# Patient Record
Sex: FEMALE | Race: WHITE | NOT HISPANIC OR LATINO | Employment: UNEMPLOYED | URBAN - METROPOLITAN AREA
[De-identification: names, ages, dates, MRNs, and addresses within clinical notes are randomized per-mention and may not be internally consistent; named-entity substitution may affect disease eponyms.]

---

## 2020-01-17 PROBLEM — R11.2 INTRACTABLE NAUSEA AND VOMITING: Status: ACTIVE | Noted: 2020-01-17

## 2020-01-17 PROBLEM — R10.9 ABDOMINAL PAIN: Status: ACTIVE | Noted: 2020-01-17

## 2020-01-17 PROBLEM — I10 HYPERTENSION: Status: ACTIVE | Noted: 2020-01-17

## 2020-01-18 ENCOUNTER — HOSPITAL ENCOUNTER (INPATIENT)
Facility: HOSPITAL | Age: 65
LOS: 8 days | Discharge: HOME | DRG: 435 | End: 2020-01-26
Attending: INTERNAL MEDICINE | Admitting: INTERNAL MEDICINE
Payer: MEDICARE

## 2020-01-18 DIAGNOSIS — K86.89 PANCREATIC MASS: Primary | ICD-10-CM

## 2020-01-18 PROBLEM — R45.89 DEPRESSED MOOD: Status: ACTIVE | Noted: 2020-01-18

## 2020-01-18 PROBLEM — R74.01 TRANSAMINITIS: Status: ACTIVE | Noted: 2020-01-18

## 2020-01-18 PROBLEM — J18.1 CONSOLIDATION OF RIGHT LOWER LOBE OF LUNG: Status: ACTIVE | Noted: 2020-01-18

## 2020-01-18 PROBLEM — D72.829 LEUKOCYTOSIS: Status: ACTIVE | Noted: 2020-01-18

## 2020-01-18 PROBLEM — R93.5 ABNORMAL CT OF THE ABDOMEN: Status: ACTIVE | Noted: 2020-01-18

## 2020-01-18 PROBLEM — D64.9 NORMOCYTIC ANEMIA: Status: ACTIVE | Noted: 2020-01-18

## 2020-01-18 PROBLEM — J90 PLEURAL EFFUSION: Status: ACTIVE | Noted: 2020-01-18

## 2020-01-18 PROBLEM — E87.6 HYPOKALEMIA: Status: ACTIVE | Noted: 2020-01-18

## 2020-01-18 PROCEDURE — 25000003 PHARM REV CODE 250: Performed by: STUDENT IN AN ORGANIZED HEALTH CARE EDUCATION/TRAINING PROGRAM

## 2020-01-18 PROCEDURE — 63600175 PHARM REV CODE 636 W HCPCS: Performed by: STUDENT IN AN ORGANIZED HEALTH CARE EDUCATION/TRAINING PROGRAM

## 2020-01-18 PROCEDURE — 63600175 PHARM REV CODE 636 W HCPCS: Performed by: HOSPITALIST

## 2020-01-18 PROCEDURE — 92960 CARDIOVERSION ELECTRIC EXT: CPT

## 2020-01-18 PROCEDURE — 99223 1ST HOSP IP/OBS HIGH 75: CPT | Mod: ,,, | Performed by: HOSPITALIST

## 2020-01-18 PROCEDURE — 20600001 HC STEP DOWN PRIVATE ROOM

## 2020-01-18 PROCEDURE — 99223 PR INITIAL HOSPITAL CARE,LEVL III: ICD-10-PCS | Mod: ,,, | Performed by: HOSPITALIST

## 2020-01-18 RX ORDER — SODIUM CHLORIDE 0.9 % (FLUSH) 0.9 %
10 SYRINGE (ML) INJECTION
Status: DISCONTINUED | OUTPATIENT
Start: 2020-01-18 | End: 2020-01-26 | Stop reason: HOSPADM

## 2020-01-18 RX ORDER — PANTOPRAZOLE SODIUM 40 MG/1
40 TABLET, DELAYED RELEASE ORAL DAILY
Status: DISCONTINUED | OUTPATIENT
Start: 2020-01-19 | End: 2020-01-19

## 2020-01-18 RX ORDER — CALCIUM CARBONATE 200(500)MG
500 TABLET,CHEWABLE ORAL ONCE
Status: COMPLETED | OUTPATIENT
Start: 2020-01-18 | End: 2020-01-18

## 2020-01-18 RX ORDER — POLYETHYLENE GLYCOL 3350 17 G/17G
17 POWDER, FOR SOLUTION ORAL DAILY
Status: DISCONTINUED | OUTPATIENT
Start: 2020-01-19 | End: 2020-01-26 | Stop reason: HOSPADM

## 2020-01-18 RX ORDER — ZOLPIDEM TARTRATE 5 MG/1
5 TABLET ORAL NIGHTLY PRN
Status: DISCONTINUED | OUTPATIENT
Start: 2020-01-18 | End: 2020-01-26 | Stop reason: HOSPADM

## 2020-01-18 RX ORDER — PROMETHAZINE HYDROCHLORIDE 12.5 MG/1
25 TABLET ORAL EVERY 6 HOURS PRN
Status: DISCONTINUED | OUTPATIENT
Start: 2020-01-18 | End: 2020-01-19

## 2020-01-18 RX ORDER — ONDANSETRON 8 MG/1
8 TABLET, ORALLY DISINTEGRATING ORAL EVERY 8 HOURS PRN
Status: DISCONTINUED | OUTPATIENT
Start: 2020-01-18 | End: 2020-01-26 | Stop reason: HOSPADM

## 2020-01-18 RX ORDER — HYDROCODONE BITARTRATE AND ACETAMINOPHEN 5; 325 MG/1; MG/1
1 TABLET ORAL EVERY 4 HOURS PRN
Status: DISCONTINUED | OUTPATIENT
Start: 2020-01-18 | End: 2020-01-26 | Stop reason: HOSPADM

## 2020-01-18 RX ORDER — MORPHINE SULFATE 2 MG/ML
4 INJECTION, SOLUTION INTRAMUSCULAR; INTRAVENOUS EVERY 4 HOURS PRN
Status: DISCONTINUED | OUTPATIENT
Start: 2020-01-18 | End: 2020-01-23

## 2020-01-18 RX ORDER — HYDRALAZINE HYDROCHLORIDE 20 MG/ML
10 INJECTION INTRAMUSCULAR; INTRAVENOUS ONCE
Status: COMPLETED | OUTPATIENT
Start: 2020-01-18 | End: 2020-01-18

## 2020-01-18 RX ORDER — LISINOPRIL AND HYDROCHLOROTHIAZIDE 12.5; 2 MG/1; MG/1
1 TABLET ORAL DAILY
Status: DISCONTINUED | OUTPATIENT
Start: 2020-01-19 | End: 2020-01-19

## 2020-01-18 RX ORDER — ACETAMINOPHEN 325 MG/1
650 TABLET ORAL EVERY 4 HOURS PRN
Status: DISCONTINUED | OUTPATIENT
Start: 2020-01-18 | End: 2020-01-26 | Stop reason: HOSPADM

## 2020-01-18 RX ADMIN — CALCIUM CARBONATE (ANTACID) CHEW TAB 500 MG 500 MG: 500 CHEW TAB at 06:01

## 2020-01-18 RX ADMIN — MORPHINE SULFATE 4 MG: 2 INJECTION, SOLUTION INTRAMUSCULAR; INTRAVENOUS at 09:01

## 2020-01-18 RX ADMIN — HYDRALAZINE HYDROCHLORIDE 10 MG: 20 INJECTION INTRAMUSCULAR; INTRAVENOUS at 06:01

## 2020-01-18 RX ADMIN — ONDANSETRON 8 MG: 8 TABLET, ORALLY DISINTEGRATING ORAL at 06:01

## 2020-01-18 RX ADMIN — PIPERACILLIN SODIUM,TAZOBACTAM SODIUM 4.5 G: 4; .5 INJECTION, POWDER, FOR SOLUTION INTRAVENOUS at 09:01

## 2020-01-18 RX ADMIN — PROMETHAZINE HYDROCHLORIDE 12.5 MG: 25 INJECTION INTRAMUSCULAR; INTRAVENOUS at 10:01

## 2020-01-18 RX ADMIN — MORPHINE SULFATE 4 MG: 2 INJECTION, SOLUTION INTRAMUSCULAR; INTRAVENOUS at 05:01

## 2020-01-18 RX ADMIN — VANCOMYCIN HYDROCHLORIDE 1500 MG: 1.5 INJECTION, POWDER, LYOPHILIZED, FOR SOLUTION INTRAVENOUS at 10:01

## 2020-01-18 NOTE — NURSING
Pt received by ambulance into room 1055. C/o abd and back pain. Will continue to monitor. Education provided on poc.

## 2020-01-18 NOTE — PLAN OF CARE
"[facility] admissions ONLY: Please call extension [phone] upon patient arrival to floor for Hospital Medicine admit team assignment and for additional admit orders for the patient. Do not page the attending, staff physician or Advanced Practice Provider with the patient on arrival (may not be in-house at the time of arrival). Call back or wait to leave beeper number when prompted.     Outside Transfer Acceptance Note        Patients name/MRN:  Radha Alcazar, MRN: [id number]     Referring Physician or Mid-Level provider giving report:  Courtney Díaz MD  [phone]     Referral Facility:  [facility], inpatient, med-surg tele, original admit date of 1/17/20     Date/Time of Acceptance: 1/18/20 at 1:30pm     Accepting Physician for admission to hospital: IRENE Delgado MD ()     [facility]     Consulting Physicians from [facility] involved in case:   None    Reason for transfer request:   Ms. Alcazar is a 65yo lady recently admitted at [facility] on 1/17/20.  They noted on HPI that she, presented to the ED with complaints of nausea and vomiting, inability to tolerate PO and generalized weakness. Of not she was recently [place] ([facility]) for persistent vomiting and R shoulder pain, discharged 1 day prior to presentation. Pt stated she had decreased appetite since thanksgiving due to the feeling of "food getting stuck in chest" as well as epigastric pain that radiates to her R shoulder. Symptoms have progressively worsened with decreased ability to tolerate PO. Pt reported a 40lbs unintentional weight loss since Nov 2019. She endorses minimal intake for the last 2 weeks, constipation, acid reflux and N/V.      Of note, the patient states she had a CT scan performed at [facility] and was told she had gallbladder stones and a mass on pancreas and was referred to cancer center. After discharge yesterday, patient continued to vomit " "at home and was unable to eat/drink or keep down her medications. She denied any recent illness, sick contacts, night sweats, f/c, urinary changes, hemoptysis or hematuria. She denied melena or BRBPR.    CT abdomen on admission (1/17) revealed multiple abnormalities: Distended stomach, 1st, 2nd and 3rd portions of the duodenum suggestive of an obstructive process.  Ascites in the abdomen and pelvis and abnormal low-density foci along the capsule of the right lobe of the liver laterally and inferiorly suspicious for carcinomatosis. Abnormal diminished attenuation in the region of the body and tail of the pancreas with fluid adjacent to these areas.  Findings could represent pancreatic neoplasm and/or pancreatitis. Bilateral pleural effusions with airspace consolidation in the lower lobes bilaterally as well as in the right middle lobe.    She was begun on empiric Vancomycin and Zosyn for the airspace consolidations.     To Do List upon arrival:    Consult Heme-Onc and AES  Will need EUS and biopsy     VS: /82 (Patient Position: Lying)   Pulse 84   Temp 96.7 °F (35.9 °C) (Oral)   Resp 20   Ht 5' 11" (1.803 m)   Wt 80 kg (176 lb 5.9 oz)   SpO2 97%   Breastfeeding? No   BMI 24.60 kg/m²     Past Medical History:   Diagnosis Date    Hypertension      Current Facility-Administered Medications:     acetaminophen tablet 650 mg, 650 mg, Oral, Q4H PRN, Courtney Díaz MD    dextrose 50% injection 12.5 g, 12.5 g, Intravenous, PRN, Courtney Díaz MD    dextrose 50% injection 25 g, 25 g, Intravenous, PRN, Courtney Díaz MD    enoxaparin injection 40 mg, 40 mg, Subcutaneous, Daily     glucose chewable tablet 16 g, 16 g, Oral, PRN, Courtney Díaz MD    glucose chewable tablet 24 g, 24 g, Oral, PRN, Courtney Díaz MD    hydroCHLOROthiazide tablet 12.5 mg, 12.5 mg, Oral, Daily     HYDROcodone-acetaminophen  mg per tablet 1 tablet, 1 tablet, Oral, Q6H PRN     " HYDROcodone-acetaminophen 5-325 mg per tablet 1 tablet, 1 tablet, Oral, Q6H PRN     lisinopril tablet 20 mg, 20 mg, Oral, QHS, Courtney Díaz MD, 20 mg at 01/17/20 2326    ondansetron injection 4 mg, 4 mg, Intravenous, Q8H PRN, Courtney Díaz MD    piperacillin-tazobactam 4.5 g in dextrose 5 % 100 mL IVPB   4.5 g, Intravenous, Q8H     potassium, sodium phosphates 280-160-250 mg packet 1 packet, 1 packet, Oral, Q4H While awake    promethazine (PHENERGAN) 6.25 mg in dextrose 5 % 50 mL IVPB, 6.25 mg, Intravenous, Q6H     sodium chloride 0.9% flush 10 mL, 10 mL, Intravenous, PRN, Courtney Díaz MD    Pharmacy to dose Vancomycin consult, , , Once **AND** vancomycin - pharmacy to dose     vancomycin 1.5 g in dextrose 5 % 250 mL IVPB (ready to mix), 1,500 mg, Intravenous, Q12H        LABS:   Recent Results (from the past 24 hour(s))   CBC W/ AUTO DIFFERENTIAL    Collection Time: 01/17/20  5:00 PM   Result Value Ref Range    WBC 19.67 (H) 3.90 - 12.70 K/uL    RBC 4.42 4.00 - 5.40 M/uL    Hemoglobin 13.7 12.0 - 16.0 g/dL    Hematocrit 41.0 37.0 - 48.5 %    Mean Corpuscular Volume 93 82 - 98 fL    Mean Corpuscular Hemoglobin 31.0 27.0 - 31.0 pg    Mean Corpuscular Hemoglobin Conc 33.4 32.0 - 36.0 g/dL    RDW 13.2 11.5 - 14.5 %    Platelets 379 (H) 150 - 350 K/uL    MPV 10.5 9.2 - 12.9 fL    Immature Granulocytes 1.0 (H) 0.0 - 0.5 %    Gran # (ANC) 16.2 (H) 1.8 - 7.7 K/uL    Immature Grans (Abs) 0.20 (H) 0.00 - 0.04 K/uL    Lymph # 1.7 1.0 - 4.8 K/uL    Mono # 1.5 (H) 0.3 - 1.0 K/uL    Eos # 0.0 0.0 - 0.5 K/uL    Baso # 0.05 0.00 - 0.20 K/uL    nRBC 0 0 /100 WBC    Gran% 82.3 (H) 38.0 - 73.0 %    Lymph% 8.5 (L) 18.0 - 48.0 %    Mono% 7.7 4.0 - 15.0 %    Eosinophil% 0.2 0.0 - 8.0 %    Basophil% 0.3 0.0 - 1.9 %    Differential Method Automated    Comp. Metabolic Panel    Collection Time: 01/17/20  5:00 PM   Result Value Ref Range    Sodium 136 136 - 145 mmol/L    Potassium 3.6 3.5 - 5.1 mmol/L    Chloride  94 (L) 95 - 110 mmol/L    CO2 25 23 - 29 mmol/L    Glucose 96 70 - 110 mg/dL    BUN, Bld 9 8 - 23 mg/dL    Creatinine 0.6 0.5 - 1.4 mg/dL    Calcium 8.8 8.7 - 10.5 mg/dL    Total Protein 7.2 6.0 - 8.4 g/dL    Albumin 2.6 (L) 3.5 - 5.2 g/dL    Total Bilirubin 1.1 (H) 0.1 - 1.0 mg/dL    Alkaline Phosphatase 186 (H) 55 - 135 U/L    AST 74 (H) 10 - 40 U/L    ALT 98 (H) 10 - 44 U/L    Anion Gap 17 (H) 8 - 16 mmol/L    eGFR if African American >60.0 >60 mL/min/1.73 m^2    eGFR if non African American >60.0 >60 mL/min/1.73 m^2   Lipase    Collection Time: 01/17/20  5:00 PM   Result Value Ref Range    Lipase 7 4 - 60 U/L   Blood Culture #1 **CANNOT BE ORDERED STAT**    Collection Time: 01/17/20  6:50 PM   Result Value Ref Range    Blood Culture, Routine No Growth to date    Blood Culture #2 **CANNOT BE ORDERED STAT**    Collection Time: 01/17/20  6:55 PM   Result Value Ref Range    Blood Culture, Routine No Growth to date    Respiratory Infection Panel, Nasopharyngeal    Collection Time: 01/17/20  9:31 PM   Result Value Ref Range    Respiratory Infection Panel Source NP Swab Not Detected    Adenovirus Not Detected Not Detected    Coronavirus 229E Not Detected Not Detected    Coronavirus HKU1 Not Detected Not Detected    Coronavirus NL63 Not Detected Not Detected    Coronavirus OC43 Not Detected Not Detected    Human Metapneumovirus Not Detected Not Detected    Human Rhinovirus/Enterovirus Not Detected Not Detected    Influenza A (subtypes H1, H1-2009,H3) Not Detected Not Detected    Influenza B Not Detected Not Detected    Parainfluenza Virus 1 Not Detected Not Detected    Parainfluenza Virus 2 Not Detected Not Detected    Parainfluenza Virus 3 Not Detected Not Detected    Parainfluenza Virus 4 Not Detected Not Detected    Respiratory Syncytial Virus Not Detected Not Detected    Bordetella Parapertussis (BI3960) Not Detected Not Detected    Bordetella pertussis (ptxP) Not Detected Not Detected    Chlamydia pneumoniae Not  Detected Not Detected    Mycoplasma pneumoniae Not Detected Not Detected   Urinalysis, Reflex to Urine Culture Urine, Clean Catch    Collection Time: 01/17/20 11:30 PM   Result Value Ref Range    Specimen UA Urine, Clean Catch     Color, UA Yellow Yellow, Straw, Jacquelin    Appearance, UA Clear Clear    pH, UA 6.0 5.0 - 8.0    Specific Gravity, UA >1.030 (A) 1.005 - 1.030    Protein, UA Negative Negative    Glucose, UA Negative Negative    Ketones, UA 1+ (A) Negative    Bilirubin (UA) Negative Negative    Occult Blood UA Negative Negative    Nitrite, UA Negative Negative    Leukocytes, UA Negative Negative   CBC with Automated Differential    Collection Time: 01/18/20  5:25 AM   Result Value Ref Range    WBC 15.34 (H) 3.90 - 12.70 K/uL    RBC 3.76 (L) 4.00 - 5.40 M/uL    Hemoglobin 11.6 (L) 12.0 - 16.0 g/dL    Hematocrit 34.3 (L) 37.0 - 48.5 %    Mean Corpuscular Volume 91 82 - 98 fL    Mean Corpuscular Hemoglobin 30.9 27.0 - 31.0 pg    Mean Corpuscular Hemoglobin Conc 33.8 32.0 - 36.0 g/dL    RDW 13.2 11.5 - 14.5 %    Platelets 390 (H) 150 - 350 K/uL    MPV 10.1 9.2 - 12.9 fL    Immature Granulocytes 1.0 (H) 0.0 - 0.5 %    Gran # (ANC) 12.5 (H) 1.8 - 7.7 K/uL    Immature Grans (Abs) 0.15 (H) 0.00 - 0.04 K/uL    Lymph # 1.2 1.0 - 4.8 K/uL    Mono # 1.4 (H) 0.3 - 1.0 K/uL    Eos # 0.1 0.0 - 0.5 K/uL    Baso # 0.04 0.00 - 0.20 K/uL    nRBC 0 0 /100 WBC    Gran% 81.1 (H) 38.0 - 73.0 %    Lymph% 7.8 (L) 18.0 - 48.0 %    Mono% 9.3 4.0 - 15.0 %    Eosinophil% 0.5 0.0 - 8.0 %    Basophil% 0.3 0.0 - 1.9 %    Differential Method Automated    PT/INR    Collection Time: 01/18/20  5:25 AM   Result Value Ref Range    Prothrombin Time 16.8 (H) 9.0 - 12.5 sec    INR 1.5 (H) 0.8 - 1.2   AFP tumor marker    Collection Time: 01/18/20  5:25 AM   Result Value Ref Range    AFP 5.8 0.0 - 8.4 ng/mL   Comprehensive Metabolic Panel (CMP)    Collection Time: 01/18/20  5:26 AM   Result Value Ref Range    Sodium 134 (L) 136 - 145 mmol/L     Potassium 3.1 (L) 3.5 - 5.1 mmol/L    Chloride 96 95 - 110 mmol/L    CO2 26 23 - 29 mmol/L    Glucose 106 70 - 110 mg/dL    BUN, Bld 11 8 - 23 mg/dL    Creatinine 0.6 0.5 - 1.4 mg/dL    Calcium 7.9 (L) 8.7 - 10.5 mg/dL    Total Protein 5.9 (L) 6.0 - 8.4 g/dL    Albumin 2.2 (L) 3.5 - 5.2 g/dL    Total Bilirubin 0.9 0.1 - 1.0 mg/dL    Alkaline Phosphatase 170 (H) 55 - 135 U/L    AST 75 (H) 10 - 40 U/L    ALT 90 (H) 10 - 44 U/L    Anion Gap 12 8 - 16 mmol/L    eGFR if African American >60.0 >60 mL/min/1.73 m^2    eGFR if non African American >60.0 >60 mL/min/1.73 m^2   Magnesium    Collection Time: 01/18/20  5:26 AM   Result Value Ref Range    Magnesium 1.7 1.6 - 2.6 mg/dL   Phosphorus    Collection Time: 01/18/20  5:26 AM   Result Value Ref Range    Phosphorus 2.3 (L) 2.7 - 4.5 mg/dL       Imaging:   - CXR with bilateral pleural effusions, R > L and airspace consolidation is noted in the R mid and lower lung zones.    - Abdominal U/S with distended fluid-filled stomach in the LUQ, a R pleural effusion, heterogeneous echogenicity in portions of the inferior aspect of the liver    - CT with distended stomach, 1st, 2nd and 3rd portions of the duodenum suggestive of an obstructive process, bilateral pleural effusions with airspace consolidation in the lower lobes bilaterally as well as in the R middle lobe. Pancreatic lesion suspicious for pancreatic neoplasm and/or pancreatitis.    IRENE Delgado MD  Department of Hospital Medicine  [facility]/   [phone]

## 2020-01-19 LAB
ALBUMIN SERPL BCP-MCNC: 2.3 G/DL (ref 3.5–5.2)
ALP SERPL-CCNC: 164 U/L (ref 55–135)
ALT SERPL W/O P-5'-P-CCNC: 90 U/L (ref 10–44)
ANION GAP SERPL CALC-SCNC: 11 MMOL/L (ref 8–16)
ANION GAP SERPL CALC-SCNC: 13 MMOL/L (ref 8–16)
ANION GAP SERPL CALC-SCNC: 14 MMOL/L (ref 8–16)
AST SERPL-CCNC: 79 U/L (ref 10–40)
BASOPHILS # BLD AUTO: 0.06 K/UL (ref 0–0.2)
BASOPHILS NFR BLD: 0.4 % (ref 0–1.9)
BILIRUB SERPL-MCNC: 0.7 MG/DL (ref 0.1–1)
BUN SERPL-MCNC: 12 MG/DL (ref 8–23)
BUN SERPL-MCNC: 12 MG/DL (ref 8–23)
BUN SERPL-MCNC: 13 MG/DL (ref 8–23)
CALCIUM SERPL-MCNC: 8.3 MG/DL (ref 8.7–10.5)
CALCIUM SERPL-MCNC: 8.4 MG/DL (ref 8.7–10.5)
CALCIUM SERPL-MCNC: 8.6 MG/DL (ref 8.7–10.5)
CHLORIDE SERPL-SCNC: 95 MMOL/L (ref 95–110)
CHLORIDE SERPL-SCNC: 95 MMOL/L (ref 95–110)
CHLORIDE SERPL-SCNC: 98 MMOL/L (ref 95–110)
CO2 SERPL-SCNC: 26 MMOL/L (ref 23–29)
CO2 SERPL-SCNC: 26 MMOL/L (ref 23–29)
CO2 SERPL-SCNC: 31 MMOL/L (ref 23–29)
CREAT SERPL-MCNC: 1 MG/DL (ref 0.5–1.4)
CREAT SERPL-MCNC: 1.1 MG/DL (ref 0.5–1.4)
CREAT SERPL-MCNC: 1.2 MG/DL (ref 0.5–1.4)
DIFFERENTIAL METHOD: ABNORMAL
EOSINOPHIL # BLD AUTO: 0.1 K/UL (ref 0–0.5)
EOSINOPHIL NFR BLD: 0.4 % (ref 0–8)
ERYTHROCYTE [DISTWIDTH] IN BLOOD BY AUTOMATED COUNT: 13.4 % (ref 11.5–14.5)
EST. GFR  (AFRICAN AMERICAN): 55.2 ML/MIN/1.73 M^2
EST. GFR  (AFRICAN AMERICAN): >60 ML/MIN/1.73 M^2
EST. GFR  (AFRICAN AMERICAN): >60 ML/MIN/1.73 M^2
EST. GFR  (NON AFRICAN AMERICAN): 47.9 ML/MIN/1.73 M^2
EST. GFR  (NON AFRICAN AMERICAN): 53.2 ML/MIN/1.73 M^2
EST. GFR  (NON AFRICAN AMERICAN): 59.7 ML/MIN/1.73 M^2
FERRITIN SERPL-MCNC: 398 NG/ML (ref 20–300)
GLUCOSE SERPL-MCNC: 108 MG/DL (ref 70–110)
GLUCOSE SERPL-MCNC: 108 MG/DL (ref 70–110)
GLUCOSE SERPL-MCNC: 89 MG/DL (ref 70–110)
HCT VFR BLD AUTO: 37.4 % (ref 37–48.5)
HGB BLD-MCNC: 12.1 G/DL (ref 12–16)
IMM GRANULOCYTES # BLD AUTO: 0.13 K/UL (ref 0–0.04)
IMM GRANULOCYTES NFR BLD AUTO: 0.9 % (ref 0–0.5)
IRON SERPL-MCNC: 43 UG/DL (ref 30–160)
LYMPHOCYTES # BLD AUTO: 1.5 K/UL (ref 1–4.8)
LYMPHOCYTES NFR BLD: 10.5 % (ref 18–48)
MAGNESIUM SERPL-MCNC: 1.8 MG/DL (ref 1.6–2.6)
MCH RBC QN AUTO: 30.3 PG (ref 27–31)
MCHC RBC AUTO-ENTMCNC: 32.4 G/DL (ref 32–36)
MCV RBC AUTO: 94 FL (ref 82–98)
MONOCYTES # BLD AUTO: 1.5 K/UL (ref 0.3–1)
MONOCYTES NFR BLD: 10.8 % (ref 4–15)
NEUTROPHILS # BLD AUTO: 10.8 K/UL (ref 1.8–7.7)
NEUTROPHILS NFR BLD: 77 % (ref 38–73)
NRBC BLD-RTO: 0 /100 WBC
PHOSPHATE SERPL-MCNC: 2.9 MG/DL (ref 2.7–4.5)
PLATELET # BLD AUTO: 434 K/UL (ref 150–350)
PMV BLD AUTO: 10 FL (ref 9.2–12.9)
POTASSIUM SERPL-SCNC: 2.9 MMOL/L (ref 3.5–5.1)
POTASSIUM SERPL-SCNC: 3.2 MMOL/L (ref 3.5–5.1)
POTASSIUM SERPL-SCNC: 4 MMOL/L (ref 3.5–5.1)
PROT SERPL-MCNC: 5.9 G/DL (ref 6–8.4)
RBC # BLD AUTO: 4 M/UL (ref 4–5.4)
SATURATED IRON: 20 % (ref 20–50)
SODIUM SERPL-SCNC: 135 MMOL/L (ref 136–145)
SODIUM SERPL-SCNC: 137 MMOL/L (ref 136–145)
SODIUM SERPL-SCNC: 137 MMOL/L (ref 136–145)
TOTAL IRON BINDING CAPACITY: 219 UG/DL (ref 250–450)
TRANSFERRIN SERPL-MCNC: 148 MG/DL (ref 200–375)
VANCOMYCIN TROUGH SERPL-MCNC: 25.8 UG/ML (ref 10–22)
WBC # BLD AUTO: 14.03 K/UL (ref 3.9–12.7)

## 2020-01-19 PROCEDURE — 83735 ASSAY OF MAGNESIUM: CPT

## 2020-01-19 PROCEDURE — 82728 ASSAY OF FERRITIN: CPT

## 2020-01-19 PROCEDURE — 99233 SBSQ HOSP IP/OBS HIGH 50: CPT | Mod: ,,, | Performed by: INTERNAL MEDICINE

## 2020-01-19 PROCEDURE — 80048 BASIC METABOLIC PNL TOTAL CA: CPT | Mod: 91

## 2020-01-19 PROCEDURE — 99233 SBSQ HOSP IP/OBS HIGH 50: CPT | Mod: ,,, | Performed by: HOSPITALIST

## 2020-01-19 PROCEDURE — 99232 PR SUBSEQUENT HOSPITAL CARE,LEVL II: ICD-10-PCS | Mod: ,,, | Performed by: INTERNAL MEDICINE

## 2020-01-19 PROCEDURE — 85025 COMPLETE CBC W/AUTO DIFF WBC: CPT

## 2020-01-19 PROCEDURE — 20600001 HC STEP DOWN PRIVATE ROOM

## 2020-01-19 PROCEDURE — 63600175 PHARM REV CODE 636 W HCPCS: Performed by: STUDENT IN AN ORGANIZED HEALTH CARE EDUCATION/TRAINING PROGRAM

## 2020-01-19 PROCEDURE — 63600175 PHARM REV CODE 636 W HCPCS: Performed by: HOSPITALIST

## 2020-01-19 PROCEDURE — 94761 N-INVAS EAR/PLS OXIMETRY MLT: CPT

## 2020-01-19 PROCEDURE — 80053 COMPREHEN METABOLIC PANEL: CPT

## 2020-01-19 PROCEDURE — 99233 PR SUBSEQUENT HOSPITAL CARE,LEVL III: ICD-10-PCS | Mod: ,,, | Performed by: INTERNAL MEDICINE

## 2020-01-19 PROCEDURE — 83540 ASSAY OF IRON: CPT

## 2020-01-19 PROCEDURE — 99233 PR SUBSEQUENT HOSPITAL CARE,LEVL III: ICD-10-PCS | Mod: ,,, | Performed by: HOSPITALIST

## 2020-01-19 PROCEDURE — C9113 INJ PANTOPRAZOLE SODIUM, VIA: HCPCS | Performed by: STUDENT IN AN ORGANIZED HEALTH CARE EDUCATION/TRAINING PROGRAM

## 2020-01-19 PROCEDURE — 80202 ASSAY OF VANCOMYCIN: CPT

## 2020-01-19 PROCEDURE — 25000003 PHARM REV CODE 250: Performed by: STUDENT IN AN ORGANIZED HEALTH CARE EDUCATION/TRAINING PROGRAM

## 2020-01-19 PROCEDURE — 84100 ASSAY OF PHOSPHORUS: CPT

## 2020-01-19 PROCEDURE — 80048 BASIC METABOLIC PNL TOTAL CA: CPT

## 2020-01-19 PROCEDURE — 99232 SBSQ HOSP IP/OBS MODERATE 35: CPT | Mod: ,,, | Performed by: INTERNAL MEDICINE

## 2020-01-19 PROCEDURE — 36415 COLL VENOUS BLD VENIPUNCTURE: CPT

## 2020-01-19 RX ORDER — PANTOPRAZOLE SODIUM 40 MG/10ML
40 INJECTION, POWDER, LYOPHILIZED, FOR SOLUTION INTRAVENOUS EVERY 24 HOURS
Status: DISCONTINUED | OUTPATIENT
Start: 2020-01-19 | End: 2020-01-22

## 2020-01-19 RX ORDER — POTASSIUM CHLORIDE 750 MG/1
30 CAPSULE, EXTENDED RELEASE ORAL
Status: DISCONTINUED | OUTPATIENT
Start: 2020-01-19 | End: 2020-01-19

## 2020-01-19 RX ORDER — POTASSIUM CHLORIDE 7.45 MG/ML
10 INJECTION INTRAVENOUS
Status: DISPENSED | OUTPATIENT
Start: 2020-01-19 | End: 2020-01-19

## 2020-01-19 RX ORDER — LISINOPRIL 20 MG/1
20 TABLET ORAL DAILY
Status: DISCONTINUED | OUTPATIENT
Start: 2020-01-19 | End: 2020-01-22

## 2020-01-19 RX ORDER — POTASSIUM CHLORIDE 7.45 MG/ML
10 INJECTION INTRAVENOUS
Status: DISCONTINUED | OUTPATIENT
Start: 2020-01-19 | End: 2020-01-19

## 2020-01-19 RX ORDER — POTASSIUM CHLORIDE 7.45 MG/ML
10 INJECTION INTRAVENOUS ONCE
Status: COMPLETED | OUTPATIENT
Start: 2020-01-19 | End: 2020-01-19

## 2020-01-19 RX ORDER — BISACODYL 10 MG
10 SUPPOSITORY, RECTAL RECTAL ONCE
Status: COMPLETED | OUTPATIENT
Start: 2020-01-19 | End: 2020-01-19

## 2020-01-19 RX ORDER — BISACODYL 10 MG
10 SUPPOSITORY, RECTAL RECTAL DAILY PRN
Status: DISCONTINUED | OUTPATIENT
Start: 2020-01-20 | End: 2020-01-26 | Stop reason: HOSPADM

## 2020-01-19 RX ORDER — SODIUM CHLORIDE, SODIUM LACTATE, POTASSIUM CHLORIDE, CALCIUM CHLORIDE 600; 310; 30; 20 MG/100ML; MG/100ML; MG/100ML; MG/100ML
INJECTION, SOLUTION INTRAVENOUS CONTINUOUS
Status: DISCONTINUED | OUTPATIENT
Start: 2020-01-19 | End: 2020-01-24

## 2020-01-19 RX ORDER — HYDROCHLOROTHIAZIDE 12.5 MG/1
12.5 TABLET ORAL DAILY
Status: DISCONTINUED | OUTPATIENT
Start: 2020-01-19 | End: 2020-01-22

## 2020-01-19 RX ADMIN — POTASSIUM CHLORIDE 10 MEQ: 7.46 INJECTION, SOLUTION INTRAVENOUS at 04:01

## 2020-01-19 RX ADMIN — BISACODYL 10 MG: 10 SUPPOSITORY RECTAL at 12:01

## 2020-01-19 RX ADMIN — MORPHINE SULFATE 4 MG: 2 INJECTION, SOLUTION INTRAMUSCULAR; INTRAVENOUS at 12:01

## 2020-01-19 RX ADMIN — PIPERACILLIN SODIUM,TAZOBACTAM SODIUM 4.5 G: 4; .5 INJECTION, POWDER, FOR SOLUTION INTRAVENOUS at 09:01

## 2020-01-19 RX ADMIN — MORPHINE SULFATE 4 MG: 2 INJECTION, SOLUTION INTRAMUSCULAR; INTRAVENOUS at 07:01

## 2020-01-19 RX ADMIN — POTASSIUM CHLORIDE 10 MEQ: 7.46 INJECTION, SOLUTION INTRAVENOUS at 06:01

## 2020-01-19 RX ADMIN — POTASSIUM CHLORIDE 10 MEQ: 7.46 INJECTION, SOLUTION INTRAVENOUS at 09:01

## 2020-01-19 RX ADMIN — PIPERACILLIN SODIUM,TAZOBACTAM SODIUM 4.5 G: 4; .5 INJECTION, POWDER, FOR SOLUTION INTRAVENOUS at 02:01

## 2020-01-19 RX ADMIN — SODIUM CHLORIDE, SODIUM LACTATE, POTASSIUM CHLORIDE, AND CALCIUM CHLORIDE: 600; 310; 30; 20 INJECTION, SOLUTION INTRAVENOUS at 12:01

## 2020-01-19 RX ADMIN — POTASSIUM CHLORIDE 10 MEQ: 7.46 INJECTION, SOLUTION INTRAVENOUS at 05:01

## 2020-01-19 RX ADMIN — PROMETHAZINE HYDROCHLORIDE 12.5 MG: 25 INJECTION INTRAMUSCULAR; INTRAVENOUS at 12:01

## 2020-01-19 RX ADMIN — PIPERACILLIN SODIUM,TAZOBACTAM SODIUM 4.5 G: 4; .5 INJECTION, POWDER, FOR SOLUTION INTRAVENOUS at 04:01

## 2020-01-19 RX ADMIN — MORPHINE SULFATE 4 MG: 2 INJECTION, SOLUTION INTRAMUSCULAR; INTRAVENOUS at 04:01

## 2020-01-19 RX ADMIN — POTASSIUM CHLORIDE 10 MEQ: 7.46 INJECTION, SOLUTION INTRAVENOUS at 02:01

## 2020-01-19 RX ADMIN — PANTOPRAZOLE SODIUM 40 MG: 40 INJECTION, POWDER, FOR SOLUTION INTRAVENOUS at 12:01

## 2020-01-19 RX ADMIN — POTASSIUM CHLORIDE 10 MEQ: 7.46 INJECTION, SOLUTION INTRAVENOUS at 12:01

## 2020-01-19 NOTE — ASSESSMENT & PLAN NOTE
Certainly concerning for stage IV pancreatic cancer, though will require a tissue diagnosis. Discussed this with the patient and will defer more in depth conversation pending diagnosis. She lives in [place], [place] and follow up probably more appropriate closer to home. Good PS though. Not likely to be a surgical candidate with CT findings.   -would proceed with biopsy of the mass  -will await results of path  -if confirmed panc ca, will require BRCA testing, PD-L1, MSI and MMR testing  -she would like follow up to be scheduled closer to home - if cancer  -would get CT chest to complete staging as an inpatient

## 2020-01-19 NOTE — ASSESSMENT & PLAN NOTE
"Ms. Alcazar is a 63 yo F with PMHx of HTN and appendectomy who transferred from [facility] for further evaluation of pancreatic mass be hem/Onc and possible biopsy by EUS.    Pt stated she had decreased appetite since thanksgiving due to the feeling of "food getting stuck in chest" as well as epigastric pain that radiates to her R shoulder. Symptoms have progressively worsened with decreased ability to tolerate PO. Pt reported a 40lbs unintentional weight loss since Nov 2019. She endorses minimal intake for the last 2 wks, constipation, acid reflux and N/V. Denied chills or night sweat. Denied abdominal pain, or diarrhea.     CT abdomen with multiple abnormalities: Distended stomach, 1st, 2nd and 3rd portions of the duodenum suggestive of an obstructive process.  Ascites in the abdomen and pelvis and abnormal low-density foci along the capsule of the right lobe of the liver laterally and inferiorly suspicious for carcinomatosis. Abnormal diminished attenuation in the region of the body and tail of the pancreas with fluid adjacent to these areas.  Findings could represent pancreatic neoplasm and/or pancreatitis. Bilateral pleural effusions with airspace consolidation in the lower lobes bilaterally as well as in the right middle lobe.    Giving the presentation and CT finding, concerning for cancer process       Plan:   - Oncology consulted, appreciate recs   - AES consulted for possible biopsy, patient NPO by midnight       "

## 2020-01-19 NOTE — SUBJECTIVE & OBJECTIVE
Oncology Treatment Plan:   [No treatment plan]    Medications:  Continuous Infusions:   lactated ringers 75 mL/hr at 01/19/20 1215     Scheduled Meds:   lisinopril  20 mg Oral Daily    And    hydroCHLOROthiazide  12.5 mg Oral Daily    pantoprozole (PROTONIX) IV  40 mg Intravenous Daily    piperacillin-tazobactam (ZOSYN) IVPB  4.5 g Intravenous Q8H    polyethylene glycol  17 g Oral Daily    potassium chloride in water  10 mEq Intravenous Once    potassium chloride in water  10 mEq Intravenous Q1H    vancomycin (VANCOCIN) IVPB  1,500 mg Intravenous Q24H     PRN Meds:acetaminophen, [START ON 1/20/2020] bisacodyl, HYDROcodone-acetaminophen, morphine, ondansetron, promethazine (PHENERGAN) IVPB, sodium chloride 0.9%, zolpidem     Review of patient's allergies indicates:  No Known Allergies     Past Medical History:   Diagnosis Date    Hypertension      Past Surgical History:   Procedure Laterality Date    APPENDECTOMY       Family History     Problem Relation (Age of Onset)    Cancer Mother, Brother        Tobacco Use    Smoking status: Never Smoker    Smokeless tobacco: Never Used   Substance and Sexual Activity    Alcohol use: Never     Frequency: Never    Drug use: Never    Sexual activity: Not on file       Review of Systems   Constitutional: Positive for activity change, appetite change, fatigue and unexpected weight change.   HENT: Positive for trouble swallowing. Negative for dental problem and mouth sores.    Eyes: Negative for photophobia, redness and visual disturbance.   Respiratory: Negative for shortness of breath.    Cardiovascular: Negative for chest pain, palpitations and leg swelling.   Gastrointestinal: Positive for abdominal distention, abdominal pain, constipation, nausea and vomiting. Negative for anal bleeding and diarrhea.   Endocrine: Negative for polydipsia, polyphagia and polyuria.   Genitourinary: Negative for decreased urine volume, dysuria and urgency.   Musculoskeletal:  Negative for arthralgias, back pain and myalgias.   Allergic/Immunologic: Negative for immunocompromised state.   Neurological: Positive for weakness. Negative for dizziness and headaches.   Hematological: Negative for adenopathy. Does not bruise/bleed easily.   Psychiatric/Behavioral: Negative for agitation, behavioral problems and confusion.     Objective:     Vital Signs (Most Recent):  Temp: 97.7 °F (36.5 °C) (01/19/20 1203)  Pulse: 79 (01/19/20 1203)  Resp: 16 (01/19/20 1203)  BP: (!) 144/68 (01/19/20 1203)  SpO2: 97 % (01/19/20 1203) Vital Signs (24h Range):  Temp:  [97.6 °F (36.4 °C)-99.8 °F (37.7 °C)] 97.7 °F (36.5 °C)  Pulse:  [79-95] 79  Resp:  [16-20] 16  SpO2:  [94 %-97 %] 97 %  BP: (143-193)/(67-91) 144/68     Weight: 77.6 kg (171 lb)  Body mass index is 23.85 kg/m².  Body surface area is 1.97 meters squared.      Intake/Output Summary (Last 24 hours) at 1/19/2020 1258  Last data filed at 1/19/2020 0937  Gross per 24 hour   Intake 100 ml   Output --   Net 100 ml       Physical Exam   Constitutional: She is oriented to person, place, and time.   Middle aged female, laying in bed, appears tired   HENT:   Head: Normocephalic and atraumatic.   Eyes: Pupils are equal, round, and reactive to light. EOM are normal.   Neck: Normal range of motion. Neck supple.   Cardiovascular: Normal rate and regular rhythm.   Pulmonary/Chest: Effort normal and breath sounds normal.   Abdominal: Soft. Bowel sounds are normal. She exhibits no distension. There is tenderness.   Musculoskeletal: Normal range of motion. She exhibits no edema.   Lymphadenopathy:     She has no cervical adenopathy.   Neurological: She is alert and oriented to person, place, and time.   Skin: Skin is warm. No pallor.   Psychiatric: She has a normal mood and affect. Her behavior is normal.   Vitals reviewed.    Significant Labs:   CBC:   Recent Labs   Lab 01/17/20  1700 01/18/20  0525 01/19/20  0408   WBC 19.67* 15.34* 14.03*   HGB 13.7 11.6* 12.1    HCT 41.0 34.3* 37.4   * 390* 434*    and CMP:   Recent Labs   Lab 01/17/20  1700 01/18/20  0526 01/19/20  0408    134* 135*   K 3.6 3.1* 2.9*   CL 94* 96 95   CO2 25 26 26   GLU 96 106 108   BUN 9 11 12   CREATININE 0.6 0.6 1.0   CALCIUM 8.8 7.9* 8.3*   PROT 7.2 5.9* 5.9*   ALBUMIN 2.6* 2.2* 2.3*   BILITOT 1.1* 0.9 0.7   ALKPHOS 186* 170* 164*   AST 74* 75* 79*   ALT 98* 90* 90*   ANIONGAP 17* 12 14   EGFRNONAA >60.0 >60.0 59.7*       Diagnostic Results:  I have reviewed all pertinent imaging results/findings within the past 24 hours.

## 2020-01-19 NOTE — HPI
"Ms. Alcazar is a 63 yo F with PMHx of HTN and appendectomy who transferred from [facility] for further evaluation of pancreatic mass be hem/Onc and possible biopsy by EUS.     Initially patient hospitalized in [facility] ([facility]) for persistent vomiting and R shoulder pain. CT scan performed at [facility] and was told she had gallbladder stones and a mass on pancreas and was referred to cancer center. Then she presented to [facility] for same problem. Further evaluation done and CT multiple abnormalities.     Pt stated she had decreased appetite since thanksgiving due to the feeling of "food getting stuck in chest" as well as epigastric pain that radiates to her R shoulder. Symptoms have progressively worsened with decreased ability to tolerate PO. Pt reported a 40lbs unintentional weight loss since Nov 2019. She endorses minimal intake for the last 2 wks, constipation, acid reflux and N/V. Denied chills or night sweat. Denied abdominal pain, or diarrhea. Denied SOB, chest pain, cough or palpitation.   Patient never smoke. She don't drink alcohol. NKDA. Family Hx of breast cancer and colon cancer in her mother. She lives in her house with her granddaughter who has Autism.       "

## 2020-01-19 NOTE — ASSESSMENT & PLAN NOTE
CT/Xray showed Bilateral lung consolidation, patient afebrile, denied cough, SOB or chest pain.   + leukocytosis   Vanc and zosyn initiated outside the facility      Ddx: Most likely pneumonia, [place]?   Leukocytosis improved after starting Abx     Plan:   - Continue Vanc and zosyn   - Follow RIP, Respiratory CX, Blood Cx

## 2020-01-19 NOTE — ASSESSMENT & PLAN NOTE
Alkaline Phosphatase 55 - 135 U/L 170High   186High     AST 10 - 40 U/L 75High   74High     ALT 10 - 44 U/L 90High       Normal bili   CT showed: A 1 cm low-density abnormality is noted in the anterior segment of the right lobe of the liver as seen on axial image 24 and series 2.    Ddx: liver [place]?  Plan:   - Consult Oncology, appreciate recs   - Consult AES for biopsy of pancreatic mass

## 2020-01-19 NOTE — ASSESSMENT & PLAN NOTE
Patient had Nausea since the thanksgiving, vomiting started 2-weeks ago, symptoms associated with constipation, and wt loss. Patient looks euvolemic   Abdomen soft and positive BS   US abdomen: Distended fluid-filled stomach in the left upper quadrant.  Most likely related to pancreatic mass/ascites     Plan:   - Replete Electrolytes   - Volume resuscitation as needed   - NPO   - Added Zofran and phenergan PRN (Qtc 455)  - Further assessment ob pancreatic mass, AES consulted for biopsy   - Monitor the patient for any sign of surgical abdomen.

## 2020-01-19 NOTE — CONSULTS
[facility]  Advanced Endoscopy  Consult Note    Patient Name: Radha Alcazar  MRN: [id number]  Admission Date: 1/18/2020  Hospital Length of Stay: 1 days  Code Status: Full Code   Attending Provider: Diana Turcios MD   Consulting Provider: Domingo Cruz MD  Primary Care Physician: Hossein Mortensen MD  Principal Problem:Pancreatic mass    Inpatient consult to Advanced Endoscopy Service (AES)  Consult performed by: Domingo Cruz MD  Consult ordered by: Zena Bolden MD        Subjective:     HPI:  63 yo F with PMHx of HTN and appendectomy who presented with complaints of nausea and vomiting, inability to tolerate PO and generalized weakness, transferred from [facility] for evaluation of pancreatic mass.    Patient was hospitalized 1/10/2020 - 1/16/2020 at [facility] with acute onset nausea and vomiting, and dx with a mass on her pancreas. Discharged with outpatient follow-up, but returned to ED twice with intractable nausea/vomiting yesterday.  Reports 40 lb weight loss in the last 5 months with early satiety.  No jaundice, abdominal fullness, overt bleeding. Denies smoking and alcohol.    WBC 19 -> 15. Tbili 1.1, , AST 70s, ALT 90s. CA 19-9 >21K. INR 1.5.  U/S with CBD 0.58mm, distended fluid-filled stomach, suspected liver mass.  CT with distended stomach and duodenum, ascites, 1cm R lobe liver mass, lower omentum attenuation. Abnormal diminished attenuation in the region of the body and tail of the pancreas with fluid adjacent to these areas.  Findings could represent pancreatic neoplasm and/or pancreatitis.    Past Medical History:   Diagnosis Date    Hypertension        Past Surgical History:   Procedure Laterality Date    APPENDECTOMY         Family History   Problem Relation Age of Onset    Cancer Mother     Cancer Brother        Social History     Socioeconomic History    Marital status:      Spouse name: Not on file    Number of children: Not on file    Years of  education: Not on file    Highest education level: Not on file   Occupational History    Not on file   Social Needs    Financial resource strain: Not on file    Food insecurity:     Worry: Not on file     Inability: Not on file    Transportation needs:     Medical: Not on file     Non-medical: Not on file   Tobacco Use    Smoking status: Never Smoker    Smokeless tobacco: Never Used   Substance and Sexual Activity    Alcohol use: Never     Frequency: Never    Drug use: Never    Sexual activity: Not on file   Lifestyle    Physical activity:     Days per week: Not on file     Minutes per session: Not on file    Stress: Not on file   Relationships    Social connections:     Talks on phone: Not on file     Gets together: Not on file     Attends Restorationism service: Not on file     Active member of club or organization: Not on file     Attends meetings of clubs or organizations: Not on file     Relationship status: Not on file   Other Topics Concern    Not on file   Social History Narrative    Not on file       Current Facility-Administered Medications on File Prior to Encounter   Medication Dose Route Frequency Provider Last Rate Last Dose    [COMPLETED] potassium chloride SA CR tablet 60 mEq  60 mEq Oral Once Courtney Díaz MD   60 mEq at 01/18/20 1010    [DISCONTINUED] acetaminophen tablet 650 mg  650 mg Oral Q4H PRN Courtney Díaz MD        [DISCONTINUED] dextrose 50% injection 12.5 g  12.5 g Intravenous PRN Courtney Díaz MD        [DISCONTINUED] dextrose 50% injection 25 g  25 g Intravenous PRN Courtney Díaz MD        [DISCONTINUED] enoxaparin injection 40 mg  40 mg Subcutaneous Daily Courtney Díaz MD   40 mg at 01/17/20 2326    [DISCONTINUED] glucose chewable tablet 16 g  16 g Oral PRN Courtney Díaz MD        [DISCONTINUED] glucose chewable tablet 24 g  24 g Oral PRN Courtney Díaz MD        [DISCONTINUED] hydroCHLOROthiazide tablet 12.5 mg  12.5 mg Oral  Daily Courtney Díaz MD   12.5 mg at 01/18/20 1009    [DISCONTINUED] HYDROcodone-acetaminophen  mg per tablet 1 tablet  1 tablet Oral Q6H PRN Courtney Díaz MD   1 tablet at 01/17/20 2326    [DISCONTINUED] HYDROcodone-acetaminophen 5-325 mg per tablet 1 tablet  1 tablet Oral Q6H PRN Courtney Díaz MD        [DISCONTINUED] lisinopril tablet 20 mg  20 mg Oral QHS Courtney Díaz MD   20 mg at 01/17/20 2326    [DISCONTINUED] ondansetron injection 4 mg  4 mg Intravenous Q8H PRN Courtney Díaz MD        [DISCONTINUED] ondansetron injection 8 mg  8 mg Intravenous Q6H PRN Karen Hampton MD   8 mg at 01/18/20 0423    [DISCONTINUED] ondansetron injection 8 mg  8 mg Intravenous Q6H Karen Hampton MD   8 mg at 01/18/20 0800    [DISCONTINUED] pantoprazole EC tablet 40 mg  40 mg Oral Daily Courtney Díaz MD        [DISCONTINUED] piperacillin-tazobactam 4.5 g in dextrose 5 % 100 mL IVPB (ready to mix system)  4.5 g Intravenous Q8H Courtney Díaz MD 25 mL/hr at 01/18/20 1015 4.5 g at 01/18/20 1015    [DISCONTINUED] potassium, sodium phosphates 280-160-250 mg packet 1 packet  1 packet Oral Q4H While awake Courtney Díaz MD   1 packet at 01/18/20 1014    [DISCONTINUED] promethazine (PHENERGAN) 12.5 mg in dextrose 5 % 50 mL IVPB  12.5 mg Intravenous Q6H PRN Karen Hampton  mL/hr at 01/17/20 2348 12.5 mg at 01/17/20 2348    [DISCONTINUED] promethazine (PHENERGAN) 12.5 mg in dextrose 5 % 50 mL IVPB  12.5 mg Intravenous Q6H Karen Hampton MD        [DISCONTINUED] promethazine (PHENERGAN) 6.25 mg in dextrose 5 % 50 mL IVPB  6.25 mg Intravenous Q6H Courtney Díaz  mL/hr at 01/18/20 1508 6.25 mg at 01/18/20 1508    [DISCONTINUED] sodium chloride 0.9% flush 10 mL  10 mL Intravenous PRN Courtney Díaz MD        [DISCONTINUED] vancomycin - pharmacy to dose   Intravenous pharmacy to manage frequency Courtney Díaz MD        [DISCONTINUED] vancomycin 1.5  g in dextrose 5 % 250 mL IVPB (ready to mix)  1,500 mg Intravenous Q12H Courtney Díaz .7 mL/hr at 01/18/20 1011 1,500 mg at 01/18/20 1011     Current Outpatient Medications on File Prior to Encounter   Medication Sig Dispense Refill    lisinopril-hydrochlorothiazide (PRINZIDE,ZESTORETIC) 20-12.5 mg per tablet Take 1 tablet by mouth once daily.      ondansetron (ZOFRAN) 8 MG tablet Take 8 mg by mouth every 8 (eight) hours.      pantoprazole (PROTONIX) 40 MG tablet Take 40 mg by mouth once daily.      promethazine (PHENERGAN) 25 MG suppository Place 25 mg rectally every 6 (six) hours as needed for Nausea.      sucralfate (CARAFATE) 1 gram tablet Take 1 g by mouth 4 (four) times daily.         Review of patient's allergies indicates:  No Known Allergies    Review of Systems:   Constitutional: no fever, chills  Eyes: no visual changes   ENT: no sore throat or dysphagia  Respiratory: no cough or shortness of breath   Cardiovascular: no chest pain or palpitations   Gastrointestinal: as per HPI  Hematologic/Lymphatic: no easy bruising or lymphadenopathy   Musculoskeletal: no arthralgias or myalgias   Neurological: no change in mental status  Behavioral/Psych: no change in mood    Objective:     Vitals:    01/19/20 0300   BP: (!) 166/80   Pulse: 90   Resp: 16   Temp: 97.6 °F (36.4 °C)       General: Alert and Oriented, no distress  HEENT: Normocephalic, Atraumatic. No scleral icterus.  Resp: Good air entry bilaterally, no adventitious sounds  Cardiac: S1 and S2 normal  Abdomen: Normoactive bowel sounds. Non-distended. Normal tympany. Soft. Non-tender. No peritoneal signs.  Extremities: No peripheral edema.   Neurologic: No gross neurological Deficits  Psych: Calm, cooperative. Normal mood and affect.    Significant Labs:  Recent Labs   Lab 01/17/20  1700 01/18/20  0525 01/19/20  0408   HGB 13.7 11.6* 12.1       Lab Results   Component Value Date    WBC 14.03 (H) 01/19/2020    HGB 12.1 01/19/2020    HCT 37.4  01/19/2020    MCV 94 01/19/2020     (H) 01/19/2020       Lab Results   Component Value Date     (L) 01/19/2020    K 2.9 (L) 01/19/2020    CL 95 01/19/2020    CO2 26 01/19/2020    BUN 12 01/19/2020    CREATININE 1.0 01/19/2020    CALCIUM 8.3 (L) 01/19/2020    ANIONGAP 14 01/19/2020    ESTGFRAFRICA >60.0 01/19/2020    EGFRNONAA 59.7 (A) 01/19/2020       Lab Results   Component Value Date    ALT 90 (H) 01/19/2020    AST 79 (H) 01/19/2020    ALKPHOS 164 (H) 01/19/2020    BILITOT 0.7 01/19/2020       Lab Results   Component Value Date    INR 1.5 (H) 01/18/2020       Significant Imaging:  Reviewed pertinent radiology findings.       Assessment/Plan:     * Pancreatic mass  63 yo F with PMHx of HTN and appendectomy who presented with complaints of nausea and vomiting, inability to tolerate PO and generalized weakness, transferred from [facility] for evaluation of pancreatic mass.    Mildly elevated AST and ALT. Normal Tbili and AP.  CT with distended stomach and duodenum, ascites, 1cm R lobe liver mass, lower omentum attenuation. Abnormal diminished attenuation in the region of the body and tail of the pancreas with fluid adjacent to these areas.  Findings could represent pancreatic neoplasm and/or pancreatitis.  CA 19-9 markedly elevated.    - Plan for EUS with biopsy tomorrow  - Please keep NPO given high risk of aspiration. Place NG tube for decompression.      Thank you for your consult. I will follow-up with patient. Please contact us if you have any additional questions.    Domingo Cruz MD  Gastroenterology  [facility]

## 2020-01-19 NOTE — NURSING
Medicines scanned in computer but not saving. Entered manually after wrappers thrown away and after the fact. Computer giving appearance of complete scanning procedure at time of administration.

## 2020-01-19 NOTE — ASSESSMENT & PLAN NOTE
BP elevated since the admission, most likely from the pain and anxiety     Plan:   - Resume home Lisinopril and hydrochlorothiazide   - Monitor and adjust meds as needed

## 2020-01-19 NOTE — ASSESSMENT & PLAN NOTE
CT showed moderate bilateral pleural effusions. Patient Asymptomatic.   May consider thoracentesis and fluid analysis for further diagnosis

## 2020-01-19 NOTE — PHARMACY MED REC
"Admission Medication Reconciliation - Pharmacy Consult Note    The home medication history was taken by Dorcas Aguilar CPhT.  Based on information gathered and subsequent review by the clinical pharmacist, the items below may need attention.    You may go to "Admission" then "Reconcile Home Medications" tabs to review and/or act upon these items.        No issues noted with the medication reconciliation.        Potential issues to be addressed PRIOR TO DISCHARGE  o N/A    Please address this information as you see fit.  Feel free to contact us if you have any questions or require assistance.    Maylin Ta, PharmD  PGY-2 Pharmacy Resident- Internal Medicine  [phone] [id number]                .  .          "

## 2020-01-19 NOTE — ASSESSMENT & PLAN NOTE
CT/Xray showed Bilateral lung consolidation, patient afebrile, denied cough, SOB or chest pain.   + leukocytosis   Vanc and zosyn initiated outside the facility      Ddx: Most likely pneumonia, [place]?   Leukocytosis improved after starting Abx     Plan:   - Continue Vanc and zosyn   - Follow RIP, Respiratory CX, Blood Cx

## 2020-01-19 NOTE — PROGRESS NOTES
"[facility] Hospital Medicine  Progress Note    Patient Name: Radha Alcazar  MRN: [id number]  Patient Class: IP- Inpatient   Admission Date: 1/18/2020  Length of Stay: 1 days  Attending Physician: Diana Turcios MD  Primary Care Provider: Hossein Mortensen MD    [facility] Medicine Team: [facility] HOSP MED 3 Zena Bolden MD    Subjective:     Principal Problem:Pancreatic mass        HPI:  Ms. Alcazar is a 63 yo F with PMHx of HTN and appendectomy who transferred from [facility] for further evaluation of pancreatic mass be hem/Onc and possible biopsy by EUS.     Initially patient hospitalized in [facility] ([facility]) for persistent vomiting and R shoulder pain. CT scan performed at [facility] and was told she had gallbladder stones and a mass on pancreas and was referred to cancer center. Then she presented to [facility] for same problem. Further evaluation done and CT multiple abnormalities.     Pt stated she had decreased appetite since thanksgiving due to the feeling of "food getting stuck in chest" as well as epigastric pain that radiates to her R shoulder. Symptoms have progressively worsened with decreased ability to tolerate PO. Pt reported a 40lbs unintentional weight loss since Nov 2019. She endorses minimal intake for the last 2 wks, constipation, acid reflux and N/V. Denied chills or night sweat. Denied abdominal pain, or diarrhea. Denied SOB, chest pain, cough or palpitation.   Patient never smoke. She don't drink alcohol. NKDA. Family Hx of breast cancer and colon cancer in her mother. She lives in her house with her granddaughter who has Autism.         Overview/Hospital Course:  Patient admitted for further evaluation for pancreatic mass.   CT abdomen (1/17) revealed multiple abnormalities: Distended stomach, 1st, 2nd and 3rd portions of the duodenum suggestive of an obstructive process.  Ascites in the abdomen and pelvis and abnormal low-density foci along the capsule of the right " lobe of the liver laterally and inferiorly suspicious for carcinomatosis. Abnormal diminished attenuation in the region of the body and tail of the pancreas with fluid adjacent to these areas.  Findings could represent pancreatic neoplasm and/or pancreatitis. Bilateral pleural effusions with airspace consolidation in the lower lobes bilaterally as well as in the right middle lobe.     Patient didn't had BM for couple of days. Concern about bowel obstruction overnight, Xray negative for obstruction. Patient on clear liquid diet and NPO after midnight for biopsy by AES 1/20.     Interval History: Patient today feels better comparing to last night, she was active, smiling and answering questions. AES for biopsy tomorrow.     Review of Systems   Constitutional: Positive for activity change, appetite change, fatigue and unexpected weight change. Negative for chills, diaphoresis and fever.   HENT: Negative for congestion and trouble swallowing.    Eyes: Negative for pain.   Respiratory: Negative for cough, shortness of breath and wheezing.    Cardiovascular: Negative for chest pain, palpitations and leg swelling.   Gastrointestinal: Positive for constipation, nausea and vomiting. Negative for abdominal distention, abdominal pain, blood in stool and diarrhea.   Genitourinary: Negative for dysuria, enuresis, flank pain, frequency and hematuria.   Musculoskeletal: Positive for back pain.   Skin: Negative for color change.   Psychiatric/Behavioral: Negative for agitation.     Objective:     Vital Signs (Most Recent):  Temp: 97.9 °F (36.6 °C) (01/19/20 0910)  Pulse: 83 (01/19/20 0910)  Resp: 20 (01/19/20 0910)  BP: (!) 165/79 (01/19/20 0910)  SpO2: 96 % (01/19/20 0910) Vital Signs (24h Range):  Temp:  [96.7 °F (35.9 °C)-99.8 °F (37.7 °C)] 97.9 °F (36.6 °C)  Pulse:  [81-95] 83  Resp:  [16-20] 20  SpO2:  [94 %-97 %] 96 %  BP: (134-193)/(67-91) 165/79     Weight: 77.6 kg (171 lb)  Body mass index is 23.85 kg/m².  No intake or  "output data in the 24 hours ending 01/19/20 0921   Physical Exam   Constitutional: She is oriented to person, place, and time. She appears well-developed and well-nourished.   Patient look tired and in pain and bad acid reflux    HENT:   Head: Normocephalic and atraumatic.   Eyes: Pupils are equal, round, and reactive to light. EOM are normal.   Neck: Normal range of motion. Neck supple.   Cardiovascular: Normal rate and regular rhythm.   No murmur heard.  Pulmonary/Chest: Effort normal and breath sounds normal.   Abdominal: Soft. Bowel sounds are normal. She exhibits distension. There is no tenderness.   Old midline lower abdominal incision scar from ruptured appendix.     Musculoskeletal: Normal range of motion. She exhibits no edema or deformity.   Neurological: She is alert and oriented to person, place, and time.   Skin: Skin is warm and dry.   Psychiatric:   Depressed mood.       Significant Labs: All pertinent labs within the past 24 hours have been reviewed.    Significant Imaging: I have reviewed and interpreted all pertinent imaging results/findings within the past 24 hours.      Assessment/Plan:      * Pancreatic mass  Ms. Alcazar is a 65 yo F with PMHx of HTN and appendectomy who transferred from [facility] for further evaluation of pancreatic mass be hem/Onc and possible biopsy by EUS.    Pt stated she had decreased appetite since thanksgiving due to the feeling of "food getting stuck in chest" as well as epigastric pain that radiates to her R shoulder. Symptoms have progressively worsened with decreased ability to tolerate PO. Pt reported a 40lbs unintentional weight loss since Nov 2019. She endorses minimal intake for the last 2 wks, constipation, acid reflux and N/V. Denied chills or night sweat. Denied abdominal pain, or diarrhea.     CT abdomen with multiple abnormalities: Distended stomach, 1st, 2nd and 3rd portions of the duodenum suggestive of an obstructive process.  Ascites " in the abdomen and pelvis and abnormal low-density foci along the capsule of the right lobe of the liver laterally and inferiorly suspicious for carcinomatosis. Abnormal diminished attenuation in the region of the body and tail of the pancreas with fluid adjacent to these areas.  Findings could represent pancreatic neoplasm and/or pancreatitis. Bilateral pleural effusions with airspace consolidation in the lower lobes bilaterally as well as in the right middle lobe.    Giving the presentation and CT finding, concerning for cancer process       Plan:   - Oncology consulted, appreciate recs   - AES consulted for possible biopsy, patient NPO by midnight         Depressed mood  Patient in very depressed mood due to her medical condition. She feels depressed, lost of intrest. No planning of harming herself or others. Difficulty sleeping related to the Acid reflux. She doesn't feels guilty or had dec of concentration.     Plan:   - Will consider starting SSRI once later once definitive diagnoses is establish and no improvement in her condition.       Normocytic anemia  Hb on admission 11.6  Iron WNL. Transferrin and TIBC decrease   Workup showed most likely to be Anemia of chronic disease     Plan:   - Daily CBC         Leukocytosis        Transaminitis    . AST 75 and ALT 90  Normal bili   CT showed: A 1 cm low-density abnormality is noted in the anterior segment of the right lobe of the liver as seen on axial image 24 and series 2.    Ddx: liver [place]?  Plan:   - Consult Oncology, appreciate recs   - Consult AES for biopsy of pancreatic mass         Hypokalemia  - Replete as needed       Abnormal CT of the abdomen  See pancreatic mass       Pleural effusion  CT showed moderate bilateral pleural effusions. Patient Asymptomatic.   May consider thoracentesis and fluid analysis for further diagnosis       Lung consolidation  CT/Xray showed Bilateral lung consolidation, patient afebrile, denied cough, SOB or chest  pain.   + leukocytosis   Vanc and zosyn initiated outside the facility      Ddx: Most likely pneumonia, [place]?   Leukocytosis improved after starting Abx     Plan:   - Continue Vanc and zosyn   - Follow RIP, Respiratory CX, Blood Cx     Intractable nausea and vomiting  Patient had Nausea since the thanksgiving, vomiting started 2-weeks ago, symptoms associated with constipation, and wt loss. Patient looks euvolemic   Abdomen soft and positive BS   US abdomen: Distended fluid-filled stomach in the left upper quadrant.  Most likely related to pancreatic mass/ascites     Plan:   - Replete Electrolytes   - Volume resuscitation as needed   - NPO   - Added Zofran and phenergan PRN (Qtc 455)  - Further assessment ob pancreatic mass, AES consulted for biopsy   - Monitor the patient for any sign of surgical abdomen.           Hypertension  BP elevated since the admission, most likely from the pain and anxiety     Plan:   - Resume home Lisinopril and hydrochlorothiazide   - Monitor and adjust meds as needed         VTE Risk Mitigation (From admission, onward)         Ordered     Place sequential compression device  Until discontinued      01/18/20 1805     IP VTE LOW RISK PATIENT  Once      01/18/20 1805                      Zena Bolden MD  Department of Hospital Medicine   [facility]

## 2020-01-19 NOTE — SUBJECTIVE & OBJECTIVE
Interval History: Patient today feels better comparing to last night, she was active, smiling and answering questions. AES for biopsy tomorrow.     Review of Systems   Constitutional: Positive for activity change, appetite change, fatigue and unexpected weight change. Negative for chills, diaphoresis and fever.   HENT: Negative for congestion and trouble swallowing.    Eyes: Negative for pain.   Respiratory: Negative for cough, shortness of breath and wheezing.    Cardiovascular: Negative for chest pain, palpitations and leg swelling.   Gastrointestinal: Positive for constipation, nausea and vomiting. Negative for abdominal distention, abdominal pain, blood in stool and diarrhea.   Genitourinary: Negative for dysuria, enuresis, flank pain, frequency and hematuria.   Musculoskeletal: Positive for back pain.   Skin: Negative for color change.   Psychiatric/Behavioral: Negative for agitation.     Objective:     Vital Signs (Most Recent):  Temp: 97.9 °F (36.6 °C) (01/19/20 0910)  Pulse: 83 (01/19/20 0910)  Resp: 20 (01/19/20 0910)  BP: (!) 165/79 (01/19/20 0910)  SpO2: 96 % (01/19/20 0910) Vital Signs (24h Range):  Temp:  [96.7 °F (35.9 °C)-99.8 °F (37.7 °C)] 97.9 °F (36.6 °C)  Pulse:  [81-95] 83  Resp:  [16-20] 20  SpO2:  [94 %-97 %] 96 %  BP: (134-193)/(67-91) 165/79     Weight: 77.6 kg (171 lb)  Body mass index is 23.85 kg/m².  No intake or output data in the 24 hours ending 01/19/20 0921   Physical Exam   Constitutional: She is oriented to person, place, and time. She appears well-developed and well-nourished.   Patient look tired and in pain and bad acid reflux    HENT:   Head: Normocephalic and atraumatic.   Eyes: Pupils are equal, round, and reactive to light. EOM are normal.   Neck: Normal range of motion. Neck supple.   Cardiovascular: Normal rate and regular rhythm.   No murmur heard.  Pulmonary/Chest: Effort normal and breath sounds normal.   Abdominal: Soft. Bowel sounds are normal. She exhibits distension.  There is no tenderness.   Old midline lower abdominal incision scar from ruptured appendix.     Musculoskeletal: Normal range of motion. She exhibits no edema or deformity.   Neurological: She is alert and oriented to person, place, and time.   Skin: Skin is warm and dry.   Psychiatric:   Depressed mood.       Significant Labs: All pertinent labs within the past 24 hours have been reviewed.    Significant Imaging: I have reviewed and interpreted all pertinent imaging results/findings within the past 24 hours.

## 2020-01-19 NOTE — H&P
"[facility] Medicine  History & Physical    Patient Name: Radha Alcazar  MRN: [id number]  Admission Date: 1/18/2020  Attending Physician: Diana Turcios MD   Primary Care Provider: Hossein Mortensen MD    [facility] Medicine Team: [facility] MED 3 Zena Bolden MD     Patient information was obtained from patient, past medical records and ER records.     Subjective:     Principal Problem:Pancreatic mass    Chief Complaint: No chief complaint on file.       HPI: Ms. Alcazar is a 65 yo F with PMHx of HTN and appendectomy who transferred from [facility] for further evaluation of pancreatic mass be hem/Onc and possible biopsy by EUS.     Initially patient hospitalized in [facility] ([facility]) for persistent vomiting and R shoulder pain. CT scan performed at [facility] and was told she had gallbladder stones and a mass on pancreas and was referred to cancer center. Then she presented to [facility] for same problem. Further evaluation done and CT multiple abnormalities.     Pt stated she had decreased appetite since thanksgiving due to the feeling of "food getting stuck in chest" as well as epigastric pain that radiates to her R shoulder. Symptoms have progressively worsened with decreased ability to tolerate PO. Pt reported a 40lbs unintentional weight loss since Nov 2019. She endorses minimal intake for the last 2 wks, constipation, acid reflux and N/V. Denied chills or night sweat. Denied abdominal pain, or diarrhea. Denied SOB, chest pain, cough or palpitation.   Patient never smoke. She don't drink alcohol. NKDA. Family Hx of breast cancer and colon cancer in her mother. She lives in her house with her granddaughter who has Autism.         Past Medical History:   Diagnosis Date    Hypertension        Past Surgical History:   Procedure Laterality Date    APPENDECTOMY         Review of patient's allergies indicates:  No Known Allergies    Current Facility-Administered Medications on " File Prior to Encounter   Medication    [COMPLETED] iohexol (OMNIPAQUE 350) injection 80 mL    [COMPLETED] levoFLOXacin 750 mg/150 mL IVPB 750 mg    [COMPLETED] piperacillin-tazobactam 4.5 g in dextrose 5 % 100 mL IVPB (ready to mix system)    [COMPLETED] potassium chloride SA CR tablet 60 mEq    [COMPLETED] sodium chloride 0.9% bolus 1,000 mL    [COMPLETED] vancomycin in dextrose 5 % 1 gram/250 mL IVPB 1,000 mg    [DISCONTINUED] acetaminophen tablet 650 mg    [DISCONTINUED] dextrose 50% injection 12.5 g    [DISCONTINUED] dextrose 50% injection 25 g    [DISCONTINUED] enoxaparin injection 40 mg    [DISCONTINUED] glucose chewable tablet 16 g    [DISCONTINUED] glucose chewable tablet 24 g    [DISCONTINUED] hydroCHLOROthiazide tablet 12.5 mg    [DISCONTINUED] HYDROcodone-acetaminophen  mg per tablet 1 tablet    [DISCONTINUED] HYDROcodone-acetaminophen 5-325 mg per tablet 1 tablet    [DISCONTINUED] labetalol injection 10 mg    [DISCONTINUED] lisinopril tablet 20 mg    [DISCONTINUED] ondansetron injection 4 mg    [DISCONTINUED] ondansetron injection 4 mg    [DISCONTINUED] ondansetron injection 8 mg    [DISCONTINUED] ondansetron injection 8 mg    [DISCONTINUED] pantoprazole EC tablet 40 mg    [DISCONTINUED] piperacillin-tazobactam 4.5 g in dextrose 5 % 100 mL IVPB (ready to mix system)    [DISCONTINUED] potassium, sodium phosphates 280-160-250 mg packet 1 packet    [DISCONTINUED] promethazine (PHENERGAN) 12.5 mg in dextrose 5 % 50 mL IVPB    [DISCONTINUED] promethazine (PHENERGAN) 12.5 mg in dextrose 5 % 50 mL IVPB    [DISCONTINUED] promethazine (PHENERGAN) 6.25 mg in dextrose 5 % 50 mL IVPB    [DISCONTINUED] promethazine (PHENERGAN) 6.25 mg in dextrose 5 % 50 mL IVPB    [DISCONTINUED] sodium chloride 0.9% flush 10 mL    [DISCONTINUED] vancomycin - pharmacy to dose    [DISCONTINUED] vancomycin 1.5 g in dextrose 5 % 250 mL IVPB (ready to mix)     Current Outpatient Medications on File  Prior to Encounter   Medication Sig    lisinopril-hydrochlorothiazide (PRINZIDE,ZESTORETIC) 20-12.5 mg per tablet Take 1 tablet by mouth once daily.    ondansetron (ZOFRAN) 8 MG tablet Take 8 mg by mouth every 8 (eight) hours.    pantoprazole (PROTONIX) 40 MG tablet Take 40 mg by mouth once daily.    promethazine (PHENERGAN) 25 MG suppository Place 25 mg rectally every 6 (six) hours as needed for Nausea.    sucralfate (CARAFATE) 1 gram tablet Take 1 g by mouth 4 (four) times daily.     Family History     Problem Relation (Age of Onset)    Cancer Mother, Brother        Tobacco Use    Smoking status: Never Smoker    Smokeless tobacco: Never Used   Substance and Sexual Activity    Alcohol use: Never     Frequency: Never    Drug use: Never    Sexual activity: Not on file     Review of Systems   Constitutional: Positive for activity change, appetite change, fatigue and unexpected weight change. Negative for chills, diaphoresis and fever.   HENT: Negative for congestion and trouble swallowing.    Eyes: Negative for pain.   Respiratory: Negative for cough, shortness of breath and wheezing.    Cardiovascular: Negative for chest pain, palpitations and leg swelling.   Gastrointestinal: Positive for constipation, nausea and vomiting. Negative for abdominal distention, abdominal pain, blood in stool and diarrhea.   Genitourinary: Negative for dysuria, enuresis, flank pain, frequency and hematuria.   Musculoskeletal: Positive for back pain.   Skin: Negative for color change.   Psychiatric/Behavioral: Negative for agitation.     Objective:     Vital Signs (Most Recent):  Temp: 99.3 °F (37.4 °C) (01/18/20 1715)  Pulse: 87 (01/18/20 1715)  Resp: 20 (01/18/20 1715)  BP: (!) 193/91 (01/18/20 1715)  SpO2: 96 % (01/18/20 1715) Vital Signs (24h Range):  Temp:  [96.7 °F (35.9 °C)-99.3 °F (37.4 °C)] 99.3 °F (37.4 °C)  Pulse:  [79-98] 87  Resp:  [13-20] 20  SpO2:  [95 %-97 %] 96 %  BP: (134-193)/(70-95) 193/91        There is no  height or weight on file to calculate BMI.    Physical Exam   Constitutional: She is oriented to person, place, and time. She appears well-developed and well-nourished.   Patient look tired and in pain and bad acid reflux    HENT:   Head: Normocephalic and atraumatic.   Eyes: Pupils are equal, round, and reactive to light. EOM are normal.   Neck: Normal range of motion. Neck supple.   Cardiovascular: Normal rate and regular rhythm.   No murmur heard.  Pulmonary/Chest: Effort normal and breath sounds normal.   Abdominal: Soft. Bowel sounds are normal. She exhibits distension. There is no tenderness.   Old midline lower abdominal incision scar from ruptured appendix.     Musculoskeletal: Normal range of motion. She exhibits no edema or deformity.   Neurological: She is alert and oriented to person, place, and time.   Skin: Skin is warm and dry.   Psychiatric:   Depressed mood.         CRANIAL NERVES     CN III, IV, VI   Pupils are equal, round, and reactive to light.  Extraocular motions are normal.        Significant Labs:   CBC:   Recent Labs   Lab 01/17/20  1700 01/18/20  0525   WBC 19.67* 15.34*   HGB 13.7 11.6*   HCT 41.0 34.3*   * 390*     CMP:   Recent Labs   Lab 01/17/20  1700 01/18/20  0526    134*   K 3.6 3.1*   CL 94* 96   CO2 25 26   GLU 96 106   BUN 9 11   CREATININE 0.6 0.6   CALCIUM 8.8 7.9*   PROT 7.2 5.9*   ALBUMIN 2.6* 2.2*   BILITOT 1.1* 0.9   ALKPHOS 186* 170*   AST 74* 75*   ALT 98* 90*   ANIONGAP 17* 12   EGFRNONAA >60.0 >60.0     All pertinent labs within the past 24 hours have been reviewed.    Significant Imaging: I have reviewed and interpreted all pertinent imaging results/findings within the past 24 hours.    Assessment/Plan:     * Pancreatic mass  Ms. Alcazar is a 63 yo F with PMHx of HTN and appendectomy who transferred from [facility] for further evaluation of pancreatic mass be hem/Onc and possible biopsy by EUS.    Pt stated she had decreased appetite  "since thanksgiving due to the feeling of "food getting stuck in chest" as well as epigastric pain that radiates to her R shoulder. Symptoms have progressively worsened with decreased ability to tolerate PO. Pt reported a 40lbs unintentional weight loss since Nov 2019. She endorses minimal intake for the last 2 wks, constipation, acid reflux and N/V. Denied chills or night sweat. Denied abdominal pain, or diarrhea.     CT abdomen with multiple abnormalities: Distended stomach, 1st, 2nd and 3rd portions of the duodenum suggestive of an obstructive process.  Ascites in the abdomen and pelvis and abnormal low-density foci along the capsule of the right lobe of the liver laterally and inferiorly suspicious for carcinomatosis. Abnormal diminished attenuation in the region of the body and tail of the pancreas with fluid adjacent to these areas.  Findings could represent pancreatic neoplasm and/or pancreatitis. Bilateral pleural effusions with airspace consolidation in the lower lobes bilaterally as well as in the right middle lobe.    Giving the presentation and CT finding, concerning for cancer process       Plan:   - Oncology consulted, appreciate recs   - AES consulted for possible biopsy, patient NPO by midnight         Depressed mood  Patient in very depressed mood due to her medical condition. She feels depressed, lost of intrest. No planning of harming herself or others. Difficulty sleeping related to the Acid reflux. She doesn't feels guilty or had dec of concentration.     Plan:   - Will consider starting SSRI once later once definitive diagnoses is establish and no improvement in her condition.       Normocytic anemia  Hb on admission 11.6    Plan:   - Daily CBC   - Iron panel       Leukocytosis        Transaminitis    AST 75  ALT 90   Normal bili   CT showed: A 1 cm low-density abnormality is noted in the anterior segment of the right lobe of the liver as seen on axial image 24 and series 2.    Ddx: liver " Braham?  Plan:   - Consult Oncology, appreciate recs   - Consult AES for biopsy of pancreatic mass         Hypokalemia  - Replete as needed       Abnormal CT of the abdomen  See pancreatic mass       Pleural effusion  CT showed moderate bilateral pleural effusions. Patient Asymptomatic.   May consider thoracentesis and fluid analysis for further diagnosis       Lung consolidation  CT/Xray showed Bilateral lung consolidation, patient afebrile, denied cough, SOB or chest pain.   + leukocytosis   Vanc and zosyn initiated outside the facility      Ddx: Most likely pneumonia, Yuliya?   Leukocytosis improved after starting Abx     Plan:   - Continue Vanc and zosyn   - Follow RIP, Respiratory CX, Blood Cx     Intractable nausea and vomiting  Patient had Nausea since the thanksgiving, vomiting started 2-weeks ago, symptoms associated with constipation, and wt loss. Patient looks euvolemic   Abdomen soft and positive BS   US abdomen: Distended fluid-filled stomach in the left upper quadrant.  Most likely related to pancreatic mass/ascites     Plan:   - Replete Electrolytes   - Volume resuscitation as needed   - NPO   - Added Zofran and phenergan PRN (Qtc 455)  - Further assessment ob pancreatic mass, AES consulted for biopsy   - Monitor the patient for any sign of surgical abdomen.           Hypertension  BP elevated since the admission, most likely from the pain and anxiety     Plan:   - Resume home Lisinopril and hydrochlorothiazide   - Monitor and adjust meds as needed         VTE Risk Mitigation (From admission, onward)         Ordered     Place sequential compression device  Until discontinued      01/18/20 1805     IP VTE LOW RISK PATIENT  Once      01/18/20 1805                   Zena Bolden MD  Department of Hospital Medicine   [facility]

## 2020-01-19 NOTE — CONSULTS
[facility]  Hematology/Oncology  Consult Note    Patient Name: Radha Alcazar  MRN: [id number]  Admission Date: 1/18/2020  Hospital Length of Stay: 1 days  Code Status: Full Code   Attending Provider: Diana Turcios MD  Consulting Provider: Hood Baker MD  Primary Care Physician: Hossein Mortensen MD  Principal Problem:Pancreatic mass    Inpatient consult to Hematology/Oncology  Consult performed by: Hood Baker MD  Consult ordered by: Zena Bolden MD  Reason for consult: pancreatic mass        Subjective:     HPI:  Radha Alcazar is a 64 y.o. female presenting from OSH for abnormal imaging.     Endorse a couple months of early satiety and dysphagia. CT on 1/17/20 shows a very abnormal pancreas with concerns for liver and omental spread. Read as concern for pancreatitis versus malignant process.     CA 19-9 21K, AFP normal.     She is a non smoker and does have a family history of cancers.       Oncology Treatment Plan:   [No treatment plan]    Medications:  Continuous Infusions:   lactated ringers 75 mL/hr at 01/19/20 1215     Scheduled Meds:   lisinopril  20 mg Oral Daily    And    hydroCHLOROthiazide  12.5 mg Oral Daily    pantoprozole (PROTONIX) IV  40 mg Intravenous Daily    piperacillin-tazobactam (ZOSYN) IVPB  4.5 g Intravenous Q8H    polyethylene glycol  17 g Oral Daily    potassium chloride in water  10 mEq Intravenous Once    potassium chloride in water  10 mEq Intravenous Q1H    vancomycin (VANCOCIN) IVPB  1,500 mg Intravenous Q24H     PRN Meds:acetaminophen, [START ON 1/20/2020] bisacodyl, HYDROcodone-acetaminophen, morphine, ondansetron, promethazine (PHENERGAN) IVPB, sodium chloride 0.9%, zolpidem     Review of patient's allergies indicates:  No Known Allergies     Past Medical History:   Diagnosis Date    Hypertension      Past Surgical History:   Procedure Laterality Date    APPENDECTOMY       Family History     Problem Relation (Age of Onset)    Cancer  Mother, Brother        Tobacco Use    Smoking status: Never Smoker    Smokeless tobacco: Never Used   Substance and Sexual Activity    Alcohol use: Never     Frequency: Never    Drug use: Never    Sexual activity: Not on file       Review of Systems   Constitutional: Positive for activity change, appetite change, fatigue and unexpected weight change.   HENT: Positive for trouble swallowing. Negative for dental problem and mouth sores.    Eyes: Negative for photophobia, redness and visual disturbance.   Respiratory: Negative for shortness of breath.    Cardiovascular: Negative for chest pain, palpitations and leg swelling.   Gastrointestinal: Positive for abdominal distention, abdominal pain, constipation, nausea and vomiting. Negative for anal bleeding and diarrhea.   Endocrine: Negative for polydipsia, polyphagia and polyuria.   Genitourinary: Negative for decreased urine volume, dysuria and urgency.   Musculoskeletal: Negative for arthralgias, back pain and myalgias.   Allergic/Immunologic: Negative for immunocompromised state.   Neurological: Positive for weakness. Negative for dizziness and headaches.   Hematological: Negative for adenopathy. Does not bruise/bleed easily.   Psychiatric/Behavioral: Negative for agitation, behavioral problems and confusion.     Objective:     Vital Signs (Most Recent):  Temp: 97.7 °F (36.5 °C) (01/19/20 1203)  Pulse: 79 (01/19/20 1203)  Resp: 16 (01/19/20 1203)  BP: (!) 144/68 (01/19/20 1203)  SpO2: 97 % (01/19/20 1203) Vital Signs (24h Range):  Temp:  [97.6 °F (36.4 °C)-99.8 °F (37.7 °C)] 97.7 °F (36.5 °C)  Pulse:  [79-95] 79  Resp:  [16-20] 16  SpO2:  [94 %-97 %] 97 %  BP: (143-193)/(67-91) 144/68     Weight: 77.6 kg (171 lb)  Body mass index is 23.85 kg/m².  Body surface area is 1.97 meters squared.      Intake/Output Summary (Last 24 hours) at 1/19/2020 6798  Last data filed at 1/19/2020 0995  Gross per 24 hour   Intake 100 ml   Output --   Net 100 ml       Physical Exam    Constitutional: She is oriented to person, place, and time.   Middle aged female, laying in bed, appears tired   HENT:   Head: Normocephalic and atraumatic.   Eyes: Pupils are equal, round, and reactive to light. EOM are normal.   Neck: Normal range of motion. Neck supple.   Cardiovascular: Normal rate and regular rhythm.   Pulmonary/Chest: Effort normal and breath sounds normal.   Abdominal: Soft. Bowel sounds are normal. She exhibits no distension. There is tenderness.   Musculoskeletal: Normal range of motion. She exhibits no edema.   Lymphadenopathy:     She has no cervical adenopathy.   Neurological: She is alert and oriented to person, place, and time.   Skin: Skin is warm. No pallor.   Psychiatric: She has a normal mood and affect. Her behavior is normal.   Vitals reviewed.    Significant Labs:   CBC:   Recent Labs   Lab 01/17/20  1700 01/18/20  0525 01/19/20  0408   WBC 19.67* 15.34* 14.03*   HGB 13.7 11.6* 12.1   HCT 41.0 34.3* 37.4   * 390* 434*    and CMP:   Recent Labs   Lab 01/17/20  1700 01/18/20  0526 01/19/20  0408    134* 135*   K 3.6 3.1* 2.9*   CL 94* 96 95   CO2 25 26 26   GLU 96 106 108   BUN 9 11 12   CREATININE 0.6 0.6 1.0   CALCIUM 8.8 7.9* 8.3*   PROT 7.2 5.9* 5.9*   ALBUMIN 2.6* 2.2* 2.3*   BILITOT 1.1* 0.9 0.7   ALKPHOS 186* 170* 164*   AST 74* 75* 79*   ALT 98* 90* 90*   ANIONGAP 17* 12 14   EGFRNONAA >60.0 >60.0 59.7*       Diagnostic Results:  I have reviewed all pertinent imaging results/findings within the past 24 hours.    Assessment/Plan:     * Pancreatic mass  Certainly concerning for stage IV pancreatic cancer, though will require a tissue diagnosis. Discussed this with the patient and will defer more in depth conversation pending diagnosis. She lives in [place], [place] and follow up probably more appropriate closer to home. Good PS though. Not likely to be a surgical candidate with CT findings.   -would proceed with biopsy of the mass  -will await results of path  -if  confirmed panc ca, will require BRCA testing, PD-L1, MSI and MMR testing  -she would like follow up to be scheduled closer to home - if cancer  -would get CT chest to complete staging as an inpatient    Thank you for your consult. I will follow-up with patient. Please contact us if you have any additional questions.    Hood Baker MD  Hematology/Oncology  [facility]  Sama: [phone]    Attending Note  I have personally taken the history and examined this patient and agree with the fellow's note as stated above.  Awaiting biopsy  Answered multiple questions from patient and family

## 2020-01-19 NOTE — HPI
Radha Alcazar is a 64 y.o. female presenting from OSH for abnormal imaging.     Endorse a couple months of early satiety and dysphagia. CT on 1/17/20 shows a very abnormal pancreas with concerns for liver and omental spread. Read as concern for pancreatitis versus malignant process.     CA 19-9 21K, AFP normal.     She is a non smoker and does have a family history of cancers.

## 2020-01-19 NOTE — ASSESSMENT & PLAN NOTE
Hb on admission 11.6  Iron WNL. Transferrin and TIBC decrease   Workup showed most likely to be Anemia of chronic disease     Plan:   - Daily CBC

## 2020-01-19 NOTE — ASSESSMENT & PLAN NOTE
Patient in very depressed mood due to her medical condition. She feels depressed, lost of intrest. No planning of harming herself or others. Difficulty sleeping related to the Acid reflux. She doesn't feels guilty or had dec of concentration.     Plan:   - Will consider starting SSRI once later once definitive diagnoses is establish and no improvement in her condition.

## 2020-01-19 NOTE — ASSESSMENT & PLAN NOTE
65 yo F with PMHx of HTN and appendectomy who presented with complaints of nausea and vomiting, inability to tolerate PO and generalized weakness, transferred from [facility] for evaluation of pancreatic mass.    Mildly elevated AST and ALT. Normal Tbili and AP.  CT with distended stomach and duodenum, ascites, 1cm R lobe liver mass, lower omentum attenuation. Abnormal diminished attenuation in the region of the body and tail of the pancreas with fluid adjacent to these areas.  Findings could represent pancreatic neoplasm and/or pancreatitis.  CA 19-9 markedly elevated.    - Plan for EUS with biopsy tomorrow  - Please keep NPO given high risk of aspiration. Place NG tube for decompression.

## 2020-01-19 NOTE — SUBJECTIVE & OBJECTIVE
Past Medical History:   Diagnosis Date    Hypertension        Past Surgical History:   Procedure Laterality Date    APPENDECTOMY         Review of patient's allergies indicates:  No Known Allergies    Current Facility-Administered Medications on File Prior to Encounter   Medication    [COMPLETED] iohexol (OMNIPAQUE 350) injection 80 mL    [COMPLETED] levoFLOXacin 750 mg/150 mL IVPB 750 mg    [COMPLETED] piperacillin-tazobactam 4.5 g in dextrose 5 % 100 mL IVPB (ready to mix system)    [COMPLETED] potassium chloride SA CR tablet 60 mEq    [COMPLETED] sodium chloride 0.9% bolus 1,000 mL    [COMPLETED] vancomycin in dextrose 5 % 1 gram/250 mL IVPB 1,000 mg    [DISCONTINUED] acetaminophen tablet 650 mg    [DISCONTINUED] dextrose 50% injection 12.5 g    [DISCONTINUED] dextrose 50% injection 25 g    [DISCONTINUED] enoxaparin injection 40 mg    [DISCONTINUED] glucose chewable tablet 16 g    [DISCONTINUED] glucose chewable tablet 24 g    [DISCONTINUED] hydroCHLOROthiazide tablet 12.5 mg    [DISCONTINUED] HYDROcodone-acetaminophen  mg per tablet 1 tablet    [DISCONTINUED] HYDROcodone-acetaminophen 5-325 mg per tablet 1 tablet    [DISCONTINUED] labetalol injection 10 mg    [DISCONTINUED] lisinopril tablet 20 mg    [DISCONTINUED] ondansetron injection 4 mg    [DISCONTINUED] ondansetron injection 4 mg    [DISCONTINUED] ondansetron injection 8 mg    [DISCONTINUED] ondansetron injection 8 mg    [DISCONTINUED] pantoprazole EC tablet 40 mg    [DISCONTINUED] piperacillin-tazobactam 4.5 g in dextrose 5 % 100 mL IVPB (ready to mix system)    [DISCONTINUED] potassium, sodium phosphates 280-160-250 mg packet 1 packet    [DISCONTINUED] promethazine (PHENERGAN) 12.5 mg in dextrose 5 % 50 mL IVPB    [DISCONTINUED] promethazine (PHENERGAN) 12.5 mg in dextrose 5 % 50 mL IVPB    [DISCONTINUED] promethazine (PHENERGAN) 6.25 mg in dextrose 5 % 50 mL IVPB    [DISCONTINUED] promethazine (PHENERGAN) 6.25 mg in  dextrose 5 % 50 mL IVPB    [DISCONTINUED] sodium chloride 0.9% flush 10 mL    [DISCONTINUED] vancomycin - pharmacy to dose    [DISCONTINUED] vancomycin 1.5 g in dextrose 5 % 250 mL IVPB (ready to mix)     Current Outpatient Medications on File Prior to Encounter   Medication Sig    lisinopril-hydrochlorothiazide (PRINZIDE,ZESTORETIC) 20-12.5 mg per tablet Take 1 tablet by mouth once daily.    ondansetron (ZOFRAN) 8 MG tablet Take 8 mg by mouth every 8 (eight) hours.    pantoprazole (PROTONIX) 40 MG tablet Take 40 mg by mouth once daily.    promethazine (PHENERGAN) 25 MG suppository Place 25 mg rectally every 6 (six) hours as needed for Nausea.    sucralfate (CARAFATE) 1 gram tablet Take 1 g by mouth 4 (four) times daily.     Family History     Problem Relation (Age of Onset)    Cancer Mother, Brother        Tobacco Use    Smoking status: Never Smoker    Smokeless tobacco: Never Used   Substance and Sexual Activity    Alcohol use: Never     Frequency: Never    Drug use: Never    Sexual activity: Not on file     Review of Systems   Constitutional: Positive for activity change, appetite change, fatigue and unexpected weight change. Negative for chills, diaphoresis and fever.   HENT: Negative for congestion and trouble swallowing.    Eyes: Negative for pain.   Respiratory: Negative for cough, shortness of breath and wheezing.    Cardiovascular: Negative for chest pain, palpitations and leg swelling.   Gastrointestinal: Positive for constipation, nausea and vomiting. Negative for abdominal distention, abdominal pain, blood in stool and diarrhea.   Genitourinary: Negative for dysuria, enuresis, flank pain, frequency and hematuria.   Musculoskeletal: Positive for back pain.   Skin: Negative for color change.   Psychiatric/Behavioral: Negative for agitation.     Objective:     Vital Signs (Most Recent):  Temp: 99.3 °F (37.4 °C) (01/18/20 1715)  Pulse: 87 (01/18/20 1715)  Resp: 20 (01/18/20 1715)  BP: (!) 193/91  (01/18/20 1715)  SpO2: 96 % (01/18/20 1715) Vital Signs (24h Range):  Temp:  [96.7 °F (35.9 °C)-99.3 °F (37.4 °C)] 99.3 °F (37.4 °C)  Pulse:  [79-98] 87  Resp:  [13-20] 20  SpO2:  [95 %-97 %] 96 %  BP: (134-193)/(70-95) 193/91        There is no height or weight on file to calculate BMI.    Physical Exam   Constitutional: She is oriented to person, place, and time. She appears well-developed and well-nourished.   Patient look tired and in pain and bad acid reflux    HENT:   Head: Normocephalic and atraumatic.   Eyes: Pupils are equal, round, and reactive to light. EOM are normal.   Neck: Normal range of motion. Neck supple.   Cardiovascular: Normal rate and regular rhythm.   No murmur heard.  Pulmonary/Chest: Effort normal and breath sounds normal.   Abdominal: Soft. Bowel sounds are normal. She exhibits distension. There is no tenderness.   Old midline lower abdominal incision scar from ruptured appendix.     Musculoskeletal: Normal range of motion. She exhibits no edema or deformity.   Neurological: She is alert and oriented to person, place, and time.   Skin: Skin is warm and dry.   Psychiatric:   Depressed mood.         CRANIAL NERVES     CN III, IV, VI   Pupils are equal, round, and reactive to light.  Extraocular motions are normal.        Significant Labs:   CBC:   Recent Labs   Lab 01/17/20  1700 01/18/20  0525   WBC 19.67* 15.34*   HGB 13.7 11.6*   HCT 41.0 34.3*   * 390*     CMP:   Recent Labs   Lab 01/17/20  1700 01/18/20  0526    134*   K 3.6 3.1*   CL 94* 96   CO2 25 26   GLU 96 106   BUN 9 11   CREATININE 0.6 0.6   CALCIUM 8.8 7.9*   PROT 7.2 5.9*   ALBUMIN 2.6* 2.2*   BILITOT 1.1* 0.9   ALKPHOS 186* 170*   AST 74* 75*   ALT 98* 90*   ANIONGAP 17* 12   EGFRNONAA >60.0 >60.0     All pertinent labs within the past 24 hours have been reviewed.    Significant Imaging: I have reviewed and interpreted all pertinent imaging results/findings within the past 24 hours.

## 2020-01-19 NOTE — PROGRESS NOTES
Pharmacokinetic Initial Assessment: IV Vancomycin    Assessment/Plan:    · Patient transfer from [facility] where they were already started on a vancomycin regimen.   · They received a 1 g dose in the ED and then they were started on a maintenance dose of 1.5 g Q12h.   · Patient received 2 total doses of vancomycin at [facility].  · Will continue the regimen of 1.5g Q12h   · Desired empiric serum trough concentration is 15 to 20 mcg/mL.  · Draw vancomycin trough level 30 min prior to fourth dose on 1/19 at approximately 0930      Pharmacy will continue to follow and monitor vancomycin.      Please contact pharmacy at extension [phone] with any questions regarding this assessment.     Thank you for the consult,   Kimberly Dumont       Patient brief summary:  Radha Alcazar is a 64 y.o. female initiated on antimicrobial therapy with IV Vancomycin for treatment of suspected Pneumonia    Drug Allergies:   Review of patient's allergies indicates:  No Known Allergies    Actual Body Weight:   77.6 kg    Renal Function:   Estimated Creatinine Clearance: 105.9 mL/min (based on SCr of 0.6 mg/dL).    Dialysis Method (if applicable):  N/A    CBC (last 72 hours):  Recent Labs   Lab Result Units 01/17/20  1700 01/18/20  0525   WBC K/uL 19.67* 15.34*   Hemoglobin g/dL 13.7 11.6*   Hematocrit % 41.0 34.3*   Platelets K/uL 379* 390*   Gran% % 82.3* 81.1*   Lymph% % 8.5* 7.8*   Mono% % 7.7 9.3   Eosinophil% % 0.2 0.5   Basophil% % 0.3 0.3   Differential Method  Automated Automated       Metabolic Panel (last 72 hours):  Recent Labs   Lab Result Units 01/17/20  1700 01/17/20  2330 01/18/20  0526   Sodium mmol/L 136  --  134*   Potassium mmol/L 3.6  --  3.1*   Chloride mmol/L 94*  --  96   CO2 mmol/L 25  --  26   Glucose mg/dL 96  --  106   Glucose, UA   --  Negative  --    BUN, Bld mg/dL 9  --  11   Creatinine mg/dL 0.6  --  0.6   Albumin g/dL 2.6*  --  2.2*   Total Bilirubin mg/dL 1.1*  --  0.9   Alkaline Phosphatase U/L  186*  --  170*   AST U/L 74*  --  75*   ALT U/L 98*  --  90*   Magnesium mg/dL  --   --  1.7   Phosphorus mg/dL  --   --  2.3*       Drug levels (last 3 results):  No results for input(s): VANCOMYCINRA, VANCOMYCINPE, VANCOMYCINTR in the last 72 hours.    Microbiologic Results:  Microbiology Results (last 7 days)     Procedure Component Value Units Date/Time    Respiratory Infection Panel, Nasopharyngeal [[id number]]     Order Status:  Canceled Specimen:  Nasopharyngeal Swab     Culture, Respiratory with Gram Stain [[id number]]     Order Status:  No result Specimen:  Respiratory

## 2020-01-19 NOTE — HOSPITAL COURSE
Patient admitted for further evaluation for pancreatic mass. At presentation, patient needed NG tube placement of concerns of bowel obstruction considering her outside hospital imagings and abdominal distention. CT abdomen (1/17) revealed multiple abnormalities: Distended stomach, 1st, 2nd and 3rd portions of the duodenum suggestive of an obstructive process.  Ascites in the abdomen and pelvis and abnormal low-density foci along the capsule of the right lobe of the liver laterally and inferiorly suspicious for carcinomatosis. Abnormal diminished attenuation in the region of the body and tail of the pancreas with fluid adjacent to these areas.  Findings could represent pancreatic neoplasm and/or pancreatitis. Bilateral pleural effusions with airspace consolidation in the lower lobes bilaterally as well as in the right middle lobe.She underwent EUS with LA Grade C reflux esophagitis.A medium amount of food (residue) in the stomach. Acquired duodenal stenosis. A mass was identified in the pancreatic head. Tissue was obtained from this exam which was consisting with Adenocarcinoma. Surgical oncology and GI were following and were able to remove NG tube and place duodenal stent. She tolerated clear diet then advanced to mechanical diet. Discussed options with patient as she initially wanted to leave to [place] with her son and establish care there however, she changed her mind and opted to stay here and get a referral to Hem/Onc which was placed. She would like to rethink her hospice options.

## 2020-01-19 NOTE — PROGRESS NOTES
Pharmacokinetic Assessment Follow Up: IV Vancomycin    Vancomycin serum concentration assessment(s):  · Vancomycin trough concentration drawn ~45 min early today resulted supratherapeutic at 25.8 mcg/mL.  Anticipate true trough to remain above goal at 15-20 mcg/mL  · Scr doubled overnight, baseline ~0.6; now at 1.0  · Previously on 1500 mg IV Q12H from OSH and continued upon transfer here    Vancomycin Regimen Plan:  1.  Vancomycin 1500 mg IV Q24H  2.  Obtain trough prior to third dose of new regimen:  1/21 @ 2230  3.  Continue to monitor RF and make adjustments as needed      Drug levels (last 3 results):  Recent Labs   Lab Result Units 01/19/20  0944   Vancomycin-Trough ug/mL 25.8*       Pharmacy will continue to follow and monitor vancomycin.    Thank you for the consult,   Emma Hurley, PharmD, [facility]  [id number]       Patient brief summary:  Radha Alcazar is a 64 y.o. female initiated on antimicrobial therapy with IV Vancomycin for treatment of lower respiratory infection    Drug Allergies:   Review of patient's allergies indicates:  No Known Allergies    Actual Body Weight:   77.6 kg    Renal Function:   Estimated Creatinine Clearance: 63.5 mL/min (based on SCr of 1 mg/dL).,     CBC (last 72 hours):  Recent Labs   Lab Result Units 01/17/20  1700 01/18/20  0525 01/19/20  0408   WBC K/uL 19.67* 15.34* 14.03*   Hemoglobin g/dL 13.7 11.6* 12.1   Hematocrit % 41.0 34.3* 37.4   Platelets K/uL 379* 390* 434*   Gran% % 82.3* 81.1* 77.0*   Lymph% % 8.5* 7.8* 10.5*   Mono% % 7.7 9.3 10.8   Eosinophil% % 0.2 0.5 0.4   Basophil% % 0.3 0.3 0.4   Differential Method  Automated Automated Automated       Metabolic Panel (last 72 hours):  Recent Labs   Lab Result Units 01/17/20  1700 01/17/20  2330 01/18/20  0526 01/19/20  0408   Sodium mmol/L 136  --  134* 135*   Potassium mmol/L 3.6  --  3.1* 2.9*   Chloride mmol/L 94*  --  96 95   CO2 mmol/L 25  --  26 26   Glucose mg/dL 96  --  106 108   Glucose, UA   --  Negative  --    --    BUN, Bld mg/dL 9  --  11 12   Creatinine mg/dL 0.6  --  0.6 1.0   Albumin g/dL 2.6*  --  2.2* 2.3*   Total Bilirubin mg/dL 1.1*  --  0.9 0.7   Alkaline Phosphatase U/L 186*  --  170* 164*   AST U/L 74*  --  75* 79*   ALT U/L 98*  --  90* 90*   Magnesium mg/dL  --   --  1.7 1.8   Phosphorus mg/dL  --   --  2.3* 2.9       Vancomycin Administrations:  vancomycin given in the last 96 hours                   vancomycin 1.5 g in dextrose 5 % 250 mL IVPB (ready to mix) (mg) 1,500 mg New Bag 01/18/20 2255    vancomycin 1.5 g in dextrose 5 % 250 mL IVPB (ready to mix) (mg) 1,500 mg New Bag 01/18/20 1011    vancomycin in dextrose 5 % 1 gram/250 mL IVPB 1,000 mg (mg) 1,000 mg New Bag 01/17/20 2120                Microbiologic Results:  Microbiology Results (last 7 days)     Procedure Component Value Units Date/Time    Respiratory Infection Panel, Nasopharyngeal [[id number]]     Order Status:  Canceled Specimen:  Nasopharyngeal Swab     Culture, Respiratory with Gram Stain [[id number]]     Order Status:  No result Specimen:  Respiratory

## 2020-01-20 ENCOUNTER — TELEPHONE (OUTPATIENT)
Dept: ENDOSCOPY | Facility: HOSPITAL | Age: 65
End: 2020-01-20

## 2020-01-20 ENCOUNTER — ANESTHESIA EVENT (OUTPATIENT)
Dept: ENDOSCOPY | Facility: HOSPITAL | Age: 65
DRG: 435 | End: 2020-01-20

## 2020-01-20 ENCOUNTER — ANESTHESIA (OUTPATIENT)
Dept: ENDOSCOPY | Facility: HOSPITAL | Age: 65
DRG: 435 | End: 2020-01-20
Payer: MEDICARE

## 2020-01-20 DIAGNOSIS — K86.89 PANCREATIC MASS: Primary | ICD-10-CM

## 2020-01-20 LAB
ALBUMIN SERPL BCP-MCNC: 2.2 G/DL (ref 3.5–5.2)
ALP SERPL-CCNC: 140 U/L (ref 55–135)
ALT SERPL W/O P-5'-P-CCNC: 88 U/L (ref 10–44)
ANION GAP SERPL CALC-SCNC: 11 MMOL/L (ref 8–16)
AST SERPL-CCNC: 74 U/L (ref 10–40)
BASOPHILS # BLD AUTO: 0.08 K/UL (ref 0–0.2)
BASOPHILS NFR BLD: 0.5 % (ref 0–1.9)
BILIRUB SERPL-MCNC: 0.8 MG/DL (ref 0.1–1)
BUN SERPL-MCNC: 15 MG/DL (ref 8–23)
CALCIUM SERPL-MCNC: 8.3 MG/DL (ref 8.7–10.5)
CHLORIDE SERPL-SCNC: 97 MMOL/L (ref 95–110)
CO2 SERPL-SCNC: 29 MMOL/L (ref 23–29)
CREAT SERPL-MCNC: 1.1 MG/DL (ref 0.5–1.4)
DIFFERENTIAL METHOD: ABNORMAL
EOSINOPHIL # BLD AUTO: 0.1 K/UL (ref 0–0.5)
EOSINOPHIL NFR BLD: 0.5 % (ref 0–8)
ERYTHROCYTE [DISTWIDTH] IN BLOOD BY AUTOMATED COUNT: 13.7 % (ref 11.5–14.5)
EST. GFR  (AFRICAN AMERICAN): >60 ML/MIN/1.73 M^2
EST. GFR  (NON AFRICAN AMERICAN): 53.2 ML/MIN/1.73 M^2
GLUCOSE SERPL-MCNC: 90 MG/DL (ref 70–110)
HCT VFR BLD AUTO: 35.7 % (ref 37–48.5)
HGB BLD-MCNC: 11.5 G/DL (ref 12–16)
IMM GRANULOCYTES # BLD AUTO: 0.09 K/UL (ref 0–0.04)
IMM GRANULOCYTES NFR BLD AUTO: 0.6 % (ref 0–0.5)
LYMPHOCYTES # BLD AUTO: 1.2 K/UL (ref 1–4.8)
LYMPHOCYTES NFR BLD: 8.5 % (ref 18–48)
MAGNESIUM SERPL-MCNC: 2 MG/DL (ref 1.6–2.6)
MCH RBC QN AUTO: 31.2 PG (ref 27–31)
MCHC RBC AUTO-ENTMCNC: 32.2 G/DL (ref 32–36)
MCV RBC AUTO: 97 FL (ref 82–98)
MONOCYTES # BLD AUTO: 1.5 K/UL (ref 0.3–1)
MONOCYTES NFR BLD: 10 % (ref 4–15)
NEUTROPHILS # BLD AUTO: 11.6 K/UL (ref 1.8–7.7)
NEUTROPHILS NFR BLD: 79.9 % (ref 38–73)
NRBC BLD-RTO: 0 /100 WBC
PHOSPHATE SERPL-MCNC: 3.7 MG/DL (ref 2.7–4.5)
PLATELET # BLD AUTO: 376 K/UL (ref 150–350)
PMV BLD AUTO: 9.8 FL (ref 9.2–12.9)
POTASSIUM SERPL-SCNC: 4.1 MMOL/L (ref 3.5–5.1)
PROT SERPL-MCNC: 5.8 G/DL (ref 6–8.4)
RBC # BLD AUTO: 3.69 M/UL (ref 4–5.4)
SODIUM SERPL-SCNC: 137 MMOL/L (ref 136–145)
WBC # BLD AUTO: 14.55 K/UL (ref 3.9–12.7)

## 2020-01-20 PROCEDURE — 99232 PR SUBSEQUENT HOSPITAL CARE,LEVL II: ICD-10-PCS | Mod: ,,, | Performed by: HOSPITALIST

## 2020-01-20 PROCEDURE — 63600175 PHARM REV CODE 636 W HCPCS: Performed by: HOSPITALIST

## 2020-01-20 PROCEDURE — 88172 CYTP DX EVAL FNA 1ST EA SITE: CPT | Performed by: PATHOLOGY

## 2020-01-20 PROCEDURE — 88177 CYTP FNA EVAL EA ADDL: CPT | Performed by: PATHOLOGY

## 2020-01-20 PROCEDURE — D9220A PRA ANESTHESIA: Mod: ANES,,, | Performed by: ANESTHESIOLOGY

## 2020-01-20 PROCEDURE — D9220A PRA ANESTHESIA: Mod: CRNA,,, | Performed by: NURSE ANESTHETIST, CERTIFIED REGISTERED

## 2020-01-20 PROCEDURE — 43259 EGD US EXAM DUODENUM/JEJUNUM: CPT | Mod: ,,, | Performed by: INTERNAL MEDICINE

## 2020-01-20 PROCEDURE — 43259 PR ENDOSCOPIC ULTRASOUND EXAM: ICD-10-PCS | Mod: ,,, | Performed by: INTERNAL MEDICINE

## 2020-01-20 PROCEDURE — C9113 INJ PANTOPRAZOLE SODIUM, VIA: HCPCS | Performed by: STUDENT IN AN ORGANIZED HEALTH CARE EDUCATION/TRAINING PROGRAM

## 2020-01-20 PROCEDURE — D9220A PRA ANESTHESIA: ICD-10-PCS | Mod: ANES,,, | Performed by: ANESTHESIOLOGY

## 2020-01-20 PROCEDURE — 88173 CYTOPATH EVAL FNA REPORT: CPT | Mod: 26,,, | Performed by: PATHOLOGY

## 2020-01-20 PROCEDURE — 83735 ASSAY OF MAGNESIUM: CPT

## 2020-01-20 PROCEDURE — 88173 PR  INTERPRETATION OF FNA SMEAR: ICD-10-PCS | Mod: 26,,, | Performed by: PATHOLOGY

## 2020-01-20 PROCEDURE — 27202059 HC NEEDLE, FNA (ANY): Performed by: INTERNAL MEDICINE

## 2020-01-20 PROCEDURE — 63600175 PHARM REV CODE 636 W HCPCS: Performed by: STUDENT IN AN ORGANIZED HEALTH CARE EDUCATION/TRAINING PROGRAM

## 2020-01-20 PROCEDURE — 43752 NASAL/OROGASTRIC W/TUBE PLMT: CPT

## 2020-01-20 PROCEDURE — 25000003 PHARM REV CODE 250: Performed by: STUDENT IN AN ORGANIZED HEALTH CARE EDUCATION/TRAINING PROGRAM

## 2020-01-20 PROCEDURE — 37000008 HC ANESTHESIA 1ST 15 MINUTES: Performed by: INTERNAL MEDICINE

## 2020-01-20 PROCEDURE — 99232 SBSQ HOSP IP/OBS MODERATE 35: CPT | Mod: ,,, | Performed by: HOSPITALIST

## 2020-01-20 PROCEDURE — 88173 CYTOPATH EVAL FNA REPORT: CPT | Performed by: PATHOLOGY

## 2020-01-20 PROCEDURE — 85025 COMPLETE CBC W/AUTO DIFF WBC: CPT

## 2020-01-20 PROCEDURE — 36415 COLL VENOUS BLD VENIPUNCTURE: CPT

## 2020-01-20 PROCEDURE — 80053 COMPREHEN METABOLIC PANEL: CPT

## 2020-01-20 PROCEDURE — 88305 TISSUE EXAM BY PATHOLOGIST: ICD-10-PCS | Mod: 26,,, | Performed by: PATHOLOGY

## 2020-01-20 PROCEDURE — D9220A PRA ANESTHESIA: ICD-10-PCS | Mod: CRNA,,, | Performed by: NURSE ANESTHETIST, CERTIFIED REGISTERED

## 2020-01-20 PROCEDURE — 63600175 PHARM REV CODE 636 W HCPCS: Performed by: NURSE ANESTHETIST, CERTIFIED REGISTERED

## 2020-01-20 PROCEDURE — 37000009 HC ANESTHESIA EA ADD 15 MINS: Performed by: INTERNAL MEDICINE

## 2020-01-20 PROCEDURE — 25000003 PHARM REV CODE 250: Performed by: HOSPITALIST

## 2020-01-20 PROCEDURE — 20600001 HC STEP DOWN PRIVATE ROOM

## 2020-01-20 PROCEDURE — 84100 ASSAY OF PHOSPHORUS: CPT

## 2020-01-20 PROCEDURE — 88305 TISSUE EXAM BY PATHOLOGIST: CPT | Mod: 26,,, | Performed by: PATHOLOGY

## 2020-01-20 PROCEDURE — 88305 TISSUE EXAM BY PATHOLOGIST: CPT | Performed by: PATHOLOGY

## 2020-01-20 PROCEDURE — 25000003 PHARM REV CODE 250: Performed by: NURSE ANESTHETIST, CERTIFIED REGISTERED

## 2020-01-20 PROCEDURE — 43259 EGD US EXAM DUODENUM/JEJUNUM: CPT | Performed by: INTERNAL MEDICINE

## 2020-01-20 PROCEDURE — 88172 PR  EVALUATION OF FNA SMEAR TO DETERMINE ADEQUACY, FIRST EVAL: ICD-10-PCS | Mod: 26,,, | Performed by: PATHOLOGY

## 2020-01-20 PROCEDURE — 88172 CYTP DX EVAL FNA 1ST EA SITE: CPT | Mod: 26,,, | Performed by: PATHOLOGY

## 2020-01-20 RX ORDER — SODIUM CHLORIDE 9 MG/ML
INJECTION, SOLUTION INTRAVENOUS CONTINUOUS
Status: CANCELLED | OUTPATIENT
Start: 2020-01-20

## 2020-01-20 RX ORDER — PHENYLEPHRINE HYDROCHLORIDE 10 MG/ML
INJECTION INTRAVENOUS
Status: DISCONTINUED | OUTPATIENT
Start: 2020-01-20 | End: 2020-01-20

## 2020-01-20 RX ORDER — SODIUM CHLORIDE 0.9 % (FLUSH) 0.9 %
3 SYRINGE (ML) INJECTION
Status: CANCELLED | OUTPATIENT
Start: 2020-01-20

## 2020-01-20 RX ORDER — LORAZEPAM 2 MG/ML
0.25 INJECTION INTRAMUSCULAR ONCE AS NEEDED
Status: CANCELLED | OUTPATIENT
Start: 2020-01-20 | End: 2031-06-18

## 2020-01-20 RX ORDER — LIDOCAINE HCL/PF 100 MG/5ML
SYRINGE (ML) INTRAVENOUS
Status: DISCONTINUED | OUTPATIENT
Start: 2020-01-20 | End: 2020-01-20

## 2020-01-20 RX ORDER — ROCURONIUM BROMIDE 10 MG/ML
INJECTION, SOLUTION INTRAVENOUS
Status: DISCONTINUED | OUTPATIENT
Start: 2020-01-20 | End: 2020-01-20

## 2020-01-20 RX ORDER — SUCCINYLCHOLINE CHLORIDE 20 MG/ML
INJECTION INTRAMUSCULAR; INTRAVENOUS
Status: DISCONTINUED | OUTPATIENT
Start: 2020-01-20 | End: 2020-01-20

## 2020-01-20 RX ORDER — PROPOFOL 10 MG/ML
VIAL (ML) INTRAVENOUS
Status: DISCONTINUED | OUTPATIENT
Start: 2020-01-20 | End: 2020-01-20

## 2020-01-20 RX ORDER — ONDANSETRON 2 MG/ML
INJECTION INTRAMUSCULAR; INTRAVENOUS
Status: DISCONTINUED | OUTPATIENT
Start: 2020-01-20 | End: 2020-01-20

## 2020-01-20 RX ORDER — METOCLOPRAMIDE HYDROCHLORIDE 5 MG/ML
10 INJECTION INTRAMUSCULAR; INTRAVENOUS EVERY 10 MIN PRN
Status: CANCELLED | OUTPATIENT
Start: 2020-01-20

## 2020-01-20 RX ORDER — HYDROMORPHONE HYDROCHLORIDE 1 MG/ML
0.2 INJECTION, SOLUTION INTRAMUSCULAR; INTRAVENOUS; SUBCUTANEOUS EVERY 5 MIN PRN
Status: CANCELLED | OUTPATIENT
Start: 2020-01-20

## 2020-01-20 RX ADMIN — MORPHINE SULFATE 4 MG: 2 INJECTION, SOLUTION INTRAMUSCULAR; INTRAVENOUS at 05:01

## 2020-01-20 RX ADMIN — LIDOCAINE HYDROCHLORIDE 80 MG: 20 INJECTION, SOLUTION INTRAVENOUS at 10:01

## 2020-01-20 RX ADMIN — HYDROCODONE BITARTRATE AND ACETAMINOPHEN 1 TABLET: 5; 325 TABLET ORAL at 10:01

## 2020-01-20 RX ADMIN — LISINOPRIL 20 MG: 20 TABLET ORAL at 12:01

## 2020-01-20 RX ADMIN — PROPOFOL 160 MG: 10 INJECTION, EMULSION INTRAVENOUS at 10:01

## 2020-01-20 RX ADMIN — HYDROCHLOROTHIAZIDE 12.5 MG: 12.5 TABLET ORAL at 12:01

## 2020-01-20 RX ADMIN — MORPHINE SULFATE 4 MG: 2 INJECTION, SOLUTION INTRAMUSCULAR; INTRAVENOUS at 01:01

## 2020-01-20 RX ADMIN — PIPERACILLIN SODIUM,TAZOBACTAM SODIUM 4.5 G: 4; .5 INJECTION, POWDER, FOR SOLUTION INTRAVENOUS at 08:01

## 2020-01-20 RX ADMIN — SUCCINYLCHOLINE CHLORIDE 140 MG: 20 INJECTION, SOLUTION INTRAMUSCULAR; INTRAVENOUS at 10:01

## 2020-01-20 RX ADMIN — SODIUM CHLORIDE, SODIUM LACTATE, POTASSIUM CHLORIDE, AND CALCIUM CHLORIDE: 600; 310; 30; 20 INJECTION, SOLUTION INTRAVENOUS at 12:01

## 2020-01-20 RX ADMIN — ONDANSETRON 4 MG: 2 INJECTION INTRAMUSCULAR; INTRAVENOUS at 10:01

## 2020-01-20 RX ADMIN — PIPERACILLIN SODIUM,TAZOBACTAM SODIUM 4.5 G: 4; .5 INJECTION, POWDER, FOR SOLUTION INTRAVENOUS at 12:01

## 2020-01-20 RX ADMIN — PANTOPRAZOLE SODIUM 40 MG: 40 INJECTION, POWDER, FOR SOLUTION INTRAVENOUS at 12:01

## 2020-01-20 RX ADMIN — PHENYLEPHRINE HYDROCHLORIDE 100 MCG: 10 INJECTION INTRAVENOUS at 10:01

## 2020-01-20 RX ADMIN — VANCOMYCIN HYDROCHLORIDE 1500 MG: 1.5 INJECTION, POWDER, LYOPHILIZED, FOR SOLUTION INTRAVENOUS at 12:01

## 2020-01-20 RX ADMIN — ROCURONIUM BROMIDE 5 MG: 10 INJECTION, SOLUTION INTRAVENOUS at 10:01

## 2020-01-20 NOTE — TRANSFER OF CARE
"Anesthesia Transfer of Care Note    Patient: Radha Alcazar    Procedure(s) Performed: Procedure(s) (LRB):  ULTRASOUND, UPPER GI TRACT, ENDOSCOPIC (N/A)    Patient location: PACU    Anesthesia Type: general    Transport from OR: Transported from OR on 6-10 L/min O2 by face mask with adequate spontaneous ventilation    Post pain: adequate analgesia    Post assessment: no apparent anesthetic complications and tolerated procedure well    Post vital signs: stable    Level of consciousness: awake and responds to stimulation    Nausea/Vomiting: no nausea/vomiting    Complications: none    Transfer of care protocol was followed      Last vitals:   Visit Vitals  BP (!) 167/78   Pulse 80   Temp 37 °C (98.6 °F) (Skin)   Resp 16   Ht 5' 11" (1.803 m)   Wt 77.6 kg (171 lb)   LMP  (LMP Unknown)   SpO2 96%   Breastfeeding? No   BMI 23.85 kg/m²     "

## 2020-01-20 NOTE — PLAN OF CARE
POC reviewed, pt verbalized understanding. VSS on RA. NO PAIN EXPRESSED, JUST DISCOMFORT. Voids per toilet, adequate UOP overnight. NGT to LIWS. Pt NPO w/ no complaints of N/V. Pt slept between care. Frequent rounds made for safety, call light in reach. WCTM

## 2020-01-20 NOTE — ASSESSMENT & PLAN NOTE
CT/Xray showed Bilateral lung consolidation, patient afebrile, denied cough, SOB or chest pain.   + leukocytosis   Vanc and zosyn initiated outside the facility      Ddx: Most likely pneumonia, [place]?   Leukocytosis improved after starting Abx     Plan:   - de-esclate Abx to zosyn   - Follow RIP, Respiratory CX, Blood Cx

## 2020-01-20 NOTE — ASSESSMENT & PLAN NOTE
Patient had Nausea since the thanksgiving, vomiting started 2-weeks ago, symptoms associated with constipation, and wt loss. Patient looks euvolemic   Abdomen soft and positive BS   US abdomen: Distended fluid-filled stomach in the left upper quadrant.  Most likely related to pancreatic mass/ascites     Plan:   - Replete Electrolytes   - Volume resuscitation as needed   - NPO   - Added Zofran and phenergan PRN (Qtc 455)  - NGT placed with 2200 cc tf98rin  - Monitor the pa tient for any sign of surgical abdomen.

## 2020-01-20 NOTE — PROGRESS NOTES
Therapy with vancomycin complete and consult discontinued by provider.  Pharmacy will sign off, please re-consult as needed.

## 2020-01-20 NOTE — [employer] PATIENT INSTRUCTIONS
Discharge Summary/Instructions after an Endoscopic Procedure  Patient Name: Radha Alcazar  Patient MRN: [id number]  Patient YOB: 1955  Monday, January 20, 2020  Toni Messer MD  RESTRICTIONS:  During your procedure today, you received medications for sedation.  These   medications may affect your judgment, balance and coordination.  Therefore,   for 24 hours, you have the following restrictions:   - DO NOT drive a car, operate machinery, make legal/financial decisions,   sign important papers or drink alcohol.    ACTIVITY:  Today: no heavy lifting, straining or running due to procedural   sedation/anesthesia.  The following day: return to full activity including work.  DIET:  Eat and drink normally unless instructed otherwise.     TREATMENT FOR COMMON SIDE EFFECTS:  - Mild abdominal pain, nausea, belching, bloating or excessive gas:  rest,   eat lightly and use a heating pad.  - Sore Throat: treat with throat lozenges and/or gargle with warm salt   water.  - Because air was used during the procedure, expelling large amounts of air   from your rectum or belching is normal.  - If a bowel prep was taken, you may not have a bowel movement for 1-3 days.    This is normal.  SYMPTOMS TO WATCH FOR AND REPORT TO YOUR PHYSICIAN:  1. Abdominal pain or bloating, other than gas cramps.  2. Chest pain.  3. Back pain.  4. Signs of infection such as: chills or fever occurring within 24 hours   after the procedure.  5. Rectal bleeding, which would show as bright red, maroon, or black stools.   (A tablespoon of blood from the rectum is not serious, especially if   hemorrhoids are present.)  6. Vomiting.  7. Weakness or dizziness.  GO DIRECTLY TO THE NEAREST EMERGENCY ROOM IF YOU HAVE ANY OF THE FOLLOWING:      Difficulty breathing              Chills and/or fever over 101 F   Persistent vomiting and/or vomiting blood   Severe abdominal pain   Severe chest pain   Black, tarry stools   Bleeding- more than one  tablespoon   Any other symptom or condition that you feel may need urgent attention  Your doctor recommends these additional instructions:  If any biopsies were taken, your doctors clinic will contact you in 1 to 2   weeks with any results.  - [facility] to [facility].   - NPO.   - Continue present medications.   - Await cytology results.   - Consult surgery oncology at appointment to be scheduled.  For questions, problems or results please call your physician - Toni Messer MD at Work:  [phone].  [facility], EMERGENCY ROOM PHONE NUMBER: [phone]  IF A COMPLICATION OR EMERGENCY SITUATION ARISES AND YOU ARE UNABLE TO REACH   YOUR PHYSICIAN - GO DIRECTLY TO THE EMERGENCY ROOM.  Toni Messer MD  1/20/2020 11:16:59 AM  This report has been verified and signed electronically.  [employer]

## 2020-01-20 NOTE — ASSESSMENT & PLAN NOTE
"Ms. Alcazar is a 63 yo F with PMHx of HTN and appendectomy who transferred from [facility] for further evaluation of pancreatic mass be hem/Onc and possible biopsy by EUS.    Pt stated she had decreased appetite since thanksgiving due to the feeling of "food getting stuck in chest" as well as epigastric pain that radiates to her R shoulder. Symptoms have progressively worsened with decreased ability to tolerate PO. Pt reported a 40lbs unintentional weight loss since Nov 2019. She endorses minimal intake for the last 2 wks, constipation, acid reflux and N/V. Denied chills or night sweat. Denied abdominal pain, or diarrhea.     CT abdomen with multiple abnormalities: Distended stomach, 1st, 2nd and 3rd portions of the duodenum suggestive of an obstructive process.  Ascites in the abdomen and pelvis and abnormal low-density foci along the capsule of the right lobe of the liver laterally and inferiorly suspicious for carcinomatosis. Abnormal diminished attenuation in the region of the body and tail of the pancreas with fluid adjacent to these areas.  Findings could represent pancreatic neoplasm and/or pancreatitis. Bilateral pleural effusions with airspace consolidation in the lower lobes bilaterally as well as in the right middle lobe.    Giving the presentation and CT finding, concerning for cancer process   AES 1/20: - LA Grade C reflux esophagitis. A medium amount of food (residue) in the stomach. Acquired duodenal stenosis. A mass was identified in the pancreatic head. Tissue was obtained from this exam, and results are pending. However, the endosonographic appearance is consistent with adenocarcinoma.   - Oncology/ surgical oncology consulted, appreciate recs         "

## 2020-01-20 NOTE — ASSESSMENT & PLAN NOTE
. AST 75 and ALT 90  Normal bili   CT showed: A 1 cm low-density abnormality is noted in the anterior segment of the right lobe of the liver as seen on axial image 24 and series 2.    Ddx: liver [place]?  Plan:   - Consult Oncology, appreciate recs

## 2020-01-20 NOTE — H&P
Short Stay Endoscopy History and Physical    PCP - Hossein Mortensen MD  Referring Physician - Diana Turcios MD  [address] [place] [address]    Procedure - eus  ASA - per anesthesia  Mallampati - per anesthesia  History of Anesthesia problems - no  Family history Anesthesia problems -  no   Plan of anesthesia - General    HPI:  This is a 64 y.o. female here for evaluation of: panc mass    Reflux - no  Dysphagia - no  Abdominal pain - no  Diarrhea - no    ROS:  Constitutional: No fevers, chills, No weight loss  CV: No chest pain  Pulm: No cough, No shortness of breath  Ophtho: No vision changes  GI: see HPI  Derm: No rash    Medical History:  has a past medical history of Hypertension.    Surgical History:  has a past surgical history that includes Appendectomy.    Family History: family history includes Cancer in her brother and mother..    Social History:  reports that she has never smoked. She has never used smokeless tobacco. She reports that she does not drink alcohol or use drugs.    Review of patient's allergies indicates:  No Known Allergies    Medications:   Medications Prior to Admission   Medication Sig Dispense Refill Last Dose    aspirin-acetaminophen-caffeine 250-250-65 mg (EXCEDRIN EXTRA STRENGTH) 250-250-65 mg per tablet Take 2 tablets by mouth daily as needed for Pain.       lisinopril-hydrochlorothiazide (PRINZIDE,ZESTORETIC) 20-12.5 mg per tablet Take 1 tablet by mouth once daily.   Past Month    ondansetron (ZOFRAN) 8 MG tablet Take 8 mg by mouth every 8 (eight) hours.   Past Month    pantoprazole (PROTONIX) 40 MG tablet Take 40 mg by mouth once daily.   Past Month    promethazine (PHENERGAN) 25 MG suppository Place 25 mg rectally every 6 (six) hours as needed for Nausea.   1/17/2020    sucralfate (CARAFATE) 1 gram tablet Take 1 g by mouth 4 (four) times daily.   Past Month       Physical Exam:    Vital Signs:   Vitals:    01/20/20 1004   BP: (!) 167/78   Pulse: 80   Resp: 16    Temp: 98.6 °F (37 °C)       General Appearance: Well appearing in no acute distress    Labs:  Lab Results   Component Value Date    WBC 14.55 (H) 01/20/2020    HGB 11.5 (L) 01/20/2020    HCT 35.7 (L) 01/20/2020     (H) 01/20/2020    ALT 88 (H) 01/20/2020    AST 74 (H) 01/20/2020     01/20/2020    K 4.1 01/20/2020    CL 97 01/20/2020    CREATININE 1.1 01/20/2020    BUN 15 01/20/2020    CO2 29 01/20/2020    INR 1.5 (H) 01/18/2020       I have explained the risks and benefits of this endoscopic procedure to the patient including but not limited to bleeding, inflammation, infection, perforation, and death.      Toni Messer MD

## 2020-01-20 NOTE — DISCHARGE INSTRUCTIONS
Endoscopic Ultrasound (EUS)    An endoscopic ultrasound (EUS) is a test to look at the inside of your gastrointestinal (GI) tract. It's commonly used to look for cancers or growths in the esophagus, stomach, pancreas, liver, and rectum. It can help to stage cancer (see how advanced a cancer is). EUS may also be used to help diagnose certain diseases or to drain cysts or abscesses.  What is EUS?  EUS shows both ultrasound images and live video of the GI tract. During the test, a flexible tube called an endoscope (scope) is used. At the end of the scope is a tiny video camera and light. The video camera sends live images to a monitor. The scope also contains a very small ultrasound device. This uses sound waves to create images and send them to a monitor.  A needle is passed through the scope. The needle can be used take a small sample of tissue for testing. This is called a biopsy. The needle can be used to take a sample of fluid. This is called fine-needle aspiration (FNA).  Risks and possible complications of EUS  Risks and possible complications include the following:  · Bleeding  · Infection  · A perforation (hole) in the digestive tract   · Risks of sedation or anesthesia   Before the test  Be prepared prior to the test:  · Tell your healthcare provider what medicine you take. This includes vitamins, herbs, and over-the-counter medicine. It also includes any blood thinners, such as warfarin, clopidogrel, ibuprofen, or daily aspirin. Ask your healthcare provider if you need to stop taking some or all of them before the test.  · You may be prescribed antibiotics to take before or after the test. This depends on the area being studied and what is done during the test. These medicines help prevent infection.  · Carefully follow the instructions for preparing for the test to make sure results are accurate. Instructions may include:  ¨ If youre having an EUS of the upper GI tract (esophagus, stomach, duodenum,  pancreas, liver):  § Do not eat or drink for 6 hours before the test.  ¨ If youre having an EUS of the lower GI tract (rectum):  § Before the test, do bowel prep as instructed to clean your rectum of stool. This may involve a clear liquid diet and using a laxative (liquid or pills) the night before the test. Or it may mean doing one or more enemas the morning of the test.  § Do not eat or drink for 6 hours before the test.  · Be sure to arrive on time at the facility. Bring your identification and health insurance card. Leave valuables at home. If you have them, bring X-rays or other test results with you.  Let the healthcare provider know  For your safety, tell the healthcare provider if you:  · Take insulin. Your dose may need to be changed on the day of your test.  · Are allergic to latex.  · Have any other allergies.  · Are taking blood thinners.   During the test  An endoscopic ultrasound usually takes place in a hospital. The procedure itself may take 1 to 2 hours. You will likely go home soon afterward. During the test:  · You lie on your left side on an exam table.  · An intravenous (IV) line will be put into a vein in your arm or hand. This line supplies fluids and medicines. To keep you comfortable during the test, you will be given a sedative medicine. This medicine prevents discomfort and will make you sleepy.  · If you are having an EUS of the upper GI tract, local anesthetic may be sprayed in your throat. This will help you be more comfortable as the healthcare provider inserts the scope. The healthcare provider then gently puts the flexible scope into your mouth or nose and down your throat.  · If youre having an EUS of the lower GI tract, the healthcare provider gently puts the flexible scope into your anus.  · During the test, the scope sends live video and ultrasound images from inside your body to nearby monitors. These are used to examine your GI tract. Specialized procedures, such as drainage,  are done as needed.  · The healthcare provider may discuss the results with you soon after the test. Biopsy results take several  days.  · In most cases, you can go home within a few hours of the test. When you leave the facility, have an adult family member or friend drive you, even if you don't feel that sleepy.  After the test  Here is what to expect after the test:  · You may feel tired from the sedative. This should wear off by the end of the day.  · If you had an upper digestive endoscopy, your throat may feel sore for a day or two. Over-the-counter sore throat lozenges and spray should help.  · You can eat and drink normally as soon as the test is done.  When to call the healthcare provider  Call your healthcare provider if you notice any of the following:  · Fever of 100.4°F (38.0°C) or higher, or as advised by your healthcare provider  · Shortness of breath  · Vomiting blood, blood in stool, or black stools  · Coughing or hoarse voice that wont go away   Date Last Reviewed: 7/1/2016  © 6949-0420 [employer]. [address] [place] [address]. All rights reserved. This information is not intended as a substitute for professional medical care. Always follow your healthcare professional's instructions.

## 2020-01-20 NOTE — TREATMENT PLAN
Treatment Plan  01/20/2020  11:46 AM    EUS completed with impression and recs below.    Impression:             - LA Grade C reflux esophagitis.  - A medium amount of food (residue) in the stomach.  - Acquired duodenal stenosis.  - A mass was identified in the pancreatic head. Tissue was obtained from this exam, and results are   pending. However, the endosonographic appearance is consistent with adenocarcinoma. This was staged T3 N0 Mx by endosonographic criteria. The staging applies if malignancy is confirmed.    Recommendation:         - [facility] to [facility]  - NPO with NGT in place  - Continue present medications  - Await cytology results.  - Consult surgery oncology (consult order placed)  - We will continue to follow along side the primary team, please call with any additional questions or concerns    Martin Eddy M.D.  Gastroenterology Fellow, PGY-VI  Pager: [phone]  [facility]

## 2020-01-20 NOTE — PROGRESS NOTES
"[facility] Hospital Medicine  Progress Note    Patient Name: Radha Alcazar  MRN: [id number]  Patient Class: IP- Inpatient   Admission Date: 1/18/2020  Length of Stay: 2 days  Attending Physician: Diana Turcios MD  Primary Care Provider: Hossein Mortensen MD    [facility] Medicine Team: [facility] HOSP MED 3 Zena Bolden MD    Subjective:     Principal Problem:Pancreatic mass        HPI:  Ms. Alcazar is a 63 yo F with PMHx of HTN and appendectomy who transferred from [facility] for further evaluation of pancreatic mass be hem/Onc and possible biopsy by EUS.     Initially patient hospitalized in [facility] ([facility]) for persistent vomiting and R shoulder pain. CT scan performed at [facility] and was told she had gallbladder stones and a mass on pancreas and was referred to cancer center. Then she presented to [facility] for same problem. Further evaluation done and CT multiple abnormalities.     Pt stated she had decreased appetite since thanksgiving due to the feeling of "food getting stuck in chest" as well as epigastric pain that radiates to her R shoulder. Symptoms have progressively worsened with decreased ability to tolerate PO. Pt reported a 40lbs unintentional weight loss since Nov 2019. She endorses minimal intake for the last 2 wks, constipation, acid reflux and N/V. Denied chills or night sweat. Denied abdominal pain, or diarrhea. Denied SOB, chest pain, cough or palpitation.   Patient never smoke. She don't drink alcohol. NKDA. Family Hx of breast cancer and colon cancer in her mother. She lives in her house with her granddaughter who has Autism.         Overview/Hospital Course:  Patient admitted for further evaluation for pancreatic mass.   CT abdomen (1/17) revealed multiple abnormalities: Distended stomach, 1st, 2nd and 3rd portions of the duodenum suggestive of an obstructive process.  Ascites in the abdomen and pelvis and abnormal low-density foci along the capsule of the right " lobe of the liver laterally and inferiorly suspicious for carcinomatosis. Abnormal diminished attenuation in the region of the body and tail of the pancreas with fluid adjacent to these areas.  Findings could represent pancreatic neoplasm and/or pancreatitis. Bilateral pleural effusions with airspace consolidation in the lower lobes bilaterally as well as in the right middle lobe.     Patient didn't had BM for couple of days. Concern about bowel obstruction overnight, Xray negative for obstruction. Patient on clear liquid diet and NPO after midnight for biopsy by AES 1/20. NGT placed 1/19 for decompression, drain 2220 cc over 24hrs     Mass biopsy done by AES 1/20: - LA Grade C reflux esophagitis.A medium amount of food (residue) in the stomach. Acquired duodenal stenosis. A mass was identified in the pancreatic head. Tissue was obtained from this exam, and results are pending. However, the endosonographic appearance is consistent with adenocarcinoma. Surgical oncology consulted, appreciated recs     Interval History: Patient today feels better comparing to last night, she was active, smiling and answering questions. NGT placed 1/19 for decompression, patient had BM. AES for biopsy 1/20.     Review of Systems   Constitutional: Positive for activity change, appetite change, fatigue and unexpected weight change. Negative for chills, diaphoresis and fever.   HENT: Negative for congestion and trouble swallowing.    Eyes: Negative for pain.   Respiratory: Negative for cough, shortness of breath and wheezing.    Cardiovascular: Negative for chest pain, palpitations and leg swelling.   Gastrointestinal: Positive for constipation, nausea and vomiting. Negative for abdominal distention, abdominal pain, blood in stool and diarrhea.   Genitourinary: Negative for dysuria, enuresis, flank pain, frequency and hematuria.   Musculoskeletal: Positive for back pain.   Skin: Negative for color change.   Psychiatric/Behavioral:  Negative for agitation.     Objective:     Vital Signs (Most Recent):  Temp: 96.7 °F (35.9 °C) (01/20/20 0757)  Pulse: 91 (01/20/20 0757)  Resp: 20 (01/20/20 0757)  BP: (!) 165/79 (01/20/20 0757)  SpO2: (!) 93 % (01/20/20 0757) Vital Signs (24h Range):  Temp:  [96.6 °F (35.9 °C)-99.3 °F (37.4 °C)] 96.7 °F (35.9 °C)  Pulse:  [79-91] 91  Resp:  [16-20] 20  SpO2:  [93 %-97 %] 93 %  BP: (142-165)/(68-79) 165/79     Weight: 77.6 kg (171 lb)  Body mass index is 23.85 kg/m².    Intake/Output Summary (Last 24 hours) at 1/20/2020 0923  Last data filed at 1/20/2020 0338  Gross per 24 hour   Intake 1366.25 ml   Output 2950 ml   Net -1583.75 ml      Physical Exam   Constitutional: She is oriented to person, place, and time. She appears well-developed and well-nourished.   Patient look tired and in pain and bad acid reflux    HENT:   Head: Normocephalic and atraumatic.   Eyes: Pupils are equal, round, and reactive to light. EOM are normal.   Neck: Normal range of motion. Neck supple.   Cardiovascular: Normal rate and regular rhythm.   No murmur heard.  Pulmonary/Chest: Effort normal and breath sounds normal.   Abdominal: Soft. Bowel sounds are normal. She exhibits distension. There is no tenderness.   Old midline lower abdominal incision scar from ruptured appendix.     Musculoskeletal: Normal range of motion. She exhibits no edema or deformity.   Neurological: She is alert and oriented to person, place, and time.   Skin: Skin is warm and dry.   Psychiatric:   Depressed mood.       Significant Labs: All pertinent labs within the past 24 hours have been reviewed.    Significant Imaging: I have reviewed and interpreted all pertinent imaging results/findings within the past 24 hours.      Assessment/Plan:      * Pancreatic mass  Ms. Alcazar is a 65 yo F with PMHx of HTN and appendectomy who transferred from [facility] for further evaluation of pancreatic mass be hem/Onc and possible biopsy by EUS.    Pt stated  "she had decreased appetite since thanksgiving due to the feeling of "food getting stuck in chest" as well as epigastric pain that radiates to her R shoulder. Symptoms have progressively worsened with decreased ability to tolerate PO. Pt reported a 40lbs unintentional weight loss since Nov 2019. She endorses minimal intake for the last 2 wks, constipation, acid reflux and N/V. Denied chills or night sweat. Denied abdominal pain, or diarrhea.     CT abdomen with multiple abnormalities: Distended stomach, 1st, 2nd and 3rd portions of the duodenum suggestive of an obstructive process.  Ascites in the abdomen and pelvis and abnormal low-density foci along the capsule of the right lobe of the liver laterally and inferiorly suspicious for carcinomatosis. Abnormal diminished attenuation in the region of the body and tail of the pancreas with fluid adjacent to these areas.  Findings could represent pancreatic neoplasm and/or pancreatitis. Bilateral pleural effusions with airspace consolidation in the lower lobes bilaterally as well as in the right middle lobe.    Giving the presentation and CT finding, concerning for cancer process   AES 1/20: - LA Grade C reflux esophagitis. A medium amount of food (residue) in the stomach. Acquired duodenal stenosis. A mass was identified in the pancreatic head. Tissue was obtained from this exam, and results are pending. However, the endosonographic appearance is consistent with adenocarcinoma.   - Oncology/ surgical oncology consulted, appreciate recs           Depressed mood  Patient in very depressed mood due to her medical condition. She feels depressed, lost of intrest. No planning of harming herself or others. Difficulty sleeping related to the Acid reflux. She doesn't feels guilty or had dec of concentration.     Plan:   - Will consider starting SSRI once later once definitive diagnoses is establish and no improvement in her condition.       Normocytic anemia  Hb on admission " 11.6  Iron WNL. Transferrin and TIBC decrease   Workup showed most likely to be Anemia of chronic disease     Plan:   - Daily CBC         Leukocytosis        Transaminitis    . AST 75 and ALT 90  Normal bili   CT showed: A 1 cm low-density abnormality is noted in the anterior segment of the right lobe of the liver as seen on axial image 24 and series 2.    Ddx: liver Yuliya?  Plan:   - Consult Oncology, appreciate recs           Hypokalemia  - Replete as needed       Abnormal CT of the abdomen  See pancreatic mass       Pleural effusion  CT showed moderate bilateral pleural effusions. Patient Asymptomatic.   May consider thoracentesis and fluid analysis for further diagnosis       Lung consolidation  CT/Xray showed Bilateral lung consolidation, patient afebrile, denied cough, SOB or chest pain.   + leukocytosis   Vanc and zosyn initiated outside the facility      Ddx: Most likely pneumonia, Yuliya?   Leukocytosis improved after starting Abx     Plan:   - de-esclate Abx to zosyn   - Follow RIP, Respiratory CX, Blood Cx     Intractable nausea and vomiting  Patient had Nausea since the thanksgiving, vomiting started 2-weeks ago, symptoms associated with constipation, and wt loss. Patient looks euvolemic   Abdomen soft and positive BS   US abdomen: Distended fluid-filled stomach in the left upper quadrant.  Most likely related to pancreatic mass/ascites     Plan:   - Replete Electrolytes   - Volume resuscitation as needed   - NPO   - Added Zofran and phenergan PRN (Qtc 455)  - NGT placed with 2200 cc [id number]  - Monitor the pa tient for any sign of surgical abdomen.           Hypertension  BP elevated since the admission, most likely from the pain and anxiety     Plan:   - Resume home Lisinopril and hydrochlorothiazide   - Monitor and adjust meds as needed         VTE Risk Mitigation (From admission, onward)         Ordered     Place sequential compression device  Until discontinued      01/18/20 1805     IP VTE LOW  RISK PATIENT  Once      01/18/20 2945                      Zena Bolden MD  Department of Hospital Medicine   [facility]

## 2020-01-20 NOTE — ANESTHESIA PREPROCEDURE EVALUATION
01/20/2020  Radha Alcazar is a 64 y.o., female.    Anesthesia Evaluation    I have reviewed the Patient Summary Reports.    I have reviewed the Nursing Notes.   I have reviewed the Medications.     Review of Systems  Anesthesia Hx:  No problems with previous Anesthesia  History of prior surgery of interest to airway management or planning: Previous anesthesia: General  Denies Personal Hx of Anesthesia complications.   Cardiovascular:   Hypertension    Pulmonary:  Pulmonary Normal    Renal/:  Renal/ Normal     Hepatic/GI:   Pancreatic mass   Neurological:  Neurology Normal    Endocrine:  Endocrine Normal      Patient Active Problem List   Diagnosis    Abdominal pain    Hypertension    Intractable nausea and vomiting    Pancreatic mass    Lung consolidation    Pleural effusion    Abnormal CT of the abdomen    Hypokalemia    Transaminitis    Leukocytosis    Normocytic anemia    Depressed mood     Past Medical History:   Diagnosis Date    Hypertension      Past Surgical History:   Procedure Laterality Date    APPENDECTOMY           Physical Exam  General:  Well nourished Ng tube in situ    Airway/Jaw/Neck:  Airway Findings: Mouth Opening: Normal Tongue: Normal  General Airway Assessment: Adult  Mallampati: II  TM Distance: Normal, at least 6 cm  Jaw/Neck Findings:  Neck ROM: Normal ROM      Dental:  Dental Findings: In tact   Chest/Lungs:  Chest/Lungs Findings: Clear to auscultation     Heart/Vascular:  Heart Findings: Rate: Normal  Rhythm: Regular Rhythm  Sounds: Normal        Mental Status:  Mental Status Findings:  Cooperative, Alert and Oriented         Anesthesia Plan  Type of Anesthesia, risks & benefits discussed:  Anesthesia Type:  general  Patient's Preference:   Intra-op Monitoring Plan: standard ASA monitors  Intra-op Monitoring Plan Comments:   Post Op Pain Control Plan: per  primary service following discharge from PACU  Post Op Pain Control Plan Comments:   Induction:   IV  Beta Blocker:  Patient is not currently on a Beta-Blocker (No further documentation required).       Informed Consent: Patient understands risks and agrees with Anesthesia plan.  Questions answered. Anesthesia consent signed with patient.  ASA Score: 2     Day of Surgery Review of History & Physical:    H&P update referred to the surgeon.         Ready For Surgery From Anesthesia Perspective.

## 2020-01-20 NOTE — PLAN OF CARE
POC reviewed with patient and family. AAOX4. SBP remained under 170. Skin intact. LR infusing at 125ml/hr. IV Zosyn given. No complaints of abdominal pain, only throat pain R/T NG tube. Pain controlled with prn morphine and chloraseptic spray. No complaints of nausea. NG tube to R. Nare in place to LIWS with 300ml of green output. Patient did go to endoscopy for pancreatic mass biopsy. Nurse did notify patient and family of chest CT that will be done tomorrow. Patient is a stand by assist, voided per toilet. NPO diet maintained. SCD's refused per patient. Call light remained in reach. Frequent monitoring completed.

## 2020-01-20 NOTE — PLAN OF CARE
POC reviewed with patient and family. AAOX4. SBP remained under 170. Skin intact. New PIV placed to R. FA. IV potassium given for a level of 2.9. LR at 75ml/hr infusing. IV antibiotics given. Complaints of abdominal pain radiating to R. Shoulder, controlled with prn morphine. 1 episode of nausea occurring resolved with phenergan. Suppository given, 1 small BM occurred. NG tube to R. Nare placed with 1650ml output. Placement verified by xray. Patient is a stand by assist, voided per toilet. NPO diet maintained. SCD's refused per patient. Call light remained in reach. Frequent monitoring completed.

## 2020-01-20 NOTE — SUBJECTIVE & OBJECTIVE
Interval History: Patient today feels better comparing to last night, she was active, smiling and answering questions. NGT placed 1/19 for decompression, patient had BM. AES for biopsy 1/20.     Review of Systems   Constitutional: Positive for activity change, appetite change, fatigue and unexpected weight change. Negative for chills, diaphoresis and fever.   HENT: Negative for congestion and trouble swallowing.    Eyes: Negative for pain.   Respiratory: Negative for cough, shortness of breath and wheezing.    Cardiovascular: Negative for chest pain, palpitations and leg swelling.   Gastrointestinal: Positive for constipation, nausea and vomiting. Negative for abdominal distention, abdominal pain, blood in stool and diarrhea.   Genitourinary: Negative for dysuria, enuresis, flank pain, frequency and hematuria.   Musculoskeletal: Positive for back pain.   Skin: Negative for color change.   Psychiatric/Behavioral: Negative for agitation.     Objective:     Vital Signs (Most Recent):  Temp: 96.7 °F (35.9 °C) (01/20/20 0757)  Pulse: 91 (01/20/20 0757)  Resp: 20 (01/20/20 0757)  BP: (!) 165/79 (01/20/20 0757)  SpO2: (!) 93 % (01/20/20 0757) Vital Signs (24h Range):  Temp:  [96.6 °F (35.9 °C)-99.3 °F (37.4 °C)] 96.7 °F (35.9 °C)  Pulse:  [79-91] 91  Resp:  [16-20] 20  SpO2:  [93 %-97 %] 93 %  BP: (142-165)/(68-79) 165/79     Weight: 77.6 kg (171 lb)  Body mass index is 23.85 kg/m².    Intake/Output Summary (Last 24 hours) at 1/20/2020 0923  Last data filed at 1/20/2020 0338  Gross per 24 hour   Intake 1366.25 ml   Output 2950 ml   Net -1583.75 ml      Physical Exam   Constitutional: She is oriented to person, place, and time. She appears well-developed and well-nourished.   Patient look tired and in pain and bad acid reflux    HENT:   Head: Normocephalic and atraumatic.   Eyes: Pupils are equal, round, and reactive to light. EOM are normal.   Neck: Normal range of motion. Neck supple.   Cardiovascular: Normal rate and  regular rhythm.   No murmur heard.  Pulmonary/Chest: Effort normal and breath sounds normal.   Abdominal: Soft. Bowel sounds are normal. She exhibits distension. There is no tenderness.   Old midline lower abdominal incision scar from ruptured appendix.     Musculoskeletal: Normal range of motion. She exhibits no edema or deformity.   Neurological: She is alert and oriented to person, place, and time.   Skin: Skin is warm and dry.   Psychiatric:   Depressed mood.       Significant Labs: All pertinent labs within the past 24 hours have been reviewed.    Significant Imaging: I have reviewed and interpreted all pertinent imaging results/findings within the past 24 hours.

## 2020-01-20 NOTE — NURSING TRANSFER
Nursing Transfer Note      1/20/2020     Transfer To: 1055     Transfer via stretcher    Transfer with NG tube     Transported by Transport     Medicines sent: IV pole and maintenance fluids     Chart send with patient: Yes    Notified: Nurse on 10th floor     Patient reassessed at: 1150 1/20/2020

## 2020-01-20 NOTE — NURSING
Patient escorted off unit via stretcher with transport personnel to endoscopy. IVF infusing, jewelry and underwear removed. NG to MAGDALENO major clamped, NPO status maintained. Safety maintained.

## 2020-01-21 LAB
ADEQUACY: ABNORMAL
ALBUMIN SERPL BCP-MCNC: 2.1 G/DL (ref 3.5–5.2)
ALP SERPL-CCNC: 137 U/L (ref 55–135)
ALT SERPL W/O P-5'-P-CCNC: 89 U/L (ref 10–44)
ANION GAP SERPL CALC-SCNC: 12 MMOL/L (ref 8–16)
AST SERPL-CCNC: 77 U/L (ref 10–40)
BASOPHILS # BLD AUTO: 0.06 K/UL (ref 0–0.2)
BASOPHILS NFR BLD: 0.5 % (ref 0–1.9)
BILIRUB SERPL-MCNC: 1 MG/DL (ref 0.1–1)
BUN SERPL-MCNC: 13 MG/DL (ref 8–23)
CALCIUM SERPL-MCNC: 8 MG/DL (ref 8.7–10.5)
CHLORIDE SERPL-SCNC: 98 MMOL/L (ref 95–110)
CO2 SERPL-SCNC: 27 MMOL/L (ref 23–29)
CREAT SERPL-MCNC: 0.9 MG/DL (ref 0.5–1.4)
DIFFERENTIAL METHOD: ABNORMAL
EOSINOPHIL # BLD AUTO: 0.1 K/UL (ref 0–0.5)
EOSINOPHIL NFR BLD: 0.7 % (ref 0–8)
ERYTHROCYTE [DISTWIDTH] IN BLOOD BY AUTOMATED COUNT: 13.9 % (ref 11.5–14.5)
EST. GFR  (AFRICAN AMERICAN): >60 ML/MIN/1.73 M^2
EST. GFR  (NON AFRICAN AMERICAN): >60 ML/MIN/1.73 M^2
FINAL PATHOLOGIC DIAGNOSIS: ABNORMAL
GLUCOSE SERPL-MCNC: 79 MG/DL (ref 70–110)
HCT VFR BLD AUTO: 34 % (ref 37–48.5)
HGB BLD-MCNC: 10.8 G/DL (ref 12–16)
IMM GRANULOCYTES # BLD AUTO: 0.09 K/UL (ref 0–0.04)
IMM GRANULOCYTES NFR BLD AUTO: 0.7 % (ref 0–0.5)
LYMPHOCYTES # BLD AUTO: 1 K/UL (ref 1–4.8)
LYMPHOCYTES NFR BLD: 8 % (ref 18–48)
MAGNESIUM SERPL-MCNC: 1.8 MG/DL (ref 1.6–2.6)
MCH RBC QN AUTO: 30.7 PG (ref 27–31)
MCHC RBC AUTO-ENTMCNC: 31.8 G/DL (ref 32–36)
MCV RBC AUTO: 97 FL (ref 82–98)
MONOCYTES # BLD AUTO: 1.1 K/UL (ref 0.3–1)
MONOCYTES NFR BLD: 8.7 % (ref 4–15)
NEUTROPHILS # BLD AUTO: 10.7 K/UL (ref 1.8–7.7)
NEUTROPHILS NFR BLD: 81.4 % (ref 38–73)
NRBC BLD-RTO: 0 /100 WBC
PHOSPHATE SERPL-MCNC: 3.3 MG/DL (ref 2.7–4.5)
PLATELET # BLD AUTO: 352 K/UL (ref 150–350)
PMV BLD AUTO: 9.9 FL (ref 9.2–12.9)
POTASSIUM SERPL-SCNC: 3.2 MMOL/L (ref 3.5–5.1)
PROT SERPL-MCNC: 5.6 G/DL (ref 6–8.4)
RBC # BLD AUTO: 3.52 M/UL (ref 4–5.4)
SODIUM SERPL-SCNC: 137 MMOL/L (ref 136–145)
WBC # BLD AUTO: 13.07 K/UL (ref 3.9–12.7)

## 2020-01-21 PROCEDURE — 99233 SBSQ HOSP IP/OBS HIGH 50: CPT | Mod: ,,, | Performed by: HOSPITALIST

## 2020-01-21 PROCEDURE — 25000003 PHARM REV CODE 250: Performed by: HOSPITALIST

## 2020-01-21 PROCEDURE — 63600175 PHARM REV CODE 636 W HCPCS: Performed by: HOSPITALIST

## 2020-01-21 PROCEDURE — C9113 INJ PANTOPRAZOLE SODIUM, VIA: HCPCS | Performed by: STUDENT IN AN ORGANIZED HEALTH CARE EDUCATION/TRAINING PROGRAM

## 2020-01-21 PROCEDURE — 20600001 HC STEP DOWN PRIVATE ROOM

## 2020-01-21 PROCEDURE — 63600175 PHARM REV CODE 636 W HCPCS: Performed by: STUDENT IN AN ORGANIZED HEALTH CARE EDUCATION/TRAINING PROGRAM

## 2020-01-21 PROCEDURE — 36415 COLL VENOUS BLD VENIPUNCTURE: CPT

## 2020-01-21 PROCEDURE — 83735 ASSAY OF MAGNESIUM: CPT

## 2020-01-21 PROCEDURE — 25000003 PHARM REV CODE 250: Performed by: STUDENT IN AN ORGANIZED HEALTH CARE EDUCATION/TRAINING PROGRAM

## 2020-01-21 PROCEDURE — 80053 COMPREHEN METABOLIC PANEL: CPT

## 2020-01-21 PROCEDURE — 85025 COMPLETE CBC W/AUTO DIFF WBC: CPT

## 2020-01-21 PROCEDURE — 84100 ASSAY OF PHOSPHORUS: CPT

## 2020-01-21 PROCEDURE — 99233 PR SUBSEQUENT HOSPITAL CARE,LEVL III: ICD-10-PCS | Mod: ,,, | Performed by: HOSPITALIST

## 2020-01-21 RX ORDER — MAGNESIUM SULFATE HEPTAHYDRATE 40 MG/ML
2 INJECTION, SOLUTION INTRAVENOUS ONCE
Status: COMPLETED | OUTPATIENT
Start: 2020-01-21 | End: 2020-01-21

## 2020-01-21 RX ORDER — POTASSIUM CHLORIDE 7.45 MG/ML
10 INJECTION INTRAVENOUS
Status: DISPENSED | OUTPATIENT
Start: 2020-01-21 | End: 2020-01-21

## 2020-01-21 RX ADMIN — POTASSIUM CHLORIDE 10 MEQ: 7.46 INJECTION, SOLUTION INTRAVENOUS at 12:01

## 2020-01-21 RX ADMIN — MORPHINE SULFATE 4 MG: 2 INJECTION, SOLUTION INTRAMUSCULAR; INTRAVENOUS at 10:01

## 2020-01-21 RX ADMIN — PIPERACILLIN AND TAZOBACTAM 4.5 G: 4; .5 INJECTION, POWDER, LYOPHILIZED, FOR SOLUTION INTRAVENOUS; PARENTERAL at 08:01

## 2020-01-21 RX ADMIN — POTASSIUM CHLORIDE 10 MEQ: 7.46 INJECTION, SOLUTION INTRAVENOUS at 11:01

## 2020-01-21 RX ADMIN — PIPERACILLIN AND TAZOBACTAM 4.5 G: 4; .5 INJECTION, POWDER, LYOPHILIZED, FOR SOLUTION INTRAVENOUS; PARENTERAL at 12:01

## 2020-01-21 RX ADMIN — PANTOPRAZOLE SODIUM 40 MG: 40 INJECTION, POWDER, FOR SOLUTION INTRAVENOUS at 10:01

## 2020-01-21 RX ADMIN — HYDROCHLOROTHIAZIDE 12.5 MG: 12.5 TABLET ORAL at 10:01

## 2020-01-21 RX ADMIN — LISINOPRIL 20 MG: 20 TABLET ORAL at 10:01

## 2020-01-21 RX ADMIN — SODIUM CHLORIDE, SODIUM LACTATE, POTASSIUM CHLORIDE, AND CALCIUM CHLORIDE: 600; 310; 30; 20 INJECTION, SOLUTION INTRAVENOUS at 08:01

## 2020-01-21 RX ADMIN — PIPERACILLIN SODIUM,TAZOBACTAM SODIUM 4.5 G: 4; .5 INJECTION, POWDER, FOR SOLUTION INTRAVENOUS at 04:01

## 2020-01-21 RX ADMIN — POTASSIUM CHLORIDE 10 MEQ: 7.46 INJECTION, SOLUTION INTRAVENOUS at 10:01

## 2020-01-21 RX ADMIN — POLYETHYLENE GLYCOL 3350 17 G: 17 POWDER, FOR SOLUTION ORAL at 10:01

## 2020-01-21 RX ADMIN — MAGNESIUM SULFATE IN WATER 2 G: 40 INJECTION, SOLUTION INTRAVENOUS at 10:01

## 2020-01-21 RX ADMIN — ZOLPIDEM TARTRATE 5 MG: 5 TABLET ORAL at 01:01

## 2020-01-21 NOTE — NURSING
Ambulated to bathroom with standby assist. tolerated well, family members at bedside. denies current needs.

## 2020-01-21 NOTE — PLAN OF CARE
POC reviewed, pt verbalized understanding. VSS on RA, BP elevated. No pain expressed, discomfort from NGT. Voids per toilet, adequate UOP overnight. NGT to LIWS. Pt NPO w/ no complaints of N/V. Pt slept between care. Frequent rounds made for safety, call light in reach. WCTM

## 2020-01-21 NOTE — ASSESSMENT & PLAN NOTE
US abdomen: Distended fluid-filled stomach in the left upper quadrant.  Most likely related to pancreatic mass/ascites     - S/P NG tube insertion and draining.  - Feeling well after and tolerated diet on 1/21

## 2020-01-21 NOTE — NURSING
tolerated apple juice by mouth, advanced diet to clear liquids. states she is ordering lunch, denies current needs, family members at bedside.

## 2020-01-21 NOTE — PLAN OF CARE
Patient able to tolerate clear liquid diet. Denies current needs, pain or nausea. family at bedside.

## 2020-01-21 NOTE — SUBJECTIVE & OBJECTIVE
Interval History: States she was discomfort last night as her NG tube was not draining well as it kept falling and nurse did multiple attempts to secure it. This morning was readjusted and drained ~600 cc. Per GI, clamp her NG. She was started on clear liquid and she tolerated diet.     Review of Systems   Constitutional: Positive for fatigue. Negative for chills, diaphoresis and fever. Appetite change: improved.   HENT: Negative for congestion and trouble swallowing.    Eyes: Negative for pain.   Respiratory: Negative for cough, shortness of breath and wheezing.    Cardiovascular: Negative for chest pain, palpitations and leg swelling.   Gastrointestinal: Negative for abdominal distention, abdominal pain, blood in stool and diarrhea. Nausea: improved. Vomiting: improved.   Genitourinary: Negative for dysuria, enuresis, flank pain, frequency and hematuria.   Musculoskeletal: Positive for back pain. Myalgias: Rt shoulder, improved.   Skin: Negative for color change.   Psychiatric/Behavioral: Negative for agitation.     Objective:     Vital Signs (Most Recent):  Temp: 97.6 °F (36.4 °C) (01/21/20 0747)  Pulse: 85 (01/21/20 0747)  Resp: 18 (01/21/20 0747)  BP: (!) 177/87 (01/21/20 0747)  SpO2: (!) 94 % (01/21/20 0747) Vital Signs (24h Range):  Temp:  [97.6 °F (36.4 °C)-98.7 °F (37.1 °C)] 97.6 °F (36.4 °C)  Pulse:  [82-90] 85  Resp:  [16-20] 18  SpO2:  [93 %-96 %] 94 %  BP: (159-177)/(69-87) 177/87     Weight: 77.6 kg (171 lb)  Body mass index is 23.85 kg/m².    Intake/Output Summary (Last 24 hours) at 1/21/2020 1253  Last data filed at 1/21/2020 0800  Gross per 24 hour   Intake 2411.67 ml   Output 1250 ml   Net 1161.67 ml      Physical Exam   Constitutional: She is oriented to person, place, and time. She appears well-developed and well-nourished. No distress.   Feeling well today and wants to eat    HENT:   Head: Normocephalic and atraumatic.   Eyes: Pupils are equal, round, and reactive to light. EOM are normal.    Neck: Normal range of motion. Neck supple.   Cardiovascular: Normal rate and regular rhythm.   No murmur heard.  Pulmonary/Chest: Effort normal and breath sounds normal.   Abdominal: Soft. Bowel sounds are normal. Distention: more soft today. There is no tenderness.   Old midline lower abdominal incision scar from ruptured appendix.     Musculoskeletal: Normal range of motion. She exhibits no edema or deformity.   Neurological: She is alert and oriented to person, place, and time.   Skin: Skin is warm and dry. She is not diaphoretic.   Psychiatric: She has a normal mood and affect. Her behavior is normal. Judgment and thought content normal.   Reactive and happy today       Significant Labs: All pertinent labs within the past 24 hours have been reviewed.    Significant Imaging: I have reviewed all pertinent imaging results/findings within the past 24 hours.

## 2020-01-21 NOTE — PROGRESS NOTES
"[facility] Hospital Medicine  Progress Note    Patient Name: Radha Alcazar  MRN: [id number]  Patient Class: IP- Inpatient   Admission Date: 1/18/2020  Length of Stay: 3 days  Attending Physician: Diana Turcios MD  Primary Care Provider: Hossein Mortensen MD    [facility]: [facility] HOSP MED 3 Sergio Aguilar MD    Subjective:     Principal Problem:Pancreatic mass        HPI:  Ms. Alcazar is a 65 yo F with PMHx of HTN and appendectomy who transferred from [facility] for further evaluation of pancreatic mass be hem/Onc and possible biopsy by EUS.     Initially patient hospitalized in [facility] ([facility]) for persistent vomiting and R shoulder pain. CT scan performed at [facility] and was told she had gallbladder stones and a mass on pancreas and was referred to cancer center. Then she presented to [facility] for same problem. Further evaluation done and CT multiple abnormalities.     Pt stated she had decreased appetite since thanksgiving due to the feeling of "food getting stuck in chest" as well as epigastric pain that radiates to her R shoulder. Symptoms have progressively worsened with decreased ability to tolerate PO. Pt reported a 40lbs unintentional weight loss since Nov 2019. She endorses minimal intake for the last 2 wks, constipation, acid reflux and N/V. Denied chills or night sweat. Denied abdominal pain, or diarrhea. Denied SOB, chest pain, cough or palpitation.   Patient never smoke. She don't drink alcohol. NKDA. Family Hx of breast cancer and colon cancer in her mother. She lives in her house with her granddaughter who has Autism.         Overview/Hospital Course:  Patient admitted for further evaluation for pancreatic mass.   CT abdomen (1/17) revealed multiple abnormalities: Distended stomach, 1st, 2nd and 3rd portions of the duodenum suggestive of an obstructive process.  Ascites in the abdomen and pelvis and abnormal low-density foci along the capsule of the " right lobe of the liver laterally and inferiorly suspicious for carcinomatosis. Abnormal diminished attenuation in the region of the body and tail of the pancreas with fluid adjacent to these areas.  Findings could represent pancreatic neoplasm and/or pancreatitis. Bilateral pleural effusions with airspace consolidation in the lower lobes bilaterally as well as in the right middle lobe.     Patient didn't had BM for couple of days. Concern about bowel obstruction overnight, Xray negative for obstruction. Patient on clear liquid diet and NPO after midnight for biopsy by AES 1/20. NGT placed 1/19 for decompression, drain 2220 cc over 24hrs     Mass biopsy done by AES 1/20: - LA Grade C reflux esophagitis.A medium amount of food (residue) in the stomach. Acquired duodenal stenosis. A mass was identified in the pancreatic head. Tissue was obtained from this exam, and results are pending. However, the endosonographic appearance is consistent with adenocarcinoma. Surgical oncology consulted and per GI, patient NG tube clamped. She tolerated diet and waiting for final biopsy and Gen Surg rec's.     Interval History: States she was discomfort last night as her NG tube was not draining well as it kept falling and nurse did multiple attempts to secure it. This morning was readjusted and drained ~600 cc. Per GI, clamp her NG. She was started on clear liquid and she tolerated diet.     Review of Systems   Constitutional: Positive for fatigue. Negative for chills, diaphoresis and fever. Appetite change: improved.   HENT: Negative for congestion and trouble swallowing.    Eyes: Negative for pain.   Respiratory: Negative for cough, shortness of breath and wheezing.    Cardiovascular: Negative for chest pain, palpitations and leg swelling.   Gastrointestinal: Negative for abdominal distention, abdominal pain, blood in stool and diarrhea. Nausea: improved. Vomiting: improved.   Genitourinary: Negative for dysuria, enuresis, flank  pain, frequency and hematuria.   Musculoskeletal: Positive for back pain. Myalgias: Rt shoulder, improved.   Skin: Negative for color change.   Psychiatric/Behavioral: Negative for agitation.     Objective:     Vital Signs (Most Recent):  Temp: 97.6 °F (36.4 °C) (01/21/20 0747)  Pulse: 85 (01/21/20 0747)  Resp: 18 (01/21/20 0747)  BP: (!) 177/87 (01/21/20 0747)  SpO2: (!) 94 % (01/21/20 0747) Vital Signs (24h Range):  Temp:  [97.6 °F (36.4 °C)-98.7 °F (37.1 °C)] 97.6 °F (36.4 °C)  Pulse:  [82-90] 85  Resp:  [16-20] 18  SpO2:  [93 %-96 %] 94 %  BP: (159-177)/(69-87) 177/87     Weight: 77.6 kg (171 lb)  Body mass index is 23.85 kg/m².    Intake/Output Summary (Last 24 hours) at 1/21/2020 1253  Last data filed at 1/21/2020 0800  Gross per 24 hour   Intake 2411.67 ml   Output 1250 ml   Net 1161.67 ml      Physical Exam   Constitutional: She is oriented to person, place, and time. She appears well-developed and well-nourished. No distress.   Feeling well today and wants to eat    HENT:   Head: Normocephalic and atraumatic.   Eyes: Pupils are equal, round, and reactive to light. EOM are normal.   Neck: Normal range of motion. Neck supple.   Cardiovascular: Normal rate and regular rhythm.   No murmur heard.  Pulmonary/Chest: Effort normal and breath sounds normal.   Abdominal: Soft. Bowel sounds are normal. Distention: more soft today. There is no tenderness.   Old midline lower abdominal incision scar from ruptured appendix.     Musculoskeletal: Normal range of motion. She exhibits no edema or deformity.   Neurological: She is alert and oriented to person, place, and time.   Skin: Skin is warm and dry. She is not diaphoretic.   Psychiatric: She has a normal mood and affect. Her behavior is normal. Judgment and thought content normal.   Reactive and happy today       Significant Labs: All pertinent labs within the past 24 hours have been reviewed.    Significant Imaging: I have reviewed all pertinent imaging  "results/findings within the past 24 hours.      Assessment/Plan:      * Pancreatic mass  Ms. Alcazar is a 63 yo F with PMHx of HTN and appendectomy who transferred from [facility] for further evaluation of pancreatic mass be hem/Onc and possible biopsy by EUS.    Pt stated she had decreased appetite since thanksgiving due to the feeling of "food getting stuck in chest" as well as epigastric pain that radiates to her R shoulder. Symptoms have progressively worsened with decreased ability to tolerate PO. Pt reported a 40lbs unintentional weight loss since Nov 2019. She endorses minimal intake for the last 2 wks, constipation, acid reflux and N/V. Denied chills or night sweat. Denied abdominal pain, or diarrhea.     CT abdomen with multiple abnormalities: Distended stomach, 1st, 2nd and 3rd portions of the duodenum suggestive of an obstructive process.  Ascites in the abdomen and pelvis and abnormal low-density foci along the capsule of the right lobe of the liver laterally and inferiorly suspicious for carcinomatosis. Abnormal diminished attenuation in the region of the body and tail of the pancreas with fluid adjacent to these areas.  Findings could represent pancreatic neoplasm and/or pancreatitis. Bilateral pleural effusions with airspace consolidation in the lower lobes bilaterally as well as in the right middle lobe.    Giving the presentation and CT finding, concerning for cancer process   AES 1/20: - LA Grade C reflux esophagitis. A medium amount of food (residue) in the stomach. Acquired duodenal stenosis. A mass was identified in the pancreatic head. Tissue was obtained from this exam, and results are pending. However, the endosonographic appearance is consistent with adenocarcinoma.   - Oncology/ surgical oncology consulted, appreciate recs   - Repeat CT abdomen, results pending           Depressed mood  Patient in very depressed mood due to her medical condition. She feels depressed, " lost of intrest. No planning of harming herself or others. Difficulty sleeping related to the Acid reflux. She doesn't feels guilty or had dec of concentration.     Plan:   - Will consider starting SSRI once later once definitive diagnoses is establish and no improvement in her condition.       Normocytic anemia  Hb on admission 11.6  Iron WNL. Transferrin and TIBC decrease   Workup showed most likely to be Anemia of chronic disease     Plan:   - Daily CBC         Leukocytosis        Transaminitis    . AST 75 and ALT 90  Normal bili   CT showed: A 1 cm low-density abnormality is noted in the anterior segment of the right lobe of the liver as seen on axial image 24 and series 2.  Levels stabilized           Hypokalemia  - Replete as needed       Abnormal CT of the abdomen  See pancreatic mass       Pleural effusion  CT showed moderate bilateral pleural effusions. Patient Asymptomatic.   May consider thoracentesis and fluid analysis for further diagnosis if clinically worsened      Lung consolidation  CT/Xray showed Bilateral lung consolidation, patient afebrile, denied cough, SOB or chest pain.   + leukocytosis   Vanc and zosyn initiated outside the facility      Ddx: Most likely pneumonia, [place]?   Leukocytosis improved after starting Abx     Plan:   - de-esclate Abx to zosyn, stop date 1/21  - Follow RIP, Respiratory CX, Blood Cx     Intractable nausea and vomiting  US abdomen: Distended fluid-filled stomach in the left upper quadrant.  Most likely related to pancreatic mass/ascites     - S/P NG tube insertion and draining.  - Feeling well after and tolerated diet on 1/21    Hypertension  BP elevated since the admission, most likely from the pain and anxiety     Plan:   - Resume home Lisinopril and hydrochlorothiazide   - Monitor and adjust meds as needed         VTE Risk Mitigation (From admission, onward)         Ordered     Place sequential compression device  Until discontinued      01/18/20 1805     IP  VTE LOW RISK PATIENT  Once      01/18/20 4861                      Sergio Aguilar MD  Department of Hospital Medicine   [facility]

## 2020-01-21 NOTE — NURSING
son at bedside, NG tube clamped and placed in wheelchair with transporter at side to go for CT chest.

## 2020-01-21 NOTE — TREATMENT PLAN
AES Treatment Plan  01/21/2020  8:33 AM    Chart reviewed, patient seen, and case discussed with attending.  Patient s/p EUS with biopsy and reports feeling OK this morning, actually asked about having NGT removed. Focused abdominal exam with no abdominal distention/tenderness and labs stable.    Recommendation:         - NPO with NGT in place  - Continue present medications  - Await cytology results  - Consult surgery oncology   - We will continue to follow along side the primary team, please call with any additional questions or concerns     Martin Eddy M.D.  Gastroenterology Fellow, PGY-VI  Pager: [phone]  [facility]

## 2020-01-21 NOTE — CONSULTS
Consult Note  Surgical oncology      HPI: 63 yo female with hx of HTN who transferred from [facility] for pancreatic mass.   Had CT abdomen/pelvis with concern for carcinomatosis along with the pancreatic head mass.   Underwent EUS yesterday with Dr. Messer -found to have LA Grade C esophagitis, extrinsic mild stenosis in the first portion of the duodenum, as well as pancreatic head mass, measuring 3.5cm x 2.5cm. There was invasion into the portal vein. FNA biopsy was completed.     NGT placed on 1/19 for decompression, drained over 2L on that day, now output has significantly decreased. Had BM yesterday. Has lot significant amount of weight - 40lbs in the last 3 months. Albumin 2.2  She complains of dysphagia - sensation of food getting stuck in her chest, as well as epigastric pain that radiates to her R shoulder. Associated symptoms include decreased intake, constipation, reflux and nausea/vomiting. No fever/chills, SOB.     Does have leukocytosis with bilateral pleural effusion. Concern for possible pneumonia, started on antibiotics. CT chest pending.     Family history of breast and colon cancer in her mother.    Nonsmoker.     She lives at home with her grandaunayely who is Autistic.       PMH:   Past Medical History:   Diagnosis Date    Hypertension        PSH:   Past Surgical History:   Procedure Laterality Date    APPENDECTOMY         Allergies: Review of patient's allergies indicates:  No Known Allergies  Meds:   Current Discharge Medication List      CONTINUE these medications which have NOT CHANGED    Details   aspirin-acetaminophen-caffeine 250-250-65 mg (EXCEDRIN EXTRA STRENGTH) 250-250-65 mg per tablet Take 2 tablets by mouth daily as needed for Pain.      lisinopril-hydrochlorothiazide (PRINZIDE,ZESTORETIC) 20-12.5 mg per tablet Take 1 tablet by mouth once daily.      ondansetron (ZOFRAN) 8 MG tablet Take 8 mg by mouth every 8 (eight) hours.      pantoprazole (PROTONIX) 40 MG tablet Take 40 mg  by mouth once daily.      promethazine (PHENERGAN) 25 MG suppository Place 25 mg rectally every 6 (six) hours as needed for Nausea.      sucralfate (CARAFATE) 1 gram tablet Take 1 g by mouth 4 (four) times daily.             Family History   Problem Relation Age of Onset    Cancer Mother     Cancer Brother        Review of Systems - For pertinent positives please see HPI   Constitutional: Negative for fever and chills.   HENT: Negative for congestion and ear pain.   Respiratory:   Cardiovascular: Negative for chest pain and palpitations.   Gastrointestinal: see HPI   Genitourinary: Negative for dysuria and hematuria.   Musculoskeletal: +fatigue.   Skin: Negative for rash and wound.     OBJECTIVE:     Vital Signs (Most Recent)  Temp: 98.7 °F (37.1 °C) (01/20/20 2051)  Pulse: 85 (01/20/20 2051)  Resp: 18 (01/20/20 2051)  BP: (!) 159/69 (01/20/20 2051)  SpO2: 95 % (01/20/20 2051)    Vital Signs Range (Last 24H):  Temp:  [96.6 °F (35.9 °C)-98.7 °F (37.1 °C)]   Pulse:  []   Resp:  [15-21]   BP: (151-167)/(69-79)   SpO2:  [93 %-97 %]     I & O (Last 24H):    Intake/Output Summary (Last 24 hours) at 1/20/2020 2102  Last data filed at 1/20/2020 2057  Gross per 24 hour   Intake 2611.67 ml   Output 1600 ml   Net 1011.67 ml       Physical Exam:  General: well developed, well nourished, no distress   NG in place, bilious contents   Lungs:  clear to auscultation bilaterally and normal respiratory effort  Heart: regular rate and rhythm, S1, S2 normal, no murmur, rub or gallop  Abdomen: soft, non-tender non-distented; bowel sounds normal; no masses,  no organomegaly  Neuro: A&O x3, no focal deficits       Laboratory:  CBC:   Recent Labs   Lab 01/20/20  0710   WBC 14.55*   RBC 3.69*   HGB 11.5*   HCT 35.7*   *   MCV 97   MCH 31.2*   MCHC 32.2     CMP:   Recent Labs   Lab 01/20/20  0710   GLU 90   CALCIUM 8.3*   ALBUMIN 2.2*   PROT 5.8*      K 4.1   CO2 29   CL 97   BUN 15   CREATININE 1.1   ALKPHOS 140*   ALT  88*   AST 74*   BILITOT 0.8     Coagulation:   Recent Labs   Lab 01/18/20  0525   INR 1.5*     Ca 19-9: 36094    Labs within the past 24 hours have been reviewed.    CT A/P 1/17/19:  Lower thorax:    There are moderate bilateral pleural effusions.  Consolidation is noted in the right middle lobe and in the lower lobes bilaterally.    Upper abdomen:    The stomach is distended with air and fluid and there is also distension of the 1st, 2nd and 3rd portions of the duodenum.    There is ascites in the right upper quadrant with low-density abnormalities along the capsule of the right lobe of the liver laterally and inferiorly.    There is mild thickening of the wall of the gallbladder.  There is intrahepatic biliary duct dilatation in the left lobe of the liver.    A 1 cm low-density abnormality is noted in the anterior segment of the right lobe of the liver as seen on axial image 24 and series 2.    The spleen enhances normally.  There is abnormal diminished attenuation associated with the body and tail of the pancreas and fluid density ventral to the pancreas.    The adrenal glands are not enlarged.  The kidneys enhance normally.  The abdominal aorta is normal in caliber.    There is air in the nondependent portion of the urinary bladder.  The uterus is in the midline and anteverted.  There is ascites in the posterior inferior aspect of the pelvis.    There is inhomogeneous attenuation in the omentum in the left lower quadrant and in the right lower quadrant which could represent metastatic disease.    Scattered fecal material is noted in the colon.    Delayed images:    There is contrast excretion into both renal pelvocaliceal systems and contrast accumulation in the urinary bladder.    Osseous structures:    There is dextroscoliosis of the lumbar spine.      Impression       Distended stomach, 1st, 2nd and 3rd portions of the duodenum suggestive of an obstructive process.  Ascites in the abdomen and pelvis and  abnormal low-density foci along the capsule of the right lobe of the liver laterally and inferiorly suspicious for carcinomatosis.  Abnormal diminished attenuation in the region of the body and tail of the pancreas with fluid adjacent to these areas.  Findings could represent pancreatic neoplasm and/or pancreatitis.  Bilateral pleural effusions with airspace consolidation in the lower lobes bilaterally as well as in the right middle lobe.  Air in the nondependent portion of the urinary bladder could be correlated for recent instrumentation.     CT chest: pending     ASSESSMENT/PLAN:     65 yo female with pancreatic head mass concerning for malignancy, appears unresectable     - CT scan reviewed - invasion into the portal vein with concern for possible carcinomatosis with minimal ascites and multiple liver lesions consistent with Stage IV disease   - CT chest completed  - CT pancreatic protocol was not performed - difficult to assess SMV/SMA invasion  -once tissue biopsy confirmed, would be happy to place port for chemotherapy if needed and/or palliative PEG.   - clamp NG tube to see if tolerates without NG on suction. If has emesis, can replace to suction.   - recommend palliative care to see patient and f/u with heme onc. Under the impression, that heme onc is okay to treat for metastatic disease based on ct scan findings. There are liver lesions that could possibly undergo biopsy if tissue needed  - expressed desire to follow up closer to home, could schedule port as well as chemotherapy at [facility] as well.     Manjit Sanchez, PGY-5  General Surgery  [phone]

## 2020-01-21 NOTE — PLAN OF CARE
Patient is a 64 year old female admitted in transfer from [facility] 1/18/2020 with Abdominal Pain, N&V, Pancreatic Mass.  Patient is expected to discharge home with needs TBD +/- 1/23/2020.    Patient's family at bedside during visit, answered questions as patient is resting with eyes closed.  Patient lives alone in a mobile home with 5 steps to enter.  [facility] given to patient and/or family with understanding verbalized.  CM name and number written on white board in patient's room with request to call for any questions and concerns.  Will continue to follow for needs.    PCP  Hossein Mortensen MD  [address] [facility] [place] [address]  [phone]      [facility] [address] [place] [address]  Phone: [phone] Fax: [phone]    [facility] - [place], [place] - [address]  [address]  [place] [address]  Phone: [phone] Fax: [phone]      Extended Emergency Contact Information  Primary Emergency Contact: Talita Marte  Address: [address] [facility] [place] [address] [place]  Home Phone: [phone]  Mobile Phone: [phone]  Relation: Daughter  Secondary Emergency Contact: AURELIA ARELLANO  Mobile Phone: [phone]  Relation: Sister       01/20/20 2010   Discharge Assessment   Assessment Type Discharge Planning Assessment   Confirmed/corrected address and phone number on facesheet? Yes   Assessment information obtained from? Other  (family)   Expected Length of Stay (days) 4   Prior to hospitilization cognitive status: Alert/Oriented   Prior to hospitalization functional status: Independent   Current cognitive status: Alert/Oriented   Current Functional Status: Independent   Lives With alone   Able to Return to Prior Arrangements yes   Is patient able to care for self after discharge? Yes   Who are your caregiver(s) and their phone number(s)? family   Patient's  perception of discharge disposition home or selfcare   Equipment Currently Used at Home none   Do you have any problems affording any of your prescribed medications? No   Is the patient taking medications as prescribed? yes   Does the patient have transportation home? Yes   Transportation Anticipated family or friend will provide   Discharge Plan A Home   Discharge Plan B Home;Home Health   DME Needed Upon Discharge  none   Patient/Family in Agreement with Plan yes

## 2020-01-21 NOTE — ASSESSMENT & PLAN NOTE
"Ms. Alcazar is a 65 yo F with PMHx of HTN and appendectomy who transferred from [facility] for further evaluation of pancreatic mass be hem/Onc and possible biopsy by EUS.    Pt stated she had decreased appetite since thanksgiving due to the feeling of "food getting stuck in chest" as well as epigastric pain that radiates to her R shoulder. Symptoms have progressively worsened with decreased ability to tolerate PO. Pt reported a 40lbs unintentional weight loss since Nov 2019. She endorses minimal intake for the last 2 wks, constipation, acid reflux and N/V. Denied chills or night sweat. Denied abdominal pain, or diarrhea.     CT abdomen with multiple abnormalities: Distended stomach, 1st, 2nd and 3rd portions of the duodenum suggestive of an obstructive process.  Ascites in the abdomen and pelvis and abnormal low-density foci along the capsule of the right lobe of the liver laterally and inferiorly suspicious for carcinomatosis. Abnormal diminished attenuation in the region of the body and tail of the pancreas with fluid adjacent to these areas.  Findings could represent pancreatic neoplasm and/or pancreatitis. Bilateral pleural effusions with airspace consolidation in the lower lobes bilaterally as well as in the right middle lobe.    Giving the presentation and CT finding, concerning for cancer process   AES 1/20: - LA Grade C reflux esophagitis. A medium amount of food (residue) in the stomach. Acquired duodenal stenosis. A mass was identified in the pancreatic head. Tissue was obtained from this exam, and results are pending. However, the endosonographic appearance is consistent with adenocarcinoma.   - Oncology/ surgical oncology consulted, appreciate recs   - Repeat CT abdomen, results pending         "

## 2020-01-21 NOTE — ASSESSMENT & PLAN NOTE
CT showed moderate bilateral pleural effusions. Patient Asymptomatic.   May consider thoracentesis and fluid analysis for further diagnosis if clinically worsened

## 2020-01-21 NOTE — ASSESSMENT & PLAN NOTE
CT/Xray showed Bilateral lung consolidation, patient afebrile, denied cough, SOB or chest pain.   + leukocytosis   Vanc and zosyn initiated outside the facility      Ddx: Most likely pneumonia, [place]?   Leukocytosis improved after starting Abx     Plan:   - de-esclate Abx to zosyn, stop date 1/21  - Follow RIP, Respiratory CX, Blood Cx

## 2020-01-21 NOTE — ASSESSMENT & PLAN NOTE
. AST 75 and ALT 90  Normal bili   CT showed: A 1 cm low-density abnormality is noted in the anterior segment of the right lobe of the liver as seen on axial image 24 and series 2.  Levels stabilized

## 2020-01-22 ENCOUNTER — ANESTHESIA EVENT (OUTPATIENT)
Dept: ENDOSCOPY | Facility: HOSPITAL | Age: 65
DRG: 435 | End: 2020-01-22

## 2020-01-22 ENCOUNTER — ANESTHESIA (OUTPATIENT)
Dept: ENDOSCOPY | Facility: HOSPITAL | Age: 65
DRG: 435 | End: 2020-01-22
Payer: MEDICARE

## 2020-01-22 LAB
ALBUMIN SERPL BCP-MCNC: 2.1 G/DL (ref 3.5–5.2)
ALP SERPL-CCNC: 146 U/L (ref 55–135)
ALT SERPL W/O P-5'-P-CCNC: 80 U/L (ref 10–44)
ANION GAP SERPL CALC-SCNC: 8 MMOL/L (ref 8–16)
AST SERPL-CCNC: 66 U/L (ref 10–40)
BASOPHILS # BLD AUTO: 0.05 K/UL (ref 0–0.2)
BASOPHILS NFR BLD: 0.4 % (ref 0–1.9)
BILIRUB SERPL-MCNC: 0.9 MG/DL (ref 0.1–1)
BUN SERPL-MCNC: 10 MG/DL (ref 8–23)
CALCIUM SERPL-MCNC: 8.1 MG/DL (ref 8.7–10.5)
CHLORIDE SERPL-SCNC: 97 MMOL/L (ref 95–110)
CO2 SERPL-SCNC: 30 MMOL/L (ref 23–29)
CREAT SERPL-MCNC: 0.8 MG/DL (ref 0.5–1.4)
DIFFERENTIAL METHOD: ABNORMAL
EOSINOPHIL # BLD AUTO: 0.2 K/UL (ref 0–0.5)
EOSINOPHIL NFR BLD: 1.6 % (ref 0–8)
ERYTHROCYTE [DISTWIDTH] IN BLOOD BY AUTOMATED COUNT: 13.7 % (ref 11.5–14.5)
EST. GFR  (AFRICAN AMERICAN): >60 ML/MIN/1.73 M^2
EST. GFR  (NON AFRICAN AMERICAN): >60 ML/MIN/1.73 M^2
GLUCOSE SERPL-MCNC: 133 MG/DL (ref 70–110)
HCT VFR BLD AUTO: 33.9 % (ref 37–48.5)
HGB BLD-MCNC: 10.8 G/DL (ref 12–16)
IMM GRANULOCYTES # BLD AUTO: 0.07 K/UL (ref 0–0.04)
IMM GRANULOCYTES NFR BLD AUTO: 0.5 % (ref 0–0.5)
INR PPP: 1.1 (ref 0.8–1.2)
LYMPHOCYTES # BLD AUTO: 1.1 K/UL (ref 1–4.8)
LYMPHOCYTES NFR BLD: 8.7 % (ref 18–48)
MAGNESIUM SERPL-MCNC: 1.9 MG/DL (ref 1.6–2.6)
MCH RBC QN AUTO: 30.5 PG (ref 27–31)
MCHC RBC AUTO-ENTMCNC: 31.9 G/DL (ref 32–36)
MCV RBC AUTO: 96 FL (ref 82–98)
MONOCYTES # BLD AUTO: 1.1 K/UL (ref 0.3–1)
MONOCYTES NFR BLD: 8.8 % (ref 4–15)
NEUTROPHILS # BLD AUTO: 10.2 K/UL (ref 1.8–7.7)
NEUTROPHILS NFR BLD: 80 % (ref 38–73)
NRBC BLD-RTO: 0 /100 WBC
PHOSPHATE SERPL-MCNC: 2 MG/DL (ref 2.7–4.5)
PLATELET # BLD AUTO: 337 K/UL (ref 150–350)
PMV BLD AUTO: 9.5 FL (ref 9.2–12.9)
POTASSIUM SERPL-SCNC: 3.5 MMOL/L (ref 3.5–5.1)
PROT SERPL-MCNC: 5.5 G/DL (ref 6–8.4)
PROTHROMBIN TIME: 11.6 SEC (ref 9–12.5)
RBC # BLD AUTO: 3.54 M/UL (ref 4–5.4)
SODIUM SERPL-SCNC: 135 MMOL/L (ref 136–145)
WBC # BLD AUTO: 12.78 K/UL (ref 3.9–12.7)

## 2020-01-22 PROCEDURE — 43266 EGD ENDOSCOPIC STENT PLACE: CPT | Performed by: INTERNAL MEDICINE

## 2020-01-22 PROCEDURE — 25000003 PHARM REV CODE 250: Performed by: STUDENT IN AN ORGANIZED HEALTH CARE EDUCATION/TRAINING PROGRAM

## 2020-01-22 PROCEDURE — 83735 ASSAY OF MAGNESIUM: CPT

## 2020-01-22 PROCEDURE — 43266 PR EGD, FLEX, W/PLCMT, STENT: ICD-10-PCS | Mod: ,,, | Performed by: INTERNAL MEDICINE

## 2020-01-22 PROCEDURE — 63600175 PHARM REV CODE 636 W HCPCS: Performed by: HOSPITALIST

## 2020-01-22 PROCEDURE — 74360 X-RAY GUIDE GI DILATION: CPT | Mod: 26,,, | Performed by: INTERNAL MEDICINE

## 2020-01-22 PROCEDURE — 63600175 PHARM REV CODE 636 W HCPCS: Performed by: NURSE ANESTHETIST, CERTIFIED REGISTERED

## 2020-01-22 PROCEDURE — C9113 INJ PANTOPRAZOLE SODIUM, VIA: HCPCS | Performed by: STUDENT IN AN ORGANIZED HEALTH CARE EDUCATION/TRAINING PROGRAM

## 2020-01-22 PROCEDURE — 37000008 HC ANESTHESIA 1ST 15 MINUTES: Performed by: INTERNAL MEDICINE

## 2020-01-22 PROCEDURE — C1769 GUIDE WIRE: HCPCS | Performed by: INTERNAL MEDICINE

## 2020-01-22 PROCEDURE — 27202125 HC BALLOON, EXTRACTION (ANY): Performed by: INTERNAL MEDICINE

## 2020-01-22 PROCEDURE — 25000003 PHARM REV CODE 250

## 2020-01-22 PROCEDURE — 63600175 PHARM REV CODE 636 W HCPCS: Performed by: ANESTHESIOLOGY

## 2020-01-22 PROCEDURE — 84100 ASSAY OF PHOSPHORUS: CPT

## 2020-01-22 PROCEDURE — 63600175 PHARM REV CODE 636 W HCPCS: Performed by: STUDENT IN AN ORGANIZED HEALTH CARE EDUCATION/TRAINING PROGRAM

## 2020-01-22 PROCEDURE — 36415 COLL VENOUS BLD VENIPUNCTURE: CPT

## 2020-01-22 PROCEDURE — 63600175 PHARM REV CODE 636 W HCPCS

## 2020-01-22 PROCEDURE — 37000009 HC ANESTHESIA EA ADD 15 MINS: Performed by: INTERNAL MEDICINE

## 2020-01-22 PROCEDURE — 74360 X-RAY GUIDE GI DILATION: CPT | Performed by: INTERNAL MEDICINE

## 2020-01-22 PROCEDURE — 80053 COMPREHEN METABOLIC PANEL: CPT

## 2020-01-22 PROCEDURE — 25000003 PHARM REV CODE 250: Performed by: ANESTHESIOLOGY

## 2020-01-22 PROCEDURE — D9220A PRA ANESTHESIA: Mod: ANES,,, | Performed by: ANESTHESIOLOGY

## 2020-01-22 PROCEDURE — 25000003 PHARM REV CODE 250: Performed by: NURSE ANESTHETIST, CERTIFIED REGISTERED

## 2020-01-22 PROCEDURE — D9220A PRA ANESTHESIA: ICD-10-PCS | Mod: ANES,,, | Performed by: ANESTHESIOLOGY

## 2020-01-22 PROCEDURE — D9220A PRA ANESTHESIA: ICD-10-PCS | Mod: CRNA,,, | Performed by: NURSE ANESTHETIST, CERTIFIED REGISTERED

## 2020-01-22 PROCEDURE — 85610 PROTHROMBIN TIME: CPT

## 2020-01-22 PROCEDURE — C1876 STENT, NON-COA/NON-COV W/DEL: HCPCS | Performed by: INTERNAL MEDICINE

## 2020-01-22 PROCEDURE — D9220A PRA ANESTHESIA: Mod: CRNA,,, | Performed by: NURSE ANESTHETIST, CERTIFIED REGISTERED

## 2020-01-22 PROCEDURE — 74360 PR  X-RAY GUIDE, GI DILATION: ICD-10-PCS | Mod: 26,,, | Performed by: INTERNAL MEDICINE

## 2020-01-22 PROCEDURE — 94761 N-INVAS EAR/PLS OXIMETRY MLT: CPT

## 2020-01-22 PROCEDURE — 85025 COMPLETE CBC W/AUTO DIFF WBC: CPT

## 2020-01-22 PROCEDURE — 43266 EGD ENDOSCOPIC STENT PLACE: CPT | Mod: ,,, | Performed by: INTERNAL MEDICINE

## 2020-01-22 PROCEDURE — 20600001 HC STEP DOWN PRIVATE ROOM

## 2020-01-22 RX ORDER — PROPOFOL 10 MG/ML
VIAL (ML) INTRAVENOUS
Status: DISCONTINUED | OUTPATIENT
Start: 2020-01-22 | End: 2020-01-22

## 2020-01-22 RX ORDER — ONDANSETRON 2 MG/ML
INJECTION INTRAMUSCULAR; INTRAVENOUS
Status: COMPLETED
Start: 2020-01-22 | End: 2020-01-22

## 2020-01-22 RX ORDER — LIDOCAINE HCL/PF 100 MG/5ML
SYRINGE (ML) INTRAVENOUS
Status: DISCONTINUED | OUTPATIENT
Start: 2020-01-22 | End: 2020-01-22

## 2020-01-22 RX ORDER — LABETALOL HCL 20 MG/4 ML
SYRINGE (ML) INTRAVENOUS
Status: COMPLETED
Start: 2020-01-22 | End: 2020-01-22

## 2020-01-22 RX ORDER — LISINOPRIL 20 MG/1
20 TABLET ORAL DAILY
Status: DISCONTINUED | OUTPATIENT
Start: 2020-01-23 | End: 2020-01-26 | Stop reason: HOSPADM

## 2020-01-22 RX ORDER — ROCURONIUM BROMIDE 10 MG/ML
INJECTION, SOLUTION INTRAVENOUS
Status: DISCONTINUED | OUTPATIENT
Start: 2020-01-22 | End: 2020-01-22

## 2020-01-22 RX ORDER — KETAMINE HCL IN 0.9 % NACL 50 MG/5 ML
SYRINGE (ML) INTRAVENOUS
Status: DISCONTINUED | OUTPATIENT
Start: 2020-01-22 | End: 2020-01-22

## 2020-01-22 RX ORDER — HYDROCHLOROTHIAZIDE 25 MG/1
25 TABLET ORAL DAILY
Status: DISCONTINUED | OUTPATIENT
Start: 2020-01-22 | End: 2020-01-22

## 2020-01-22 RX ORDER — ONDANSETRON 2 MG/ML
INJECTION INTRAMUSCULAR; INTRAVENOUS
Status: DISCONTINUED | OUTPATIENT
Start: 2020-01-22 | End: 2020-01-22

## 2020-01-22 RX ORDER — LABETALOL HCL 20 MG/4 ML
10 SYRINGE (ML) INTRAVENOUS
Status: COMPLETED | OUTPATIENT
Start: 2020-01-22 | End: 2020-01-22

## 2020-01-22 RX ORDER — FENTANYL CITRATE 50 UG/ML
INJECTION, SOLUTION INTRAMUSCULAR; INTRAVENOUS
Status: DISCONTINUED | OUTPATIENT
Start: 2020-01-22 | End: 2020-01-22

## 2020-01-22 RX ORDER — HYDROCHLOROTHIAZIDE 12.5 MG/1
12.5 TABLET ORAL DAILY
Status: DISCONTINUED | OUTPATIENT
Start: 2020-01-23 | End: 2020-01-26 | Stop reason: HOSPADM

## 2020-01-22 RX ORDER — PANTOPRAZOLE SODIUM 40 MG/1
40 TABLET, DELAYED RELEASE ORAL DAILY
Status: DISCONTINUED | OUTPATIENT
Start: 2020-01-22 | End: 2020-01-22

## 2020-01-22 RX ORDER — SUCCINYLCHOLINE CHLORIDE 20 MG/ML
INJECTION INTRAMUSCULAR; INTRAVENOUS
Status: DISCONTINUED | OUTPATIENT
Start: 2020-01-22 | End: 2020-01-22

## 2020-01-22 RX ORDER — SODIUM CHLORIDE 0.9 % (FLUSH) 0.9 %
10 SYRINGE (ML) INJECTION
Status: DISCONTINUED | OUTPATIENT
Start: 2020-01-22 | End: 2020-01-22 | Stop reason: HOSPADM

## 2020-01-22 RX ORDER — HYDRALAZINE HYDROCHLORIDE 20 MG/ML
10 INJECTION INTRAMUSCULAR; INTRAVENOUS ONCE
Status: COMPLETED | OUTPATIENT
Start: 2020-01-22 | End: 2020-01-22

## 2020-01-22 RX ORDER — PANTOPRAZOLE SODIUM 40 MG/1
40 TABLET, DELAYED RELEASE ORAL DAILY
Status: DISCONTINUED | OUTPATIENT
Start: 2020-01-23 | End: 2020-01-26 | Stop reason: HOSPADM

## 2020-01-22 RX ORDER — LISINOPRIL 20 MG/1
20 TABLET ORAL DAILY
Status: DISCONTINUED | OUTPATIENT
Start: 2020-01-22 | End: 2020-01-22

## 2020-01-22 RX ORDER — SODIUM,POTASSIUM PHOSPHATES 280-250MG
2 POWDER IN PACKET (EA) ORAL ONCE
Status: COMPLETED | OUTPATIENT
Start: 2020-01-22 | End: 2020-01-22

## 2020-01-22 RX ORDER — ONDANSETRON 2 MG/ML
4 INJECTION INTRAMUSCULAR; INTRAVENOUS ONCE
Status: COMPLETED | OUTPATIENT
Start: 2020-01-22 | End: 2020-01-22

## 2020-01-22 RX ORDER — SODIUM CHLORIDE 9 MG/ML
INJECTION, SOLUTION INTRAVENOUS CONTINUOUS PRN
Status: DISCONTINUED | OUTPATIENT
Start: 2020-01-22 | End: 2020-01-22

## 2020-01-22 RX ORDER — AMLODIPINE BESYLATE 5 MG/1
5 TABLET ORAL DAILY
Status: DISCONTINUED | OUTPATIENT
Start: 2020-01-22 | End: 2020-01-26 | Stop reason: HOSPADM

## 2020-01-22 RX ORDER — MIDAZOLAM HYDROCHLORIDE 1 MG/ML
INJECTION, SOLUTION INTRAMUSCULAR; INTRAVENOUS
Status: DISCONTINUED | OUTPATIENT
Start: 2020-01-22 | End: 2020-01-22

## 2020-01-22 RX ADMIN — ROCURONIUM BROMIDE 5 MG: 10 INJECTION, SOLUTION INTRAVENOUS at 01:01

## 2020-01-22 RX ADMIN — MORPHINE SULFATE 4 MG: 2 INJECTION, SOLUTION INTRAMUSCULAR; INTRAVENOUS at 05:01

## 2020-01-22 RX ADMIN — PROPOFOL 180 MG: 10 INJECTION, EMULSION INTRAVENOUS at 01:01

## 2020-01-22 RX ADMIN — LIDOCAINE HYDROCHLORIDE 100 MG: 20 INJECTION, SOLUTION INTRAVENOUS at 01:01

## 2020-01-22 RX ADMIN — ONDANSETRON 4 MG: 2 INJECTION INTRAMUSCULAR; INTRAVENOUS at 02:01

## 2020-01-22 RX ADMIN — SODIUM CHLORIDE: 0.9 INJECTION, SOLUTION INTRAVENOUS at 01:01

## 2020-01-22 RX ADMIN — ONDANSETRON 4 MG: 2 INJECTION INTRAMUSCULAR; INTRAVENOUS at 01:01

## 2020-01-22 RX ADMIN — LABETALOL HCL IV SOLN PREFILLED SYRINGE 20 MG/4ML (5 MG/ML) 10 MG: 20/4 SOLUTION PREFILLED SYRINGE at 02:01

## 2020-01-22 RX ADMIN — SUCCINYLCHOLINE CHLORIDE 140 MG: 20 INJECTION, SOLUTION INTRAMUSCULAR; INTRAVENOUS at 01:01

## 2020-01-22 RX ADMIN — LISINOPRIL 20 MG: 20 TABLET ORAL at 08:01

## 2020-01-22 RX ADMIN — SODIUM CHLORIDE, SODIUM LACTATE, POTASSIUM CHLORIDE, AND CALCIUM CHLORIDE: 600; 310; 30; 20 INJECTION, SOLUTION INTRAVENOUS at 03:01

## 2020-01-22 RX ADMIN — SODIUM CHLORIDE, SODIUM LACTATE, POTASSIUM CHLORIDE, AND CALCIUM CHLORIDE: 600; 310; 30; 20 INJECTION, SOLUTION INTRAVENOUS at 06:01

## 2020-01-22 RX ADMIN — Medication 20 MG: at 01:01

## 2020-01-22 RX ADMIN — MIDAZOLAM HYDROCHLORIDE 2 MG: 1 INJECTION, SOLUTION INTRAMUSCULAR; INTRAVENOUS at 01:01

## 2020-01-22 RX ADMIN — FENTANYL CITRATE 50 MCG: 50 INJECTION, SOLUTION INTRAMUSCULAR; INTRAVENOUS at 01:01

## 2020-01-22 RX ADMIN — MORPHINE SULFATE 4 MG: 2 INJECTION, SOLUTION INTRAMUSCULAR; INTRAVENOUS at 09:01

## 2020-01-22 RX ADMIN — POLYETHYLENE GLYCOL 3350 17 G: 17 POWDER, FOR SOLUTION ORAL at 08:01

## 2020-01-22 RX ADMIN — SODIUM CHLORIDE, SODIUM LACTATE, POTASSIUM CHLORIDE, AND CALCIUM CHLORIDE: 600; 310; 30; 20 INJECTION, SOLUTION INTRAVENOUS at 10:01

## 2020-01-22 RX ADMIN — HYDRALAZINE HYDROCHLORIDE 10 MG: 20 INJECTION INTRAMUSCULAR; INTRAVENOUS at 06:01

## 2020-01-22 RX ADMIN — AMLODIPINE BESYLATE 5 MG: 5 TABLET ORAL at 05:01

## 2020-01-22 RX ADMIN — MORPHINE SULFATE 4 MG: 2 INJECTION, SOLUTION INTRAMUSCULAR; INTRAVENOUS at 10:01

## 2020-01-22 RX ADMIN — PANTOPRAZOLE SODIUM 40 MG: 40 INJECTION, POWDER, FOR SOLUTION INTRAVENOUS at 08:01

## 2020-01-22 RX ADMIN — POTASSIUM & SODIUM PHOSPHATES POWDER PACK 280-160-250 MG 2 PACKET: 280-160-250 PACK at 08:01

## 2020-01-22 RX ADMIN — MORPHINE SULFATE 4 MG: 2 INJECTION, SOLUTION INTRAMUSCULAR; INTRAVENOUS at 02:01

## 2020-01-22 RX ADMIN — HYDROCHLOROTHIAZIDE 25 MG: 25 TABLET ORAL at 08:01

## 2020-01-22 RX ADMIN — MORPHINE SULFATE 4 MG: 2 INJECTION, SOLUTION INTRAMUSCULAR; INTRAVENOUS at 12:01

## 2020-01-22 NOTE — SUBJECTIVE & OBJECTIVE
Interval History: Patient today feels better comparing to last night, she was active, smiling and answering questions. NGT placed 1/19 for decompression. Clamped 1/21. Patient feels better with improved N/V. Will advance diet as tolerated today. Biopsy result back with Pancreatic Adenocarcinoma.     Review of Systems   Constitutional: Positive for activity change, appetite change, fatigue and unexpected weight change. Negative for chills, diaphoresis and fever.   HENT: Negative for congestion and trouble swallowing.    Eyes: Negative for pain.   Respiratory: Negative for cough, shortness of breath and wheezing.    Cardiovascular: Negative for chest pain, palpitations and leg swelling.   Gastrointestinal: Positive for constipation, nausea and vomiting. Negative for abdominal distention, abdominal pain, blood in stool and diarrhea.   Genitourinary: Negative for dysuria, enuresis, flank pain, frequency and hematuria.   Musculoskeletal: Positive for back pain.   Skin: Negative for color change.   Psychiatric/Behavioral: Negative for agitation.     Objective:     Vital Signs (Most Recent):  Temp: 98.7 °F (37.1 °C) (01/22/20 0825)  Pulse: 79 (01/22/20 0825)  Resp: 16 (01/22/20 0825)  BP: (!) 180/86 (01/22/20 0825)  SpO2: 96 % (01/22/20 0825) Vital Signs (24h Range):  Temp:  [97.4 °F (36.3 °C)-98.7 °F (37.1 °C)] 98.7 °F (37.1 °C)  Pulse:  [72-87] 79  Resp:  [16-18] 16  SpO2:  [93 %-97 %] 96 %  BP: (167-180)/(83-93) 180/86     Weight: 77.6 kg (171 lb)  Body mass index is 23.85 kg/m².    Intake/Output Summary (Last 24 hours) at 1/22/2020 0856  Last data filed at 1/22/2020 0800  Gross per 24 hour   Intake 5515 ml   Output 2150 ml   Net 3365 ml      Physical Exam   Constitutional: She is oriented to person, place, and time. She appears well-developed and well-nourished.   Patient look tired and in pain and bad acid reflux    HENT:   Head: Normocephalic and atraumatic.   Eyes: Pupils are equal, round, and reactive to light. EOM  are normal.   Neck: Normal range of motion. Neck supple.   Cardiovascular: Normal rate and regular rhythm.   No murmur heard.  Pulmonary/Chest: Effort normal and breath sounds normal.   Abdominal: Soft. Bowel sounds are normal. She exhibits distension. There is no tenderness.   Old midline lower abdominal incision scar from ruptured appendix.     Musculoskeletal: Normal range of motion. She exhibits no edema or deformity.   Neurological: She is alert and oriented to person, place, and time.   Skin: Skin is warm and dry.   Psychiatric:   Depressed mood.       Significant Labs: All pertinent labs within the past 24 hours have been reviewed.    Significant Imaging: I have reviewed and interpreted all pertinent imaging results/findings within the past 24 hours.

## 2020-01-22 NOTE — ANESTHESIA PREPROCEDURE EVALUATION
01/22/2020  Radha Alcazar is a 64 y.o., female with pancreatic mass currently with NG tube in place. Here for below procedure.  Pre-operative evaluation for Procedure(s) (LRB):  EGD (ESOPHAGOGASTRODUODENOSCOPY) (N/A)    Radha Alcazar is a 64 y.o. female     Patient Active Problem List   Diagnosis    Abdominal pain    Hypertension    Intractable nausea and vomiting    Pancreatic mass    Lung consolidation    Pleural effusion    Abnormal CT of the abdomen    Hypokalemia    Transaminitis    Leukocytosis    Normocytic anemia    Depressed mood       Review of patient's allergies indicates:  No Known Allergies    No current facility-administered medications on file prior to encounter.      Current Outpatient Medications on File Prior to Encounter   Medication Sig Dispense Refill    aspirin-acetaminophen-caffeine 250-250-65 mg (EXCEDRIN EXTRA STRENGTH) 250-250-65 mg per tablet Take 2 tablets by mouth daily as needed for Pain.      lisinopril-hydrochlorothiazide (PRINZIDE,ZESTORETIC) 20-12.5 mg per tablet Take 1 tablet by mouth once daily.      ondansetron (ZOFRAN) 8 MG tablet Take 8 mg by mouth every 8 (eight) hours.      pantoprazole (PROTONIX) 40 MG tablet Take 40 mg by mouth once daily.      promethazine (PHENERGAN) 25 MG suppository Place 25 mg rectally every 6 (six) hours as needed for Nausea.      sucralfate (CARAFATE) 1 gram tablet Take 1 g by mouth 4 (four) times daily.         Past Surgical History:   Procedure Laterality Date    APPENDECTOMY      ENDOSCOPIC ULTRASOUND OF UPPER GASTROINTESTINAL TRACT N/A 1/20/2020    Procedure: ULTRASOUND, UPPER GI TRACT, ENDOSCOPIC;  Surgeon: Toni Messer MD;  Location: [address];  Service: Endoscopy;  Laterality: N/A;       Social History     Socioeconomic History    Marital status:      Spouse name: Not on file     Number of children: Not on file    Years of education: Not on file    Highest education level: Not on file   Occupational History    Not on file   Social Needs    Financial resource strain: Not on file    Food insecurity:     Worry: Not on file     Inability: Not on file    Transportation needs:     Medical: Not on file     Non-medical: Not on file   Tobacco Use    Smoking status: Never Smoker    Smokeless tobacco: Never Used   Substance and Sexual Activity    Alcohol use: Never     Frequency: Never    Drug use: Never    Sexual activity: Not on file   Lifestyle    Physical activity:     Days per week: Not on file     Minutes per session: Not on file    Stress: Not on file   Relationships    Social connections:     Talks on phone: Not on file     Gets together: Not on file     Attends Hinduism service: Not on file     Active member of club or organization: Not on file     Attends meetings of clubs or organizations: Not on file     Relationship status: Not on file   Other Topics Concern    Not on file   Social History Narrative    Not on file         CBC:   Recent Labs     01/21/20  0350 01/22/20  0354   WBC 13.07* 12.78*   RBC 3.52* 3.54*   HGB 10.8* 10.8*   HCT 34.0* 33.9*   * 337   MCV 97 96   MCH 30.7 30.5   MCHC 31.8* 31.9*       CMP:   Recent Labs     01/21/20  0350 01/22/20  0354    135*   K 3.2* 3.5   CL 98 97   CO2 27 30*   BUN 13 10   CREATININE 0.9 0.8   GLU 79 133*   MG 1.8 1.9   PHOS 3.3 2.0*   CALCIUM 8.0* 8.1*   ALBUMIN 2.1* 2.1*   PROT 5.6* 5.5*   ALKPHOS 137* 146*   ALT 89* 80*   AST 77* 66*   BILITOT 1.0 0.9       INR  Recent Labs     01/22/20  0908   INR 1.1           Anesthesia Evaluation    I have reviewed the Patient Summary Reports.     I have reviewed the Medications.     Review of Systems  Anesthesia Hx:  No problems with previous Anesthesia    Cardiovascular:   Hypertension    Pulmonary:  Pulmonary Normal    Neurological:  Neurology Normal     Endocrine:  Endocrine Normal        Physical Exam  General:  Well nourished    Airway/Jaw/Neck:  Airway Findings: Mouth Opening: Normal Tongue: Normal  General Airway Assessment: Adult  Improves to II with phonation.  TM Distance: 4 - 6 cm      Dental:  Dental Findings: In tact        Mental Status:  Mental Status Findings:  Cooperative, Alert and Oriented         Anesthesia Plan  Type of Anesthesia, risks & benefits discussed:  Anesthesia Type:  general  Patient's Preference:   Intra-op Monitoring Plan: standard ASA monitors  Intra-op Monitoring Plan Comments:   Post Op Pain Control Plan: per primary service following discharge from PACU and multimodal analgesia  Post Op Pain Control Plan Comments:   Induction:   IV  Beta Blocker:  Patient is not currently on a Beta-Blocker (No further documentation required).       Informed Consent: Patient understands risks and agrees with Anesthesia plan.  Questions answered. Anesthesia consent signed with patient.  ASA Score: 3     Day of Surgery Review of History & Physical:    H&P update referred to the surgeon.         Ready For Surgery From Anesthesia Perspective.

## 2020-01-22 NOTE — NURSING
Educated PT and son on frequent AMB in RM and halls to facilitate healing and keep mobility. PT and son stated understanding. Will cont to manage POC.

## 2020-01-22 NOTE — ASSESSMENT & PLAN NOTE
64 YOF with pancreatic head mass.     - Patient tolerated clamp trial of NG overnight without nausea or vomiting  - Recommend DC NG tube, continue CLD   - Will speak with staff regarding duodenal stent, however patient tolerating CLD at this time

## 2020-01-22 NOTE — PROGRESS NOTES
"[facility] Hospital Medicine  Progress Note    Patient Name: Radha Alcazar  MRN: [id number]  Patient Class: IP- Inpatient   Admission Date: 1/18/2020  Length of Stay: 4 days  Attending Physician: Diana Turcios MD  Primary Care Provider: Hossein Mortensen MD    [facility] Medicine Team: [facility] MED 3 Zena Bolden MD    Subjective:     Principal Problem:Pancreatic mass        HPI:  Ms. Alcazar is a 65 yo F with PMHx of HTN and appendectomy who transferred from [facility] for further evaluation of pancreatic mass be hem/Onc and possible biopsy by EUS.     Initially patient hospitalized in [facility] ([facility]) for persistent vomiting and R shoulder pain. CT scan performed at [facility] and was told she had gallbladder stones and a mass on pancreas and was referred to cancer center. Then she presented to [facility] for same problem. Further evaluation done and CT multiple abnormalities.     Pt stated she had decreased appetite since thanksgiving due to the feeling of "food getting stuck in chest" as well as epigastric pain that radiates to her R shoulder. Symptoms have progressively worsened with decreased ability to tolerate PO. Pt reported a 40lbs unintentional weight loss since Nov 2019. She endorses minimal intake for the last 2 wks, constipation, acid reflux and N/V. Denied chills or night sweat. Denied abdominal pain, or diarrhea. Denied SOB, chest pain, cough or palpitation.   Patient never smoke. She don't drink alcohol. NKDA. Family Hx of breast cancer and colon cancer in her mother. She lives in her house with her granddaughter who has Autism.         Overview/Hospital Course:  Patient admitted for further evaluation for pancreatic mass.   CT abdomen (1/17) revealed multiple abnormalities: Distended stomach, 1st, 2nd and 3rd portions of the duodenum suggestive of an obstructive process.  Ascites in the abdomen and pelvis and abnormal low-density foci along the capsule of the right " lobe of the liver laterally and inferiorly suspicious for carcinomatosis. Abnormal diminished attenuation in the region of the body and tail of the pancreas with fluid adjacent to these areas.  Findings could represent pancreatic neoplasm and/or pancreatitis. Bilateral pleural effusions with airspace consolidation in the lower lobes bilaterally as well as in the right middle lobe.     Patient didn't had BM for couple of days. Concern about bowel obstruction overnight, Xray negative for obstruction. Patient on clear liquid diet and NPO after midnight for biopsy by AES 1/20. NGT placed 1/19 for decompression, drain 2220 cc over 24hrs     Mass biopsy done by AES 1/20: - LA Grade C reflux esophagitis.A medium amount of food (residue) in the stomach. Acquired duodenal stenosis. A mass was identified in the pancreatic head. Tissue was obtained from this exam, and results are pending. However, the endosonographic appearance is consistent with adenocarcinoma. Surgical oncology consulted and per GI, patient NG tube clamped. She tolerated clear diet. Biopsy result is Pancreatic Adenocarcinoma. Patient expressed her wished to continue treatment in [place] where her son lives. Oncology to see her today and discussed treatment options. EGD for stent placement today.     Interval History: Patient today feels better comparing to last night, she was active, smiling and answering questions. NGT placed 1/19 for decompression. Clamped 1/21. Patient feels better with improved N/V. Biopsy result back with Pancreatic Adenocarcinoma. EGD for stent placement today.     Review of Systems   Constitutional: Positive for activity change, appetite change, fatigue and unexpected weight change. Negative for chills, diaphoresis and fever.   HENT: Negative for congestion and trouble swallowing.    Eyes: Negative for pain.   Respiratory: Negative for cough, shortness of breath and wheezing.    Cardiovascular: Negative for chest pain,  palpitations and leg swelling.   Gastrointestinal: Positive for constipation, nausea and vomiting. Negative for abdominal distention, abdominal pain, blood in stool and diarrhea.   Genitourinary: Negative for dysuria, enuresis, flank pain, frequency and hematuria.   Musculoskeletal: Positive for back pain.   Skin: Negative for color change.   Psychiatric/Behavioral: Negative for agitation.     Objective:     Vital Signs (Most Recent):  Temp: 98.7 °F (37.1 °C) (01/22/20 0825)  Pulse: 79 (01/22/20 0825)  Resp: 16 (01/22/20 0825)  BP: (!) 180/86 (01/22/20 0825)  SpO2: 96 % (01/22/20 0825) Vital Signs (24h Range):  Temp:  [97.4 °F (36.3 °C)-98.7 °F (37.1 °C)] 98.7 °F (37.1 °C)  Pulse:  [72-87] 79  Resp:  [16-18] 16  SpO2:  [93 %-97 %] 96 %  BP: (167-180)/(83-93) 180/86     Weight: 77.6 kg (171 lb)  Body mass index is 23.85 kg/m².    Intake/Output Summary (Last 24 hours) at 1/22/2020 0856  Last data filed at 1/22/2020 0800  Gross per 24 hour   Intake 5515 ml   Output 2150 ml   Net 3365 ml      Physical Exam   Constitutional: She is oriented to person, place, and time. She appears well-developed and well-nourished.   Patient look tired and in pain and bad acid reflux    HENT:   Head: Normocephalic and atraumatic.   Eyes: Pupils are equal, round, and reactive to light. EOM are normal.   Neck: Normal range of motion. Neck supple.   Cardiovascular: Normal rate and regular rhythm.   No murmur heard.  Pulmonary/Chest: Effort normal and breath sounds normal.   Abdominal: Soft. Bowel sounds are normal. She exhibits distension. There is no tenderness.   Old midline lower abdominal incision scar from ruptured appendix.     Musculoskeletal: Normal range of motion. She exhibits no edema or deformity.   Neurological: She is alert and oriented to person, place, and time.   Skin: Skin is warm and dry.   Psychiatric:   Depressed mood.       Significant Labs: All pertinent labs within the past 24 hours have been reviewed.    Significant  "Imaging: I have reviewed and interpreted all pertinent imaging results/findings within the past 24 hours.      Assessment/Plan:      * Pancreatic mass  Ms. Alcazar is a 65 yo F with PMHx of HTN and appendectomy who transferred from [facility] for further evaluation of pancreatic mass be hem/Onc and possible biopsy by EUS.    Pt stated she had decreased appetite since thanksgiving due to the feeling of "food getting stuck in chest" as well as epigastric pain that radiates to her R shoulder. Symptoms have progressively worsened with decreased ability to tolerate PO. Pt reported a 40lbs unintentional weight loss since Nov 2019. She endorses minimal intake for the last 2 wks, constipation, acid reflux and N/V. Denied chills or night sweat. Denied abdominal pain, or diarrhea.     CT abdomen with multiple abnormalities: Distended stomach, 1st, 2nd and 3rd portions of the duodenum suggestive of an obstructive process.  Ascites in the abdomen and pelvis and abnormal low-density foci along the capsule of the right lobe of the liver laterally and inferiorly suspicious for carcinomatosis. Abnormal diminished attenuation in the region of the body and tail of the pancreas with fluid adjacent to these areas.  Findings could represent pancreatic neoplasm and/or pancreatitis. Bilateral pleural effusions with airspace consolidation in the lower lobes bilaterally as well as in the right middle lobe.    Giving the presentation and CT finding, concerning for cancer process   AES 1/20: - LA Grade C reflux esophagitis. A medium amount of food (residue) in the stomach. Acquired duodenal stenosis. A mass was identified in the pancreatic head. Tissue was obtained from this exam,the endosonographic appearance is consistent with adenocarcinoma. Result came positive for Adenocarcinoma. Patient expressed her wished to continue treatment in [place] where her son lives.   - Oncology/ surgical oncology consulted, appreciate " recs           Depressed mood  Patient in very depressed mood due to her medical condition. She feels depressed, lost of intrest. No planning of harming herself or others. Difficulty sleeping related to the Acid reflux. She doesn't feels guilty or had dec of concentration.     Plan:   - Will consider starting SSRI once later once definitive diagnoses is establish and no improvement in her condition.       Normocytic anemia  Hb on admission 11.6  Iron WNL. Transferrin and TIBC decrease   Workup showed most likely to be Anemia of chronic disease     Plan:   - Daily CBC         Leukocytosis        Transaminitis    . AST 75 and ALT 90  Normal bili   CT showed: A 1 cm low-density abnormality is noted in the anterior segment of the right lobe of the liver as seen on axial image 24 and series 2.  Levels stabilized           Hypokalemia  - Replete as needed       Abnormal CT of the abdomen  See pancreatic mass       Pleural effusion  CT showed moderate bilateral pleural effusions. Patient Asymptomatic.   May consider thoracentesis and fluid analysis for further diagnosis if clinically worsened      Lung consolidation  CT/Xray showed Bilateral lung consolidation, patient afebrile, denied cough, SOB or chest pain.   + leukocytosis   Vanc and zosyn initiated outside the facility      Ddx: Most likely pneumonia, [place]?   Leukocytosis improved after starting Abx     Plan:   - de-esclate Abx to zosyn, stop date 1/21  - Follow RIP, Respiratory CX, Blood Cx     Intractable nausea and vomiting  US abdomen: Distended fluid-filled stomach in the left upper quadrant.  Most likely related to pancreatic mass/ascites     - S/P NG tube insertion and draining.  - Feeling well after and tolerated diet on 1/21    Hypertension  BP elevated since the admission, most likely from the pain and anxiety     Plan:   - Resume home Lisinopril and hydrochlorothiazide   - Monitor and adjust meds as needed         VTE Risk Mitigation (From  admission, onward)         Ordered     Place sequential compression device  Until discontinued      01/18/20 1805     IP VTE LOW RISK PATIENT  Once      01/18/20 1805                      Zena Bolden MD  Department of Hospital Medicine   [facility]

## 2020-01-22 NOTE — PROGRESS NOTES
[facility]  General Surgery  Progress Note    Subjective:       Post-Op Info:  Procedure(s) (LRB):  EGD (ESOPHAGOGASTRODUODENOSCOPY) (N/A)   Day of Surgery     Interval History: NAEO, VSS. Patient reports no nausea, vomiting, or abdominal distention with NG clamped. She has tolerated small sips of CLD. No new symptoms this morning.     Medications:  Continuous Infusions:   lactated ringers 125 mL/hr at 01/22/20 0346     Scheduled Meds:   amLODIPine  5 mg Oral Daily    [START ON 1/23/2020] hydroCHLOROthiazide  12.5 mg Oral Daily    And    [START ON 1/23/2020] lisinopril  20 mg Oral Daily    [START ON 1/23/2020] pantoprazole  40 mg Oral Daily    polyethylene glycol  17 g Oral Daily     PRN Meds:acetaminophen, bisacodyl, HYDROcodone-acetaminophen, morphine, ondansetron, promethazine (PHENERGAN) IVPB, sodium chloride 0.9%, zolpidem     Review of patient's allergies indicates:  No Known Allergies  Objective:     Vital Signs (Most Recent):  Temp: 98.7 °F (37.1 °C) (01/22/20 0825)  Pulse: 79 (01/22/20 0825)  Resp: 16 (01/22/20 0825)  BP: (!) 180/86 (01/22/20 0825)  SpO2: 96 % (01/22/20 0825) Vital Signs (24h Range):  Temp:  [97.4 °F (36.3 °C)-98.7 °F (37.1 °C)] 98.7 °F (37.1 °C)  Pulse:  [72-87] 79  Resp:  [16-18] 16  SpO2:  [93 %-97 %] 96 %  BP: (167-180)/(83-93) 180/86     Weight: 77.6 kg (171 lb)  Body mass index is 23.85 kg/m².    Intake/Output - Last 3 Shifts       01/20 0700 - 01/21 0659 01/21 0700 - 01/22 0659 01/22 0700 - 01/23 0659    P.O. 0 920 120    I.V. (mL/kg) 2111.7 (27.2) 4475 (57.7)     NG/      IV Piggyback 200      Total Intake(mL/kg) 2611.7 (33.7) 5395 (69.5) 120 (1.5)    Urine (mL/kg/hr) 400 (0.2) 1850 (1) 300 (0.9)    Emesis/NG output 300      Drains 350 800 950    Stool 0      Total Output 1050 2650 1250    Net +1561.7 +2745 -1130           Urine Occurrence 3 x      Stool Occurrence 0 x            Physical Exam   Constitutional: She appears well-developed and  well-nourished.   Cardiovascular: Normal rate and intact distal pulses.   Pulmonary/Chest: Effort normal. No respiratory distress.   Abdominal: Soft. She exhibits no distension. There is no tenderness.   Soft, full abdomen. NG in place, clamped.    Nursing note and vitals reviewed.      Significant Labs:  CBC:   Recent Labs   Lab 01/22/20 0354   WBC 12.78*   RBC 3.54*   HGB 10.8*   HCT 33.9*      MCV 96   MCH 30.5   MCHC 31.9*     BMP:   Recent Labs   Lab 01/22/20 0354   *   *   K 3.5   CL 97   CO2 30*   BUN 10   CREATININE 0.8   CALCIUM 8.1*   MG 1.9     CMP:   Recent Labs   Lab 01/22/20 0354   *   CALCIUM 8.1*   ALBUMIN 2.1*   PROT 5.5*   *   K 3.5   CO2 30*   CL 97   BUN 10   CREATININE 0.8   ALKPHOS 146*   ALT 80*   AST 66*   BILITOT 0.9       Significant Diagnostics:  I have reviewed all pertinent imaging results/findings within the past 24 hours.    Assessment/Plan:     Intractable nausea and vomiting  64 YOF with pancreatic head mass.     - Patient tolerated clamp trial of NG overnight without nausea or vomiting  - Recommend DC NG tube, continue CLD   - Will speak with staff regarding duodenal stent, however patient tolerating CLD at this time          Neisha Chase MD  General Surgery  [facility]

## 2020-01-22 NOTE — PLAN OF CARE
Assisted to room via stretcher. POC initiated. Pt verbalized understanding. No c/o. No distress noted.

## 2020-01-22 NOTE — NURSING
Called IM on call to clarify NG clamp/unclamp status and to report X2 SPB of 175 and 172, no new orders received, will cont to manage POC.

## 2020-01-22 NOTE — NURSING
Per SX/ONC note leave NG clamped unless PT has emesis. No emesis reported this far, will cont to manage POC.

## 2020-01-22 NOTE — ASSESSMENT & PLAN NOTE
CT/Xray showed Bilateral lung consolidation, patient afebrile, denied cough, SOB or chest pain.   + leukocytosis   Vanc and zosyn initiated outside the facility      Ddx: Most likely pneumonia, [place]?   Leukocytosis improved after starting Abx     Plan:   - de-esclate Abx to zosyn, stop date 1/21  - Follow RIP, Respiratory CX, Blood Cx

## 2020-01-22 NOTE — INTERVAL H&P NOTE
The patient has been examined and the H&P has been reviewed:    I concur with the findings and no changes have occurred since H&P was written.    Anesthesia/Surgery risks, benefits and alternative options discussed and understood by patient/family.          Active Hospital Problems    Diagnosis  POA    *Pancreatic mass [K86.89]  Yes    Lung consolidation [J18.1]  Yes    Transaminitis [R74.0]  Yes    Normocytic anemia [D64.9]  Yes    Hypokalemia [E87.6]  Yes    Abnormal CT of the abdomen [R93.5]  Yes    Pleural effusion [J90]  Yes    Depressed mood [F32.9]  Yes    Intractable nausea and vomiting [R11.2]  Yes    Hypertension [I10]  Yes      Resolved Hospital Problems   No resolved problems to display.

## 2020-01-22 NOTE — PLAN OF CARE
POC reviewed with PT and family, stated understanding. AOX4, VSS. PT has flat affect, depressive mood. AMB IND to BR to void, adequate amounts, no BM reported this shift. PIV infusing per order. Pain managed with IV morphine X1 dose this shift. NG clamped per SX/ONC note, no reports of N/V this shift.  PT able to tolerate clear liquids at this time.  NO adverse events this shift, bed low, call light in reach, will cont to manage POC.

## 2020-01-22 NOTE — PLAN OF CARE
POC reviewed with PT and family at BS, specifically pain management, stated understanding, will cont to manage POC.

## 2020-01-22 NOTE — TREATMENT PLAN
AES Treatment Plan  01/22/2020  9:03 AM    Chart reviewed, patient seen, and case discussed with attending.    Patient doing OK this morning and reports that she was able to tolerate a CLD yesterday.     EUS biopsy + for adenocarcinoma therefore results and duodenal stent placement were discussed with patient and son. They are both in agreement with procedure.    Patient NPO for EGD today. She did have apple juice/popsicle between 7:30-8:00 however case discussed with anesthesia and they are OK with proceeding with procedure.    We will continued to follow alongside primary team and provide additional recommendations after the procedure.    Martin Eddy M.D.  Gastroenterology Fellow, PGY-VI  Pager: [phone]  [facility]

## 2020-01-22 NOTE — ASSESSMENT & PLAN NOTE
"Ms. Alcazar is a 65 yo F with PMHx of HTN and appendectomy who transferred from [facility] for further evaluation of pancreatic mass be hem/Onc and possible biopsy by EUS.    Pt stated she had decreased appetite since thanksgiving due to the feeling of "food getting stuck in chest" as well as epigastric pain that radiates to her R shoulder. Symptoms have progressively worsened with decreased ability to tolerate PO. Pt reported a 40lbs unintentional weight loss since Nov 2019. She endorses minimal intake for the last 2 wks, constipation, acid reflux and N/V. Denied chills or night sweat. Denied abdominal pain, or diarrhea.     CT abdomen with multiple abnormalities: Distended stomach, 1st, 2nd and 3rd portions of the duodenum suggestive of an obstructive process.  Ascites in the abdomen and pelvis and abnormal low-density foci along the capsule of the right lobe of the liver laterally and inferiorly suspicious for carcinomatosis. Abnormal diminished attenuation in the region of the body and tail of the pancreas with fluid adjacent to these areas.  Findings could represent pancreatic neoplasm and/or pancreatitis. Bilateral pleural effusions with airspace consolidation in the lower lobes bilaterally as well as in the right middle lobe.    Giving the presentation and CT finding, concerning for cancer process   AES 1/20: - LA Grade C reflux esophagitis. A medium amount of food (residue) in the stomach. Acquired duodenal stenosis. A mass was identified in the pancreatic head. Tissue was obtained from this exam,the endosonographic appearance is consistent with adenocarcinoma. Result came positive for Adenocarcinoma. Patient expressed her wished to continue treatment in [place] where her son lives.   - Oncology/ surgical oncology consulted, appreciate recs         "

## 2020-01-22 NOTE — SUBJECTIVE & OBJECTIVE
Interval History: NAEO, VSS. Patient reports no nausea, vomiting, or abdominal distention with NG clamped. She has tolerated small sips of CLD. No new symptoms this morning.     Medications:  Continuous Infusions:   lactated ringers 125 mL/hr at 01/22/20 0346     Scheduled Meds:   amLODIPine  5 mg Oral Daily    [START ON 1/23/2020] hydroCHLOROthiazide  12.5 mg Oral Daily    And    [START ON 1/23/2020] lisinopril  20 mg Oral Daily    [START ON 1/23/2020] pantoprazole  40 mg Oral Daily    polyethylene glycol  17 g Oral Daily     PRN Meds:acetaminophen, bisacodyl, HYDROcodone-acetaminophen, morphine, ondansetron, promethazine (PHENERGAN) IVPB, sodium chloride 0.9%, zolpidem     Review of patient's allergies indicates:  No Known Allergies  Objective:     Vital Signs (Most Recent):  Temp: 98.7 °F (37.1 °C) (01/22/20 0825)  Pulse: 79 (01/22/20 0825)  Resp: 16 (01/22/20 0825)  BP: (!) 180/86 (01/22/20 0825)  SpO2: 96 % (01/22/20 0825) Vital Signs (24h Range):  Temp:  [97.4 °F (36.3 °C)-98.7 °F (37.1 °C)] 98.7 °F (37.1 °C)  Pulse:  [72-87] 79  Resp:  [16-18] 16  SpO2:  [93 %-97 %] 96 %  BP: (167-180)/(83-93) 180/86     Weight: 77.6 kg (171 lb)  Body mass index is 23.85 kg/m².    Intake/Output - Last 3 Shifts       01/20 0700 - 01/21 0659 01/21 0700 - 01/22 0659 01/22 0700 - 01/23 0659    P.O. 0 920 120    I.V. (mL/kg) 2111.7 (27.2) 4475 (57.7)     NG/      IV Piggyback 200      Total Intake(mL/kg) 2611.7 (33.7) 5395 (69.5) 120 (1.5)    Urine (mL/kg/hr) 400 (0.2) 1850 (1) 300 (0.9)    Emesis/NG output 300      Drains 350 800 950    Stool 0      Total Output 1050 2650 1250    Net +1561.7 +2745 -1130           Urine Occurrence 3 x      Stool Occurrence 0 x            Physical Exam   Constitutional: She appears well-developed and well-nourished.   Cardiovascular: Normal rate and intact distal pulses.   Pulmonary/Chest: Effort normal. No respiratory distress.   Abdominal: Soft. She exhibits no distension. There is no  tenderness.   Soft, full abdomen. NG in place, clamped.    Nursing note and vitals reviewed.      Significant Labs:  CBC:   Recent Labs   Lab 01/22/20 0354   WBC 12.78*   RBC 3.54*   HGB 10.8*   HCT 33.9*      MCV 96   MCH 30.5   MCHC 31.9*     BMP:   Recent Labs   Lab 01/22/20 0354   *   *   K 3.5   CL 97   CO2 30*   BUN 10   CREATININE 0.8   CALCIUM 8.1*   MG 1.9     CMP:   Recent Labs   Lab 01/22/20 0354   *   CALCIUM 8.1*   ALBUMIN 2.1*   PROT 5.5*   *   K 3.5   CO2 30*   CL 97   BUN 10   CREATININE 0.8   ALKPHOS 146*   ALT 80*   AST 66*   BILITOT 0.9       Significant Diagnostics:  I have reviewed all pertinent imaging results/findings within the past 24 hours.

## 2020-01-22 NOTE — TRANSFER OF CARE
"Anesthesia Transfer of Care Note    Patient: Radha Alcazar    Procedure(s) Performed: Procedure(s) (LRB):  EGD (ESOPHAGOGASTRODUODENOSCOPY) (N/A)    Patient location: PACU    Anesthesia Type: general    Transport from OR: Transported from OR on 6-10 L/min O2 by face mask with adequate spontaneous ventilation    Post pain: adequate analgesia    Post assessment: no apparent anesthetic complications    Post vital signs: stable    Level of consciousness: awake, alert and oriented    Nausea/Vomiting: no nausea/vomiting    Complications: none    Transfer of care protocol was followed      Last vitals:   Visit Vitals  BP (!) 159/92 (BP Location: Left arm, Patient Position: Lying)   Pulse 77   Temp 36.6 °C (97.9 °F) (Temporal)   Resp 16   Ht 5' 11" (1.803 m)   Wt 77.6 kg (171 lb)   LMP  (LMP Unknown)   SpO2 99%   Breastfeeding? No   BMI 23.85 kg/m²     "

## 2020-01-22 NOTE — [employer] PATIENT INSTRUCTIONS
Discharge Summary/Instructions after an Endoscopic Procedure  Patient Name: Radha Alcazar  Patient MRN: [id number]  Patient YOB: 1955  Wednesday, January 22, 2020  Cory Servin MD  RESTRICTIONS:  During your procedure today, you received medications for sedation.  These   medications may affect your judgment, balance and coordination.  Therefore,   for 24 hours, you have the following restrictions:   - DO NOT drive a car, operate machinery, make legal/financial decisions,   sign important papers or drink alcohol.    ACTIVITY:  Today: no heavy lifting, straining or running due to procedural   sedation/anesthesia.  The following day: return to full activity including work.  DIET:  Eat and drink normally unless instructed otherwise.     TREATMENT FOR COMMON SIDE EFFECTS:  - Mild abdominal pain, nausea, belching, bloating or excessive gas:  rest,   eat lightly and use a heating pad.  - Sore Throat: treat with throat lozenges and/or gargle with warm salt   water.  - Because air was used during the procedure, expelling large amounts of air   from your rectum or belching is normal.  - If a bowel prep was taken, you may not have a bowel movement for 1-3 days.    This is normal.  SYMPTOMS TO WATCH FOR AND REPORT TO YOUR PHYSICIAN:  1. Abdominal pain or bloating, other than gas cramps.  2. Chest pain.  3. Back pain.  4. Signs of infection such as: chills or fever occurring within 24 hours   after the procedure.  5. Rectal bleeding, which would show as bright red, maroon, or black stools.   (A tablespoon of blood from the rectum is not serious, especially if   hemorrhoids are present.)  6. Vomiting.  7. Weakness or dizziness.  GO DIRECTLY TO THE NEAREST EMERGENCY ROOM IF YOU HAVE ANY OF THE FOLLOWING:      Difficulty breathing              Chills and/or fever over 101 F   Persistent vomiting and/or vomiting blood   Severe abdominal pain   Severe chest pain   Black, tarry stools   Bleeding- more than one  tablespoon   Any other symptom or condition that you feel may need urgent attention  Your doctor recommends these additional instructions:  If any biopsies were taken, your doctors clinic will contact you in 1 to 2   weeks with any results.  - [facility].   - Full liquid diet for 48 hours. May then advance as tolerated to no more   than a mechanical soft diet. Chew all intake thoroughly.  For questions, problems or results please call your physician - Cory Servin MD at Work:  [phone].  [facility], EMERGENCY ROOM PHONE NUMBER: [phone]  IF A COMPLICATION OR EMERGENCY SITUATION ARISES AND YOU ARE UNABLE TO REACH   YOUR PHYSICIAN - GO DIRECTLY TO THE EMERGENCY ROOM.  Cory Servin MD  1/22/2020 2:22:59 PM  This report has been verified and signed electronically.  [employer]

## 2020-01-22 NOTE — NURSING TRANSFER
Nursing Transfer Note      1/22/2020     Transfer To: [facility] From: [facility]    Transfer via stretcher    Transfer with IV pump    Transported by escort    Medicines sent: IVF    Chart send with patient: Yes    Notified: EVELYNE Cervantes    Patient reassessed at: 01/22/2020 0430(date, time)    Upon arrival to floor: patient oriented to room, call bell in reach and bed in lowest position

## 2020-01-22 NOTE — TREATMENT PLAN
AES Treatment Plan  01/22/2020  3:02 PM    EGD with stent placement completed with impression and recs below.    Impression:             - Acquired duodenal stenosis. Prosthesis placed.  - No specimens collected.    Recommendation:        - [facility].  - Full liquid diet for 48 hours. May then advance as tolerated to no more than a mechanical soft diet. Chew all intake thoroughly.  - We will continue to follow alongside primary team, please call back with any additional questions or concerns.    Martin Eddy M.D.  Gastroenterology Fellow, PGY-VI  Pager: [phone]  [facility]

## 2020-01-23 PROBLEM — E43 SEVERE MALNUTRITION: Status: ACTIVE | Noted: 2020-01-23

## 2020-01-23 LAB
ALBUMIN SERPL BCP-MCNC: 2.1 G/DL (ref 3.5–5.2)
ALP SERPL-CCNC: 138 U/L (ref 55–135)
ALT SERPL W/O P-5'-P-CCNC: 78 U/L (ref 10–44)
ANION GAP SERPL CALC-SCNC: 11 MMOL/L (ref 8–16)
AST SERPL-CCNC: 60 U/L (ref 10–40)
BASOPHILS # BLD AUTO: 0.03 K/UL (ref 0–0.2)
BASOPHILS NFR BLD: 0.2 % (ref 0–1.9)
BILIRUB SERPL-MCNC: 0.8 MG/DL (ref 0.1–1)
BUN SERPL-MCNC: 9 MG/DL (ref 8–23)
CALCIUM SERPL-MCNC: 8 MG/DL (ref 8.7–10.5)
CHLORIDE SERPL-SCNC: 98 MMOL/L (ref 95–110)
CO2 SERPL-SCNC: 27 MMOL/L (ref 23–29)
CREAT SERPL-MCNC: 0.7 MG/DL (ref 0.5–1.4)
DIFFERENTIAL METHOD: ABNORMAL
EOSINOPHIL # BLD AUTO: 0.1 K/UL (ref 0–0.5)
EOSINOPHIL NFR BLD: 0.8 % (ref 0–8)
ERYTHROCYTE [DISTWIDTH] IN BLOOD BY AUTOMATED COUNT: 14 % (ref 11.5–14.5)
EST. GFR  (AFRICAN AMERICAN): >60 ML/MIN/1.73 M^2
EST. GFR  (NON AFRICAN AMERICAN): >60 ML/MIN/1.73 M^2
GLUCOSE SERPL-MCNC: 113 MG/DL (ref 70–110)
HCT VFR BLD AUTO: 35.5 % (ref 37–48.5)
HGB BLD-MCNC: 11.3 G/DL (ref 12–16)
IMM GRANULOCYTES # BLD AUTO: 0.1 K/UL (ref 0–0.04)
IMM GRANULOCYTES NFR BLD AUTO: 0.7 % (ref 0–0.5)
LYMPHOCYTES # BLD AUTO: 1.2 K/UL (ref 1–4.8)
LYMPHOCYTES NFR BLD: 8.6 % (ref 18–48)
MAGNESIUM SERPL-MCNC: 1.7 MG/DL (ref 1.6–2.6)
MCH RBC QN AUTO: 30.5 PG (ref 27–31)
MCHC RBC AUTO-ENTMCNC: 31.8 G/DL (ref 32–36)
MCV RBC AUTO: 96 FL (ref 82–98)
MONOCYTES # BLD AUTO: 1.2 K/UL (ref 0.3–1)
MONOCYTES NFR BLD: 8.4 % (ref 4–15)
NEUTROPHILS # BLD AUTO: 11.2 K/UL (ref 1.8–7.7)
NEUTROPHILS NFR BLD: 81.3 % (ref 38–73)
NRBC BLD-RTO: 0 /100 WBC
PHOSPHATE SERPL-MCNC: 2.6 MG/DL (ref 2.7–4.5)
PLATELET # BLD AUTO: 364 K/UL (ref 150–350)
PMV BLD AUTO: 9.9 FL (ref 9.2–12.9)
POTASSIUM SERPL-SCNC: 3.4 MMOL/L (ref 3.5–5.1)
PROT SERPL-MCNC: 5.5 G/DL (ref 6–8.4)
RBC # BLD AUTO: 3.7 M/UL (ref 4–5.4)
SODIUM SERPL-SCNC: 136 MMOL/L (ref 136–145)
WBC # BLD AUTO: 13.72 K/UL (ref 3.9–12.7)

## 2020-01-23 PROCEDURE — 83735 ASSAY OF MAGNESIUM: CPT

## 2020-01-23 PROCEDURE — 84100 ASSAY OF PHOSPHORUS: CPT

## 2020-01-23 PROCEDURE — 25000003 PHARM REV CODE 250: Performed by: STUDENT IN AN ORGANIZED HEALTH CARE EDUCATION/TRAINING PROGRAM

## 2020-01-23 PROCEDURE — 25000003 PHARM REV CODE 250: Performed by: HOSPITALIST

## 2020-01-23 PROCEDURE — 85025 COMPLETE CBC W/AUTO DIFF WBC: CPT

## 2020-01-23 PROCEDURE — 97802 MEDICAL NUTRITION INDIV IN: CPT

## 2020-01-23 PROCEDURE — 20600001 HC STEP DOWN PRIVATE ROOM

## 2020-01-23 PROCEDURE — 63600175 PHARM REV CODE 636 W HCPCS

## 2020-01-23 PROCEDURE — 99231 PR SUBSEQUENT HOSPITAL CARE,LEVL I: ICD-10-PCS | Mod: ,,,

## 2020-01-23 PROCEDURE — 80053 COMPREHEN METABOLIC PANEL: CPT

## 2020-01-23 PROCEDURE — 99231 SBSQ HOSP IP/OBS SF/LOW 25: CPT | Mod: ,,,

## 2020-01-23 PROCEDURE — 36415 COLL VENOUS BLD VENIPUNCTURE: CPT

## 2020-01-23 RX ORDER — POTASSIUM CHLORIDE 20 MEQ/15ML
40 SOLUTION ORAL ONCE
Status: COMPLETED | OUTPATIENT
Start: 2020-01-23 | End: 2020-01-23

## 2020-01-23 RX ORDER — SODIUM,POTASSIUM PHOSPHATES 280-250MG
1 POWDER IN PACKET (EA) ORAL ONCE
Status: COMPLETED | OUTPATIENT
Start: 2020-01-23 | End: 2020-01-23

## 2020-01-23 RX ADMIN — AMLODIPINE BESYLATE 5 MG: 5 TABLET ORAL at 09:01

## 2020-01-23 RX ADMIN — LISINOPRIL 20 MG: 20 TABLET ORAL at 09:01

## 2020-01-23 RX ADMIN — HYDROCHLOROTHIAZIDE 12.5 MG: 12.5 TABLET ORAL at 09:01

## 2020-01-23 RX ADMIN — POLYETHYLENE GLYCOL 3350 17 G: 17 POWDER, FOR SOLUTION ORAL at 09:01

## 2020-01-23 RX ADMIN — POTASSIUM & SODIUM PHOSPHATES POWDER PACK 280-160-250 MG 1 PACKET: 280-160-250 PACK at 09:01

## 2020-01-23 RX ADMIN — PANTOPRAZOLE SODIUM 40 MG: 40 TABLET, DELAYED RELEASE ORAL at 09:01

## 2020-01-23 RX ADMIN — HYDROCODONE BITARTRATE AND ACETAMINOPHEN 1 TABLET: 5; 325 TABLET ORAL at 09:01

## 2020-01-23 RX ADMIN — HYDROCODONE BITARTRATE AND ACETAMINOPHEN 1 TABLET: 5; 325 TABLET ORAL at 08:01

## 2020-01-23 RX ADMIN — POTASSIUM CHLORIDE 40 MEQ: 20 SOLUTION ORAL at 09:01

## 2020-01-23 RX ADMIN — SODIUM CHLORIDE, SODIUM LACTATE, POTASSIUM CHLORIDE, AND CALCIUM CHLORIDE: 600; 310; 30; 20 INJECTION, SOLUTION INTRAVENOUS at 09:01

## 2020-01-23 NOTE — PLAN OF CARE
POC reviewed with PT and family, stated understanding. AOX4, VSS. PT has flat affect, depressive mood. AMB IND to BR to void, adequate amounts, no BM reported this shift. PIV infusing per order. Pain managed with IV morphine X1 dose this shift. no reports of N/V this shift.  PT able to tolerate clear liquids at this time.  NO adverse events this shift, bed low, call light in reach, will cont to manage POC.

## 2020-01-23 NOTE — PROGRESS NOTES
"[facility] Hospital Medicine  Progress Note    Patient Name: Radha Alcazar  MRN: [id number]  Patient Class: IP- Inpatient   Admission Date: 1/18/2020  Length of Stay: 5 days  Attending Physician: Pavan Camarillo MD  Primary Care Provider: Hossein Mortensen MD    [facility] Medicine Team: [facility] HOSP MED 3 Zena Bolden MD    Subjective:     Principal Problem:Pancreatic mass        HPI:  Ms. Alcazar is a 63 yo F with PMHx of HTN and appendectomy who transferred from [facility] for further evaluation of pancreatic mass be hem/Onc and possible biopsy by EUS.     Initially patient hospitalized in [facility] ([facility]) for persistent vomiting and R shoulder pain. CT scan performed at [facility] and was told she had gallbladder stones and a mass on pancreas and was referred to cancer center. Then she presented to [facility] for same problem. Further evaluation done and CT multiple abnormalities.     Pt stated she had decreased appetite since thanksgiving due to the feeling of "food getting stuck in chest" as well as epigastric pain that radiates to her R shoulder. Symptoms have progressively worsened with decreased ability to tolerate PO. Pt reported a 40lbs unintentional weight loss since Nov 2019. She endorses minimal intake for the last 2 wks, constipation, acid reflux and N/V. Denied chills or night sweat. Denied abdominal pain, or diarrhea. Denied SOB, chest pain, cough or palpitation.   Patient never smoke. She don't drink alcohol. NKDA. Family Hx of breast cancer and colon cancer in her mother. She lives in her house with her granddaughter who has Autism.         Overview/Hospital Course:  Patient admitted for further evaluation for pancreatic mass.   CT abdomen (1/17) revealed multiple abnormalities: Distended stomach, 1st, 2nd and 3rd portions of the duodenum suggestive of an obstructive process.  Ascites in the abdomen and pelvis and abnormal low-density foci along the capsule of the right " lobe of the liver laterally and inferiorly suspicious for carcinomatosis. Abnormal diminished attenuation in the region of the body and tail of the pancreas with fluid adjacent to these areas.  Findings could represent pancreatic neoplasm and/or pancreatitis. Bilateral pleural effusions with airspace consolidation in the lower lobes bilaterally as well as in the right middle lobe.     Patient didn't had BM for couple of days. Concern about bowel obstruction overnight, Xray negative for obstruction. Patient on clear liquid diet and NPO after midnight for biopsy by AES 1/20. NGT placed 1/19 for decompression, drain 2220 cc over 24hrs     Mass biopsy done by AES 1/20: - LA Grade C reflux esophagitis.A medium amount of food (residue) in the stomach. Acquired duodenal stenosis. A mass was identified in the pancreatic head. Tissue was obtained from this exam, and results are pending. However, the endosonographic appearance is consistent with adenocarcinoma. Surgical oncology consulted and per GI, patient NG tube clamped. She tolerated clear diet. Biopsy result is Pancreatic Adenocarcinoma. Patient expressed her wished to continue treatment in [place] where her son lives. Oncology to see her today and discussed treatment options. EGD with stent placement 1/22. Patient tolerating CLD, will advance to soft diet tomorrow.     Interval History: Patient today feels better comparing to last night, she was active, smiling and answering questions. NGT removed. EGD with stent placement 1/22. Patient feels better, no N/V. Tolerating CLD.   Review of Systems   Constitutional: Positive for activity change, appetite change, fatigue and unexpected weight change. Negative for chills, diaphoresis and fever.   HENT: Negative for congestion and trouble swallowing.    Eyes: Negative for pain.   Respiratory: Negative for cough, shortness of breath and wheezing.    Cardiovascular: Negative for chest pain, palpitations and leg swelling.    Gastrointestinal: Positive for constipation. Negative for abdominal distention, abdominal pain, blood in stool, diarrhea, nausea and vomiting.   Genitourinary: Negative for dysuria, enuresis, flank pain, frequency and hematuria.   Musculoskeletal: Negative for back pain.   Skin: Negative for color change.   Psychiatric/Behavioral: Negative for agitation.     Objective:     Vital Signs (Most Recent):  Temp: 97.9 °F (36.6 °C) (01/23/20 0811)  Pulse: 89 (01/23/20 0811)  Resp: 16 (01/23/20 0811)  BP: (!) 164/79 (01/23/20 0811)  SpO2: 95 % (01/23/20 0811) Vital Signs (24h Range):  Temp:  [97.9 °F (36.6 °C)-98.8 °F (37.1 °C)] 97.9 °F (36.6 °C)  Pulse:  [67-92] 89  Resp:  [12-18] 16  SpO2:  [93 %-100 %] 95 %  BP: (132-192)/(63-92) 164/79     Weight: 77.6 kg (171 lb)  Body mass index is 23.85 kg/m².    Intake/Output Summary (Last 24 hours) at 1/23/2020 0826  Last data filed at 1/22/2020 1803  Gross per 24 hour   Intake 1931.25 ml   Output 1100 ml   Net 831.25 ml      Physical Exam   Constitutional: She is oriented to person, place, and time. She appears well-developed and well-nourished.   Patient look tired and in pain and bad acid reflux    HENT:   Head: Normocephalic and atraumatic.   Eyes: Pupils are equal, round, and reactive to light. EOM are normal.   Neck: Normal range of motion. Neck supple.   Cardiovascular: Normal rate and regular rhythm.   No murmur heard.  Pulmonary/Chest: Effort normal and breath sounds normal.   Abdominal: Soft. Bowel sounds are normal. She exhibits no distension. There is no tenderness.   Old midline lower abdominal incision scar from ruptured appendix.     Musculoskeletal: Normal range of motion. She exhibits no edema or deformity.   Neurological: She is alert and oriented to person, place, and time.   Skin: Skin is warm and dry.   Psychiatric:   Depressed mood.       Significant Labs: All pertinent labs within the past 24 hours have been reviewed.    Significant Imaging: I have reviewed  "and interpreted all pertinent imaging results/findings within the past 24 hours.      Assessment/Plan:      * Pancreatic mass  Ms. Alcazar is a 63 yo F with PMHx of HTN and appendectomy who transferred from [facility] for further evaluation of pancreatic mass be hem/Onc and possible biopsy by EUS.    Pt stated she had decreased appetite since thanksgiving due to the feeling of "food getting stuck in chest" as well as epigastric pain that radiates to her R shoulder. Symptoms have progressively worsened with decreased ability to tolerate PO. Pt reported a 40lbs unintentional weight loss since Nov 2019. She endorses minimal intake for the last 2 wks, constipation, acid reflux and N/V. Denied chills or night sweat. Denied abdominal pain, or diarrhea.     CT abdomen with multiple abnormalities: Distended stomach, 1st, 2nd and 3rd portions of the duodenum suggestive of an obstructive process.  Ascites in the abdomen and pelvis and abnormal low-density foci along the capsule of the right lobe of the liver laterally and inferiorly suspicious for carcinomatosis. Abnormal diminished attenuation in the region of the body and tail of the pancreas with fluid adjacent to these areas.  Findings could represent pancreatic neoplasm and/or pancreatitis. Bilateral pleural effusions with airspace consolidation in the lower lobes bilaterally as well as in the right middle lobe.    Giving the presentation and CT finding, concerning for cancer process   AES 1/20: - LA Grade C reflux esophagitis. A medium amount of food (residue) in the stomach. Acquired duodenal stenosis. A mass was identified in the pancreatic head. Tissue was obtained from this exam,the endosonographic appearance is consistent with adenocarcinoma. Result came positive for Adenocarcinoma. Patient expressed her wished to continue treatment in [place] where her son lives.   - s/p EGD with stent placement  - Oncology/ surgical oncology consulted, " appreciate recs           Depressed mood  Patient in very depressed mood due to her medical condition. She feels depressed, lost of intrest. No planning of harming herself or others. Difficulty sleeping related to the Acid reflux. She doesn't feels guilty or had dec of concentration.     Plan:   - Will consider starting SSRI once later once definitive diagnoses is establish and no improvement in her condition.       Normocytic anemia  Hb on admission 11.6  Iron WNL. Transferrin and TIBC decrease   Workup showed most likely to be Anemia of chronic disease     Plan:   - Daily CBC         Leukocytosis        Transaminitis    . AST 75 and ALT 90  Normal bili   CT showed: A 1 cm low-density abnormality is noted in the anterior segment of the right lobe of the liver as seen on axial image 24 and series 2.  Levels stabilized           Hypokalemia  - Replete as needed       Abnormal CT of the abdomen  See pancreatic mass       Pleural effusion  CT showed moderate bilateral pleural effusions. Patient Asymptomatic.   May consider thoracentesis and fluid analysis for further diagnosis if clinically worsened      Lung consolidation  CT/Xray showed Bilateral lung consolidation, patient afebrile, denied cough, SOB or chest pain.   + leukocytosis   Vanc and zosyn initiated outside the facility      Ddx: Most likely pneumonia, [place]?   Leukocytosis improved after starting Abx     Plan:   - de-esclate Abx to zosyn, stop date 1/21      Intractable nausea and vomiting  US abdomen: Distended fluid-filled stomach in the left upper quadrant.  Most likely related to pancreatic mass/ascites     - NGT removed.   - s/p EGD with stent placement.   - tolerating CLD    Hypertension  BP elevated since the admission, most likely from the pain and anxiety     Plan:   - Resume home Lisinopril and hydrochlorothiazide   - Added Amlodipine  - Monitor and adjust meds as needed         VTE Risk Mitigation (From admission, onward)         Ordered      Place sequential compression device  Until discontinued      01/18/20 1805     IP VTE LOW RISK PATIENT  Once      01/18/20 1805                      Zena Bolden MD  Department of Hospital Medicine   [facility]

## 2020-01-23 NOTE — ASSESSMENT & PLAN NOTE
CT/Xray showed Bilateral lung consolidation, patient afebrile, denied cough, SOB or chest pain.   + leukocytosis   Vanc and zosyn initiated outside the facility      Ddx: Most likely pneumonia, [place]?   Leukocytosis improved after starting Abx     Plan:   - de-esclate Abx to zosyn, stop date 1/21

## 2020-01-23 NOTE — CONSULTS
"  [facility]  Adult Nutrition  Consult Note    SUMMARY     Recommendations    Pt meets severe malnutrition criteria 2/2 prolonged inadequate energy intake, significant wt loss, and muscle/fat wasting.     1. Continue Full Liquid diet and advance as tolerated.   2. Add Boost Plus (vanilla) with meals to aid in caloric intake.   3. RD following.     Goals: consume >50% of all meals by RD follow-up  Nutrition Goal Status: new  Communication of RD Recs: (POC)    Reason for Assessment    Reason For Assessment: consult  Diagnosis: (Pancreatic mass)  Relevant Medical History: HTN   Interdisciplinary Rounds: attended  General Information Comments: POD1 EGD with duodenal stent placement. Pt resting in bed with family member at bedside. Pt reports okay appetite, but does not love the food. Would like Boost Plus with meals. Denies N/V/D/C. Pt reports decreased appetite with wt loss PTA. Pt reports eating cereal and Ensure at home. UBW 200lbs 6 months ago per pt. NFPE completed. Pt with mild-moderate muscle/fat wasting. Pt meets severe malnutrition criteria at this time with current information. Dicussed mechanical soft diet handout with pt and family member. All questions and concerns answered. Pt and family member verbalized understanding.  Nutrition Discharge Planning: adequate po intake    Nutrition Risk Screen    Nutrition Risk Screen: no indicators present    Nutrition/Diet History    Spiritual, Cultural Beliefs, Gnosticist Practices, Values that Affect Care: no  Factors Affecting Nutritional Intake: (food preferences, full liquids)    Anthropometrics    Temp: 97.9 °F (36.6 °C)  Height Method: Stated  Height: 5' 11" (180.3 cm)  Height (inches): 71 in  Weight Method: Bed Scale  Weight: 77.6 kg (171 lb)  Weight (lb): 171 lb  Ideal Body Weight (IBW), Female: 155 lb  % Ideal Body Weight, Female (lb): 110.32 %  BMI (Calculated): 23.9  BMI Grade: 18.5-24.9 - normal  Weight Loss: unintentional  Usual Body " Weight (UBW), k.9 kg(per pt 6 months ago)  % Usual Body Weight: 85.51  % Weight Change From Usual Weight: -14.67 %     Lab/Procedures/Meds    Pertinent Labs Reviewed: reviewed  Pertinent Medications Reviewed: reviewed  Pertinent Medications Comments: amlodipine, lisinopril, pantoprazole, lactated ringers    Estimated/Assessed Needs    Weight Used For Calorie Calculations: 77.6 kg (171 lb 1.2 oz)  Energy Calorie Requirements (kcal): 1940 kcal/day  Energy Need Method: Kcal/kg(25)  Protein Requirements: 78-93 gm/day(1.0-1.2 gm/kg)  Weight Used For Protein Calculations: 77.6 kg (171 lb 1.2 oz)  Fluid Requirements (mL): 1 mL/kcal or per MD     RDA Method (mL):      Nutrition Prescription Ordered    Current Diet Order: Full Liquids    Evaluation of Received Nutrient/Fluid Intake    Comments: LBM   Tolerance: tolerating  % Intake of Estimated Energy Needs: Other: 50%  % Meal Intake: Other: 50%    Nutrition Risk    Level of Risk/Frequency of Follow-up: (f/u 1 x wk)     Assessment and Plan    Severe malnutrition  Nutrition Problem:  Severe Protein-Calorie Malnutrition  Malnutrition in the context of Chronic Illness/Injury    Related to (etiology):  Inadequate Energy Intake    Signs and Symptoms (as evidenced by):  Energy Intake: <75% of estimated energy requirement for > 3 months  Body Fat Depletion: moderate depletion of triceps   Muscle Mass Depletion: mild and moderate depletion of temples, clavicle region and lower extremities   Weight Loss: 15% x 6 months per pt     Interventions(treatment strategy):  Collaboration of nutrition care with other providers    Nutrition Diagnosis Status:  New             Monitor and Evaluation    Food and Nutrient Intake: energy intake, food and beverage intake  Food and Nutrient Adminstration: diet order  Physical Activity and Function: nutrition-related ADLs and IADLs  Anthropometric Measurements: weight, weight change, body mass index  Biochemical Data, Medical Tests and  Procedures: electrolyte and renal panel, gastrointestinal profile, glucose/endocrine profile, inflammatory profile, lipid profile  Nutrition-Focused Physical Findings: overall appearance     Malnutrition Assessment  Malnutrition Type: chronic illness          Weight Loss (Malnutrition): (15% x 6 months per pt)  Energy Intake (Malnutrition): less than 75% for greater than or equal to 3 months   Upper Arm Region (Subcutaneous Fat Loss): moderate depletion   Danielson Region (Muscle Loss): mild depletion  Clavicle Bone Region (Muscle Loss): moderate depletion  Clavicle and Acromion Bone Region (Muscle Loss): (mild-moderate)  Anterior Thigh Region (Muscle Loss): mild depletion  Posterior Calf Region (Muscle Loss): moderate depletion                 Nutrition Follow-Up    RD Follow-up?: Yes

## 2020-01-23 NOTE — ANESTHESIA POSTPROCEDURE EVALUATION
Anesthesia Post Evaluation    Patient: Radha Alcazar    Procedure(s) Performed: Procedure(s) (LRB):  EGD (ESOPHAGOGASTRODUODENOSCOPY) (N/A)    Final Anesthesia Type: general    Patient location during evaluation: PACU  Patient participation: Yes- Able to Participate  Level of consciousness: awake and alert  Post-procedure vital signs: reviewed and stable  Pain management: adequate  Airway patency: patent    PONV status at discharge: No PONV  Anesthetic complications: no      Cardiovascular status: stable  Respiratory status: unassisted and spontaneous ventilation  Hydration status: euvolemic  Follow-up not needed.          Vitals Value Taken Time   /79 1/23/2020  8:11 AM   Temp 36.6 °C (97.9 °F) 1/23/2020  8:11 AM   Pulse 89 1/23/2020  8:11 AM   Resp 16 1/23/2020  8:11 AM   SpO2 95 % 1/23/2020  8:11 AM         No case tracking events are documented in the log.      Pain/Hiram Score: Pain Rating Prior to Med Admin: 5 (1/23/2020  9:40 AM)  Hiram Score: 9 (1/22/2020  2:05 PM)

## 2020-01-23 NOTE — ASSESSMENT & PLAN NOTE
"Ms. Alcazar is a 65 yo F with PMHx of HTN and appendectomy who transferred from [facility] for further evaluation of pancreatic mass be hem/Onc and possible biopsy by EUS.    Pt stated she had decreased appetite since thanksgiving due to the feeling of "food getting stuck in chest" as well as epigastric pain that radiates to her R shoulder. Symptoms have progressively worsened with decreased ability to tolerate PO. Pt reported a 40lbs unintentional weight loss since Nov 2019. She endorses minimal intake for the last 2 wks, constipation, acid reflux and N/V. Denied chills or night sweat. Denied abdominal pain, or diarrhea.     CT abdomen with multiple abnormalities: Distended stomach, 1st, 2nd and 3rd portions of the duodenum suggestive of an obstructive process.  Ascites in the abdomen and pelvis and abnormal low-density foci along the capsule of the right lobe of the liver laterally and inferiorly suspicious for carcinomatosis. Abnormal diminished attenuation in the region of the body and tail of the pancreas with fluid adjacent to these areas.  Findings could represent pancreatic neoplasm and/or pancreatitis. Bilateral pleural effusions with airspace consolidation in the lower lobes bilaterally as well as in the right middle lobe.    Giving the presentation and CT finding, concerning for cancer process   AES 1/20: - LA Grade C reflux esophagitis. A medium amount of food (residue) in the stomach. Acquired duodenal stenosis. A mass was identified in the pancreatic head. Tissue was obtained from this exam,the endosonographic appearance is consistent with adenocarcinoma. Result came positive for Adenocarcinoma. Patient expressed her wished to continue treatment in [place] where her son lives.   - s/p EGD with stent placement  - Oncology/ surgical oncology consulted, appreciate recs         "

## 2020-01-23 NOTE — TREATMENT PLAN
AES Treatment Plan  01/23/2020  8:56 AM    Chart reviewed, patient seen, and case discussed with attending.    Patient is s/p EGD with duodenal stent placement yesterday.This morning reports feeling good with no nausea, emesis or abdominal pain. From our perspective she should complete 48 hours of a full liquid diet and then advance to NO MORE than a mechanical soft diet.     We thank you for this consultation, we will sign off at this time, please call back with any additional questions or concerns.    Maritn Eddy M.D.  Gastroenterology Fellow, PGY-VI  Pager: [phone]  [facility]

## 2020-01-23 NOTE — SUBJECTIVE & OBJECTIVE
Interval History: Patient today feels better comparing to last night, she was active, smiling and answering questions. NGT removed. EGD with stent placement 1/22. Patient feels better, no N/V. Tolerating CLD.   Review of Systems   Constitutional: Positive for activity change, appetite change, fatigue and unexpected weight change. Negative for chills, diaphoresis and fever.   HENT: Negative for congestion and trouble swallowing.    Eyes: Negative for pain.   Respiratory: Negative for cough, shortness of breath and wheezing.    Cardiovascular: Negative for chest pain, palpitations and leg swelling.   Gastrointestinal: Positive for constipation. Negative for abdominal distention, abdominal pain, blood in stool, diarrhea, nausea and vomiting.   Genitourinary: Negative for dysuria, enuresis, flank pain, frequency and hematuria.   Musculoskeletal: Negative for back pain.   Skin: Negative for color change.   Psychiatric/Behavioral: Negative for agitation.     Objective:     Vital Signs (Most Recent):  Temp: 97.9 °F (36.6 °C) (01/23/20 0811)  Pulse: 89 (01/23/20 0811)  Resp: 16 (01/23/20 0811)  BP: (!) 164/79 (01/23/20 0811)  SpO2: 95 % (01/23/20 0811) Vital Signs (24h Range):  Temp:  [97.9 °F (36.6 °C)-98.8 °F (37.1 °C)] 97.9 °F (36.6 °C)  Pulse:  [67-92] 89  Resp:  [12-18] 16  SpO2:  [93 %-100 %] 95 %  BP: (132-192)/(63-92) 164/79     Weight: 77.6 kg (171 lb)  Body mass index is 23.85 kg/m².    Intake/Output Summary (Last 24 hours) at 1/23/2020 0826  Last data filed at 1/22/2020 1803  Gross per 24 hour   Intake 1931.25 ml   Output 1100 ml   Net 831.25 ml      Physical Exam   Constitutional: She is oriented to person, place, and time. She appears well-developed and well-nourished.   Patient look tired and in pain and bad acid reflux    HENT:   Head: Normocephalic and atraumatic.   Eyes: Pupils are equal, round, and reactive to light. EOM are normal.   Neck: Normal range of motion. Neck supple.   Cardiovascular: Normal rate  and regular rhythm.   No murmur heard.  Pulmonary/Chest: Effort normal and breath sounds normal.   Abdominal: Soft. Bowel sounds are normal. She exhibits no distension. There is no tenderness.   Old midline lower abdominal incision scar from ruptured appendix.     Musculoskeletal: Normal range of motion. She exhibits no edema or deformity.   Neurological: She is alert and oriented to person, place, and time.   Skin: Skin is warm and dry.   Psychiatric:   Depressed mood.       Significant Labs: All pertinent labs within the past 24 hours have been reviewed.    Significant Imaging: I have reviewed and interpreted all pertinent imaging results/findings within the past 24 hours.

## 2020-01-23 NOTE — PHARMACY MED REC
"Admission Medication Reconciliation - Pharmacy Consult Note    The home medication history was taken by Dorcas Aguilar, Pharmacy Technician.  Based on information gathered and subsequent review by the clinical pharmacist, the items below may need attention.    You may go to "Admission" then "Reconcile Home Medications" tabs to review and/or act upon these items.      No issues noted with the medication reconciliation.      Please address this information as you see fit.  Feel free to contact us if you have any questions or require assistance.  Shamika Cast  EXT [phone]   .    .          "

## 2020-01-23 NOTE — ANESTHESIA POSTPROCEDURE EVALUATION
Anesthesia Post Evaluation    Patient: Radha Alcazar    Procedure(s) Performed: Procedure(s) (LRB):  ULTRASOUND, UPPER GI TRACT, ENDOSCOPIC (N/A)    Final Anesthesia Type: general    Patient location during evaluation: PACU  Patient participation: Yes- Able to Participate  Level of consciousness: awake and alert and oriented  Post-procedure vital signs: reviewed and stable  Pain management: adequate  Airway patency: patent    PONV status at discharge: No PONV  Anesthetic complications: no      Cardiovascular status: hemodynamically stable  Respiratory status: unassisted, spontaneous ventilation and room air  Hydration status: euvolemic  Follow-up not needed.          Vitals Value Taken Time   /73 1/23/2020  4:14 AM   Temp 36.8 °C (98.2 °F) 1/23/2020  4:14 AM   Pulse 85 1/23/2020  4:14 AM   Resp 16 1/23/2020  4:14 AM   SpO2 96 % 1/23/2020  4:14 AM         No case tracking events are documented in the log.      Pain/Hiram Score: Pain Rating Prior to Med Admin: 6 (1/22/2020 10:29 PM)  Hiram Score: 9 (1/22/2020  2:05 PM)

## 2020-01-23 NOTE — PHYSICIAN QUERY
"PT Name: Radha Alcazar  MR #: [id number]    Physician Query Form - Pneumonia Clarification   Stephany Otto RN, CCDS  Desk # [phone]; [facility] # [phone] [email or web address]    This form is a permanent document in the medical record.    Query Date:  January 23, 2020    By submitting this query, we are merely seeking further clarification of documentation. Please utilize your independent clinical judgment when addressing the question(s) below.    The Medical record contains the following:   Indicators   Supporting Clinical Findings Location in Medical Record   x "Pneumonia" documented Ddx: Most likely pneumonia, Yuliya?    Does have leukocytosis with bilateral pleural effusion.   Concern for possible pneumonia, started on antibiotics   Hosp Pn 1/23    Heme/Onc CN 1/20     x Chest X-Ray: CT showed moderate bilateral pleural effusions  CT/Xray showed Bilateral lung consolidation    CT scan reviewed - invasion into the portal vein with concern for possible carcinomatosis with minimal ascites and multiple liver lesions consistent with Stage IV disease   There are liver lesions that could possibly undergo biopsy if tissue needed   Hosp Pn 1/23        Heme Onc CN 1/20      PaO2    PaCO2     O2 sat      Cultures      x Treatment  Plan:  - de-esclate Abx to zosyn, stop date 1/21  Vanc and zosyn initiated outside the facility     medical oncology, they are comfortable treating her as stage IV pancreatic cancer without additional peritoneal investigation which I think is appropriate.    Hosp Pn 1/23          Heme/Onc CN 1/20      Supplemental O2     x Other Pleural effusion  Patient Asymptomatic.  May consider thoracentesis and fluid analysis for further diagnosis if clinically worsened    Lung consolidation  patient afebrile, denied cough, SOB or chest pain.  + leukocytosis  Leukocytosis improved after starting Abx  Vanc and zosyn initiated outside the facility   Hosp Pn 1/23         Provider, please specify type of " pneumonia.    [   ] Bacterial pneumonia, not further specified   [ x] Pneumonia ruled out   [   ] Other type of pneumonia (please specify): __________   [  ] Clinically undetermined         Please document in your progress notes daily for the duration of treatment, until resolved, and include in your discharge summary.    .

## 2020-01-23 NOTE — PLAN OF CARE
Problem: Malnutrition  Goal: Improved Nutritional Intake  Outcome: Ongoing, Progressing     Problem: Oral Intake Inadequate  Goal: Improved Oral Intake  Outcome: Ongoing, Progressing       Recommendations    Pt meets severe malnutrition criteria 2/2 prolonged inadequate energy intake, significant wt loss, and muscle/fat wasting.     1. Continue Full Liquid diet and advance as tolerated.   2. Add Boost Plus (vanilla) with meals to aid in caloric intake.   3. RD following.     Goals: consume >50% of all meals by RD follow-up  Nutrition Goal Status: new

## 2020-01-23 NOTE — CARE UPDATE
I was asked by the primary team to discuss treatment options with the patient and her family.    This is a 63 y/o F with newly diagnosed stage IV pancreatic adenocarcinoma.     Answered family's questions about treatment options and prognosis.    After listening to all her options the patient made it clear that she would like to go to [place] as soon as she is stable for discharge to be with her son. The patient's son is going to talk to the [facility] about transferring Medicaid insurances to [place].      Please page oncology with any questions.     Yann Fragoso MD  Clinical Fellow  Hematology and Medical Oncology.  01/23/2020

## 2020-01-23 NOTE — ASSESSMENT & PLAN NOTE
US abdomen: Distended fluid-filled stomach in the left upper quadrant.  Most likely related to pancreatic mass/ascites     - NGT removed.   - s/p EGD with stent placement.   - tolerating CLD

## 2020-01-23 NOTE — ASSESSMENT & PLAN NOTE
BP elevated since the admission, most likely from the pain and anxiety     Plan:   - Resume home Lisinopril and hydrochlorothiazide   - Added Amlodipine  - Monitor and adjust meds as needed

## 2020-01-23 NOTE — ASSESSMENT & PLAN NOTE
Nutrition Problem:  Severe Protein-Calorie Malnutrition  Malnutrition in the context of Chronic Illness/Injury    Related to (etiology):  Inadequate Energy Intake    Signs and Symptoms (as evidenced by):  Energy Intake: <75% of estimated energy requirement for > 3 months  Body Fat Depletion: moderate depletion of triceps   Muscle Mass Depletion: mild and moderate depletion of temples, clavicle region and lower extremities   Weight Loss: 15% x 6 months per pt     Interventions(treatment strategy):  Collaboration of nutrition care with other providers    Nutrition Diagnosis Status:  New

## 2020-01-24 LAB
ALBUMIN SERPL BCP-MCNC: 2.1 G/DL (ref 3.5–5.2)
ALP SERPL-CCNC: 173 U/L (ref 55–135)
ALT SERPL W/O P-5'-P-CCNC: 80 U/L (ref 10–44)
ANION GAP SERPL CALC-SCNC: 7 MMOL/L (ref 8–16)
AST SERPL-CCNC: 77 U/L (ref 10–40)
BASOPHILS # BLD AUTO: 0.05 K/UL (ref 0–0.2)
BASOPHILS NFR BLD: 0.4 % (ref 0–1.9)
BILIRUB SERPL-MCNC: 0.6 MG/DL (ref 0.1–1)
BUN SERPL-MCNC: 7 MG/DL (ref 8–23)
CALCIUM SERPL-MCNC: 8 MG/DL (ref 8.7–10.5)
CHLORIDE SERPL-SCNC: 99 MMOL/L (ref 95–110)
CO2 SERPL-SCNC: 27 MMOL/L (ref 23–29)
CREAT SERPL-MCNC: 0.7 MG/DL (ref 0.5–1.4)
DIFFERENTIAL METHOD: ABNORMAL
EOSINOPHIL # BLD AUTO: 0.2 K/UL (ref 0–0.5)
EOSINOPHIL NFR BLD: 1.9 % (ref 0–8)
ERYTHROCYTE [DISTWIDTH] IN BLOOD BY AUTOMATED COUNT: 14.3 % (ref 11.5–14.5)
EST. GFR  (AFRICAN AMERICAN): >60 ML/MIN/1.73 M^2
EST. GFR  (NON AFRICAN AMERICAN): >60 ML/MIN/1.73 M^2
GLUCOSE SERPL-MCNC: 140 MG/DL (ref 70–110)
HCT VFR BLD AUTO: 34.6 % (ref 37–48.5)
HGB BLD-MCNC: 11 G/DL (ref 12–16)
IMM GRANULOCYTES # BLD AUTO: 0.07 K/UL (ref 0–0.04)
IMM GRANULOCYTES NFR BLD AUTO: 0.6 % (ref 0–0.5)
LYMPHOCYTES # BLD AUTO: 1.4 K/UL (ref 1–4.8)
LYMPHOCYTES NFR BLD: 11.7 % (ref 18–48)
MAGNESIUM SERPL-MCNC: 1.8 MG/DL (ref 1.6–2.6)
MCH RBC QN AUTO: 30.6 PG (ref 27–31)
MCHC RBC AUTO-ENTMCNC: 31.8 G/DL (ref 32–36)
MCV RBC AUTO: 96 FL (ref 82–98)
MONOCYTES # BLD AUTO: 1.2 K/UL (ref 0.3–1)
MONOCYTES NFR BLD: 10.3 % (ref 4–15)
NEUTROPHILS # BLD AUTO: 8.8 K/UL (ref 1.8–7.7)
NEUTROPHILS NFR BLD: 75.1 % (ref 38–73)
NRBC BLD-RTO: 0 /100 WBC
PHOSPHATE SERPL-MCNC: 2.2 MG/DL (ref 2.7–4.5)
PLATELET # BLD AUTO: 384 K/UL (ref 150–350)
PMV BLD AUTO: 9.7 FL (ref 9.2–12.9)
POTASSIUM SERPL-SCNC: 3.7 MMOL/L (ref 3.5–5.1)
PROT SERPL-MCNC: 5.5 G/DL (ref 6–8.4)
RBC # BLD AUTO: 3.59 M/UL (ref 4–5.4)
SODIUM SERPL-SCNC: 133 MMOL/L (ref 136–145)
WBC # BLD AUTO: 11.66 K/UL (ref 3.9–12.7)

## 2020-01-24 PROCEDURE — 36415 COLL VENOUS BLD VENIPUNCTURE: CPT

## 2020-01-24 PROCEDURE — 25000003 PHARM REV CODE 250: Performed by: STUDENT IN AN ORGANIZED HEALTH CARE EDUCATION/TRAINING PROGRAM

## 2020-01-24 PROCEDURE — 83735 ASSAY OF MAGNESIUM: CPT

## 2020-01-24 PROCEDURE — 84100 ASSAY OF PHOSPHORUS: CPT

## 2020-01-24 PROCEDURE — 25000003 PHARM REV CODE 250: Performed by: HOSPITALIST

## 2020-01-24 PROCEDURE — 85025 COMPLETE CBC W/AUTO DIFF WBC: CPT

## 2020-01-24 PROCEDURE — 99231 PR SUBSEQUENT HOSPITAL CARE,LEVL I: ICD-10-PCS | Mod: ,,,

## 2020-01-24 PROCEDURE — 20600001 HC STEP DOWN PRIVATE ROOM

## 2020-01-24 PROCEDURE — 80053 COMPREHEN METABOLIC PANEL: CPT

## 2020-01-24 PROCEDURE — 99231 SBSQ HOSP IP/OBS SF/LOW 25: CPT | Mod: ,,,

## 2020-01-24 RX ORDER — SODIUM,POTASSIUM PHOSPHATES 280-250MG
2 POWDER IN PACKET (EA) ORAL ONCE
Status: COMPLETED | OUTPATIENT
Start: 2020-01-24 | End: 2020-01-24

## 2020-01-24 RX ADMIN — POTASSIUM & SODIUM PHOSPHATES POWDER PACK 280-160-250 MG 2 PACKET: 280-160-250 PACK at 08:01

## 2020-01-24 RX ADMIN — HYDROCODONE BITARTRATE AND ACETAMINOPHEN 1 TABLET: 5; 325 TABLET ORAL at 08:01

## 2020-01-24 RX ADMIN — HYDROCODONE BITARTRATE AND ACETAMINOPHEN 1 TABLET: 5; 325 TABLET ORAL at 04:01

## 2020-01-24 RX ADMIN — HYDROCHLOROTHIAZIDE 12.5 MG: 12.5 TABLET ORAL at 08:01

## 2020-01-24 RX ADMIN — AMLODIPINE BESYLATE 5 MG: 5 TABLET ORAL at 08:01

## 2020-01-24 RX ADMIN — LISINOPRIL 20 MG: 20 TABLET ORAL at 08:01

## 2020-01-24 RX ADMIN — PANTOPRAZOLE SODIUM 40 MG: 40 TABLET, DELAYED RELEASE ORAL at 08:01

## 2020-01-24 RX ADMIN — ZOLPIDEM TARTRATE 5 MG: 5 TABLET ORAL at 09:01

## 2020-01-24 RX ADMIN — ZOLPIDEM TARTRATE 5 MG: 5 TABLET ORAL at 12:01

## 2020-01-24 NOTE — ASSESSMENT & PLAN NOTE
CT/Xray showed Bilateral lung consolidation, patient afebrile, denied cough, SOB or chest pain.   + leukocytosis   Vanc and zosyn initiated outside the facility      Ddx: Most likely pneumonia, Loyal?   Leukocytosis improved after starting Abx     Plan:   - de-esclate Abx to zosyn, stop date 1/21

## 2020-01-24 NOTE — PLAN OF CARE
Plan of care reviewed with patient and family who demonstrate understanding. Pt. AAOx4, VSS. Mood appears to be flat. Pt. Ambulates independently to the bathroom. Urinating adequate amounts and have BM x2. Pt. Reports BM becoming more formed. IVF infusing per MAR. Moderate pain relieved with prn pain medication. Patient is tolerating full liquid diet with no complaints of nausea or vomiting. Bed in low position, bed rails x2. Call light within reach, family at bedside frequent monitoring continued.

## 2020-01-24 NOTE — ASSESSMENT & PLAN NOTE
"Ms. Alcazar is a 63 yo F with PMHx of HTN and appendectomy who transferred from [facility] for further evaluation of pancreatic mass be hem/Onc and possible biopsy by EUS.    Pt stated she had decreased appetite since thanksgiving due to the feeling of "food getting stuck in chest" as well as epigastric pain that radiates to her R shoulder. Symptoms have progressively worsened with decreased ability to tolerate PO. Pt reported a 40lbs unintentional weight loss since Nov 2019. She endorses minimal intake for the last 2 wks, constipation, acid reflux and N/V. Denied chills or night sweat. Denied abdominal pain, or diarrhea.     CT abdomen with multiple abnormalities: Distended stomach, 1st, 2nd and 3rd portions of the duodenum suggestive of an obstructive process.  Ascites in the abdomen and pelvis and abnormal low-density foci along the capsule of the right lobe of the liver laterally and inferiorly suspicious for carcinomatosis. Abnormal diminished attenuation in the region of the body and tail of the pancreas with fluid adjacent to these areas.  Findings could represent pancreatic neoplasm and/or pancreatitis. Bilateral pleural effusions with airspace consolidation in the lower lobes bilaterally as well as in the right middle lobe.    Giving the presentation and CT finding, concerning for cancer process   AES 1/20: - LA Grade C reflux esophagitis. A medium amount of food (residue) in the stomach. Acquired duodenal stenosis. A mass was identified in the pancreatic head. Tissue was obtained from this exam,the endosonographic appearance is consistent with adenocarcinoma. Result came positive for Adenocarcinoma. Patient expressed her wished to continue treatment in [place] where her son lives.   - s/p EGD with stent placement  - Oncology/ surgical oncology consulted, appreciate recs         "

## 2020-01-24 NOTE — PLAN OF CARE
Met with patient and family at bedside to discuss her discharge after getting a call from her son this morning asking about transferring her to [place], [place].  Now, patient stated she will be discharging to her brother's home, ([facility], [address], [place], [place], [phone]).  Patient family given the [facility] Medicaid Office [phone] number in case she does decide to go to [place] to stop the [place] Medicaid application so she could apply for [place] Medicaid.  They verbalized understanding.  Will continue to follow for needs.       01/24/20 1431   Discharge Reassessment   Assessment Type Discharge Planning Reassessment   Discharge Plan A Home with family

## 2020-01-24 NOTE — SUBJECTIVE & OBJECTIVE
Interval History: Patient today feels better comparing to last night, she was active, smiling and answering questions. NGT removed. EGD with stent placement 1/22. Patient feels better, no N/V. Tolerating CLD.   Review of Systems   Constitutional: Positive for activity change, appetite change, fatigue and unexpected weight change. Negative for chills, diaphoresis and fever.   HENT: Negative for congestion and trouble swallowing.    Eyes: Negative for pain.   Respiratory: Negative for cough, shortness of breath and wheezing.    Cardiovascular: Negative for chest pain, palpitations and leg swelling.   Gastrointestinal: Positive for constipation. Negative for abdominal distention, abdominal pain, blood in stool, diarrhea, nausea and vomiting.   Genitourinary: Negative for dysuria, enuresis, flank pain, frequency and hematuria.   Musculoskeletal: Negative for back pain.   Skin: Negative for color change.   Psychiatric/Behavioral: Negative for agitation.     Objective:     Vital Signs (Most Recent):  Temp: 98 °F (36.7 °C) (01/24/20 0526)  Pulse: 82 (01/24/20 0841)  Resp: 20 (01/24/20 0841)  BP: (!) 174/83 (01/24/20 0841)  SpO2: 96 % (01/24/20 0841) Vital Signs (24h Range):  Temp:  [97.9 °F (36.6 °C)-98.8 °F (37.1 °C)] 98 °F (36.7 °C)  Pulse:  [] 82  Resp:  [12-20] 20  SpO2:  [92 %-96 %] 96 %  BP: (146-178)/(73-89) 174/83     Weight: 77.6 kg (171 lb)  Body mass index is 23.85 kg/m².    Intake/Output Summary (Last 24 hours) at 1/24/2020 0944  Last data filed at 1/24/2020 0000  Gross per 24 hour   Intake 3345 ml   Output 400 ml   Net 2945 ml      Physical Exam   Constitutional: She is oriented to person, place, and time. She appears well-developed and well-nourished.   Patient look tired and in pain and bad acid reflux    HENT:   Head: Normocephalic and atraumatic.   Eyes: Pupils are equal, round, and reactive to light. EOM are normal.   Neck: Normal range of motion. Neck supple.   Cardiovascular: Normal rate and  regular rhythm.   No murmur heard.  Pulmonary/Chest: Effort normal and breath sounds normal.   Abdominal: Soft. Bowel sounds are normal. She exhibits no distension. There is no tenderness.   Old midline lower abdominal incision scar from ruptured appendix.     Musculoskeletal: Normal range of motion. She exhibits no edema or deformity.   Neurological: She is alert and oriented to person, place, and time.   Skin: Skin is warm and dry.   Psychiatric:   Depressed mood.       Significant Labs: All pertinent labs within the past 24 hours have been reviewed.    Significant Imaging: I have reviewed and interpreted all pertinent imaging results/findings within the past 24 hours.

## 2020-01-24 NOTE — PROGRESS NOTES
"[facility] Hospital Medicine  Progress Note    Patient Name: Radha Alcazar  MRN: [id number]  Patient Class: IP- Inpatient   Admission Date: 1/18/2020  Length of Stay: 6 days  Attending Physician: Pavan Camarillo MD  Primary Care Provider: Hossein Mortensen MD    [facility] Medicine Team: [facility] HOSP MED 3 Zena Bolden MD    Subjective:     Principal Problem:Pancreatic mass        HPI:  Ms. Alcazar is a 63 yo F with PMHx of HTN and appendectomy who transferred from [facility] for further evaluation of pancreatic mass be hem/Onc and possible biopsy by EUS.     Initially patient hospitalized in [facility] ([facility]) for persistent vomiting and R shoulder pain. CT scan performed at [facility] and was told she had gallbladder stones and a mass on pancreas and was referred to cancer center. Then she presented to [facility] for same problem. Further evaluation done and CT multiple abnormalities.     Pt stated she had decreased appetite since thanksgiving due to the feeling of "food getting stuck in chest" as well as epigastric pain that radiates to her R shoulder. Symptoms have progressively worsened with decreased ability to tolerate PO. Pt reported a 40lbs unintentional weight loss since Nov 2019. She endorses minimal intake for the last 2 wks, constipation, acid reflux and N/V. Denied chills or night sweat. Denied abdominal pain, or diarrhea. Denied SOB, chest pain, cough or palpitation.   Patient never smoke. She don't drink alcohol. NKDA. Family Hx of breast cancer and colon cancer in her mother. She lives in her house with her granddaughter who has Autism.         Overview/Hospital Course:  Patient admitted for further evaluation for pancreatic mass.   CT abdomen (1/17) revealed multiple abnormalities: Distended stomach, 1st, 2nd and 3rd portions of the duodenum suggestive of an obstructive process.  Ascites in the abdomen and pelvis and abnormal low-density foci along the capsule of the right " lobe of the liver laterally and inferiorly suspicious for carcinomatosis. Abnormal diminished attenuation in the region of the body and tail of the pancreas with fluid adjacent to these areas.  Findings could represent pancreatic neoplasm and/or pancreatitis. Bilateral pleural effusions with airspace consolidation in the lower lobes bilaterally as well as in the right middle lobe.     Patient didn't had BM for couple of days. Concern about bowel obstruction overnight, Xray negative for obstruction. Patient on clear liquid diet and NPO after midnight for biopsy by AES 1/20. NGT placed 1/19 for decompression, drain 2220 cc over 24hrs     Mass biopsy done by AES 1/20: - LA Grade C reflux esophagitis.A medium amount of food (residue) in the stomach. Acquired duodenal stenosis. A mass was identified in the pancreatic head. Tissue was obtained from this exam, and results are pending. However, the endosonographic appearance is consistent with adenocarcinoma. Surgical oncology consulted and per GI, patient NG tube clamped. She tolerated clear diet. Biopsy result is Pancreatic Adenocarcinoma. Patient expressed her wished to continue treatment in [place] where her son lives.EGD with stent placement 1/22. Patient tolerating CLD, will advance to soft diet today.plan to discharge tomorrwo. Patient to decide where to start treatment. Referral to Oncology sent.     Interval History: Patient today feels better comparing to last night, she was active, smiling and answering questions. NGT removed. EGD with stent placement 1/22. Patient feels better, no N/V. Tolerating CLD.   Review of Systems   Constitutional: Positive for activity change, appetite change, fatigue and unexpected weight change. Negative for chills, diaphoresis and fever.   HENT: Negative for congestion and trouble swallowing.    Eyes: Negative for pain.   Respiratory: Negative for cough, shortness of breath and wheezing.    Cardiovascular: Negative for chest pain,  palpitations and leg swelling.   Gastrointestinal: Positive for constipation. Negative for abdominal distention, abdominal pain, blood in stool, diarrhea, nausea and vomiting.   Genitourinary: Negative for dysuria, enuresis, flank pain, frequency and hematuria.   Musculoskeletal: Negative for back pain.   Skin: Negative for color change.   Psychiatric/Behavioral: Negative for agitation.     Objective:     Vital Signs (Most Recent):  Temp: 98 °F (36.7 °C) (01/24/20 0526)  Pulse: 82 (01/24/20 0841)  Resp: 20 (01/24/20 0841)  BP: (!) 174/83 (01/24/20 0841)  SpO2: 96 % (01/24/20 0841) Vital Signs (24h Range):  Temp:  [97.9 °F (36.6 °C)-98.8 °F (37.1 °C)] 98 °F (36.7 °C)  Pulse:  [] 82  Resp:  [12-20] 20  SpO2:  [92 %-96 %] 96 %  BP: (146-178)/(73-89) 174/83     Weight: 77.6 kg (171 lb)  Body mass index is 23.85 kg/m².    Intake/Output Summary (Last 24 hours) at 1/24/2020 0944  Last data filed at 1/24/2020 0000  Gross per 24 hour   Intake 3345 ml   Output 400 ml   Net 2945 ml      Physical Exam   Constitutional: She is oriented to person, place, and time. She appears well-developed and well-nourished.   Patient look tired and in pain and bad acid reflux    HENT:   Head: Normocephalic and atraumatic.   Eyes: Pupils are equal, round, and reactive to light. EOM are normal.   Neck: Normal range of motion. Neck supple.   Cardiovascular: Normal rate and regular rhythm.   No murmur heard.  Pulmonary/Chest: Effort normal and breath sounds normal.   Abdominal: Soft. Bowel sounds are normal. She exhibits no distension. There is no tenderness.   Old midline lower abdominal incision scar from ruptured appendix.     Musculoskeletal: Normal range of motion. She exhibits no edema or deformity.   Neurological: She is alert and oriented to person, place, and time.   Skin: Skin is warm and dry.   Psychiatric:   Depressed mood.       Significant Labs: All pertinent labs within the past 24 hours have been reviewed.    Significant  "Imaging: I have reviewed and interpreted all pertinent imaging results/findings within the past 24 hours.      Assessment/Plan:      * Pancreatic mass  Ms. Alcazar is a 65 yo F with PMHx of HTN and appendectomy who transferred from [facility] for further evaluation of pancreatic mass be hem/Onc and possible biopsy by EUS.    Pt stated she had decreased appetite since thanksgiving due to the feeling of "food getting stuck in chest" as well as epigastric pain that radiates to her R shoulder. Symptoms have progressively worsened with decreased ability to tolerate PO. Pt reported a 40lbs unintentional weight loss since Nov 2019. She endorses minimal intake for the last 2 wks, constipation, acid reflux and N/V. Denied chills or night sweat. Denied abdominal pain, or diarrhea.     CT abdomen with multiple abnormalities: Distended stomach, 1st, 2nd and 3rd portions of the duodenum suggestive of an obstructive process.  Ascites in the abdomen and pelvis and abnormal low-density foci along the capsule of the right lobe of the liver laterally and inferiorly suspicious for carcinomatosis. Abnormal diminished attenuation in the region of the body and tail of the pancreas with fluid adjacent to these areas.  Findings could represent pancreatic neoplasm and/or pancreatitis. Bilateral pleural effusions with airspace consolidation in the lower lobes bilaterally as well as in the right middle lobe.    Giving the presentation and CT finding, concerning for cancer process   AES 1/20: - [place] Grade C reflux esophagitis. A medium amount of food (residue) in the stomach. Acquired duodenal stenosis. A mass was identified in the pancreatic head. Tissue was obtained from this exam,the endosonographic appearance is consistent with adenocarcinoma. Result came positive for Adenocarcinoma. Patient expressed her wished to continue treatment in [place] where her son lives.   - s/p EGD with stent placement  - Oncology/ " surgical oncology consulted, appreciate recs           Severe malnutrition        Depressed mood  Patient in very depressed mood due to her medical condition. She feels depressed, lost of intrest. No planning of harming herself or others. Difficulty sleeping related to the Acid reflux. She doesn't feels guilty or had dec of concentration.     Plan:   - Will consider starting SSRI once later once definitive diagnoses is establish and no improvement in her condition.       Normocytic anemia  Hb on admission 11.6  Iron WNL. Transferrin and TIBC decrease   Workup showed most likely to be Anemia of chronic disease     Plan:   - Daily CBC         Leukocytosis        Transaminitis    . AST 75 and ALT 90  Normal bili   CT showed: A 1 cm low-density abnormality is noted in the anterior segment of the right lobe of the liver as seen on axial image 24 and series 2.  Levels stabilized           Hypokalemia  - Replete as needed       Abnormal CT of the abdomen  See pancreatic mass       Pleural effusion  CT showed moderate bilateral pleural effusions. Patient Asymptomatic.   May consider thoracentesis and fluid analysis for further diagnosis if clinically worsened      Lung consolidation  CT/Xray showed Bilateral lung consolidation, patient afebrile, denied cough, SOB or chest pain.   + leukocytosis   Vanc and zosyn initiated outside the facility      Ddx: Most likely pneumonia, Yuliya?   Leukocytosis improved after starting Abx     Plan:   - de-esclate Abx to zosyn, stop date 1/21      Intractable nausea and vomiting  US abdomen: Distended fluid-filled stomach in the left upper quadrant.  Most likely related to pancreatic mass/ascites     - NGT removed.   - s/p EGD with stent placement.   - tolerating CLD    Hypertension  BP elevated since the admission, most likely from the pain and anxiety     Plan:   - Resume home Lisinopril and hydrochlorothiazide   - Added Amlodipine  - Monitor and adjust meds as needed         VTE  Risk Mitigation (From admission, onward)         Ordered     Place sequential compression device  Until discontinued      01/18/20 1805     IP VTE LOW RISK PATIENT  Once      01/18/20 1805                      Zena Bolden MD  Department of Hospital Medicine   [facility]

## 2020-01-25 LAB
ALBUMIN SERPL BCP-MCNC: 2.3 G/DL (ref 3.5–5.2)
ALP SERPL-CCNC: 208 U/L (ref 55–135)
ALT SERPL W/O P-5'-P-CCNC: 90 U/L (ref 10–44)
ANION GAP SERPL CALC-SCNC: 10 MMOL/L (ref 8–16)
AST SERPL-CCNC: 89 U/L (ref 10–40)
BASOPHILS # BLD AUTO: 0.07 K/UL (ref 0–0.2)
BASOPHILS NFR BLD: 0.6 % (ref 0–1.9)
BILIRUB SERPL-MCNC: 0.6 MG/DL (ref 0.1–1)
BUN SERPL-MCNC: 7 MG/DL (ref 8–23)
CALCIUM SERPL-MCNC: 8.3 MG/DL (ref 8.7–10.5)
CHLORIDE SERPL-SCNC: 101 MMOL/L (ref 95–110)
CO2 SERPL-SCNC: 25 MMOL/L (ref 23–29)
CREAT SERPL-MCNC: 0.7 MG/DL (ref 0.5–1.4)
DIFFERENTIAL METHOD: ABNORMAL
EOSINOPHIL # BLD AUTO: 0.3 K/UL (ref 0–0.5)
EOSINOPHIL NFR BLD: 2.9 % (ref 0–8)
ERYTHROCYTE [DISTWIDTH] IN BLOOD BY AUTOMATED COUNT: 14.4 % (ref 11.5–14.5)
EST. GFR  (AFRICAN AMERICAN): >60 ML/MIN/1.73 M^2
EST. GFR  (NON AFRICAN AMERICAN): >60 ML/MIN/1.73 M^2
GLUCOSE SERPL-MCNC: 143 MG/DL (ref 70–110)
HCT VFR BLD AUTO: 34.9 % (ref 37–48.5)
HGB BLD-MCNC: 11.4 G/DL (ref 12–16)
IMM GRANULOCYTES # BLD AUTO: 0.07 K/UL (ref 0–0.04)
IMM GRANULOCYTES NFR BLD AUTO: 0.6 % (ref 0–0.5)
LYMPHOCYTES # BLD AUTO: 1.3 K/UL (ref 1–4.8)
LYMPHOCYTES NFR BLD: 11.2 % (ref 18–48)
MAGNESIUM SERPL-MCNC: 1.7 MG/DL (ref 1.6–2.6)
MCH RBC QN AUTO: 31.1 PG (ref 27–31)
MCHC RBC AUTO-ENTMCNC: 32.7 G/DL (ref 32–36)
MCV RBC AUTO: 95 FL (ref 82–98)
MONOCYTES # BLD AUTO: 1.3 K/UL (ref 0.3–1)
MONOCYTES NFR BLD: 10.7 % (ref 4–15)
NEUTROPHILS # BLD AUTO: 8.8 K/UL (ref 1.8–7.7)
NEUTROPHILS NFR BLD: 74 % (ref 38–73)
NRBC BLD-RTO: 0 /100 WBC
PHOSPHATE SERPL-MCNC: 2.7 MG/DL (ref 2.7–4.5)
PLATELET # BLD AUTO: 412 K/UL (ref 150–350)
PMV BLD AUTO: 10 FL (ref 9.2–12.9)
POTASSIUM SERPL-SCNC: 3.4 MMOL/L (ref 3.5–5.1)
PROT SERPL-MCNC: 5.8 G/DL (ref 6–8.4)
RBC # BLD AUTO: 3.67 M/UL (ref 4–5.4)
SODIUM SERPL-SCNC: 136 MMOL/L (ref 136–145)
WBC # BLD AUTO: 11.9 K/UL (ref 3.9–12.7)

## 2020-01-25 PROCEDURE — 85025 COMPLETE CBC W/AUTO DIFF WBC: CPT

## 2020-01-25 PROCEDURE — 84100 ASSAY OF PHOSPHORUS: CPT

## 2020-01-25 PROCEDURE — 20600001 HC STEP DOWN PRIVATE ROOM

## 2020-01-25 PROCEDURE — 25000003 PHARM REV CODE 250: Performed by: HOSPITALIST

## 2020-01-25 PROCEDURE — 80053 COMPREHEN METABOLIC PANEL: CPT

## 2020-01-25 PROCEDURE — 36415 COLL VENOUS BLD VENIPUNCTURE: CPT

## 2020-01-25 PROCEDURE — 99231 PR SUBSEQUENT HOSPITAL CARE,LEVL I: ICD-10-PCS | Mod: ,,,

## 2020-01-25 PROCEDURE — 25000003 PHARM REV CODE 250: Performed by: STUDENT IN AN ORGANIZED HEALTH CARE EDUCATION/TRAINING PROGRAM

## 2020-01-25 PROCEDURE — 83735 ASSAY OF MAGNESIUM: CPT

## 2020-01-25 PROCEDURE — 63600175 PHARM REV CODE 636 W HCPCS: Performed by: STUDENT IN AN ORGANIZED HEALTH CARE EDUCATION/TRAINING PROGRAM

## 2020-01-25 PROCEDURE — 99231 SBSQ HOSP IP/OBS SF/LOW 25: CPT | Mod: ,,,

## 2020-01-25 RX ORDER — POTASSIUM CHLORIDE 20 MEQ/15ML
40 SOLUTION ORAL ONCE
Status: DISCONTINUED | OUTPATIENT
Start: 2020-01-25 | End: 2020-01-26 | Stop reason: HOSPADM

## 2020-01-25 RX ORDER — AMLODIPINE BESYLATE 5 MG/1
5 TABLET ORAL DAILY
Qty: 30 TABLET | Refills: 11 | Status: ON HOLD | OUTPATIENT
Start: 2020-01-26 | End: 2020-03-08 | Stop reason: HOSPADM

## 2020-01-25 RX ORDER — POTASSIUM CHLORIDE 20 MEQ/1
40 TABLET, EXTENDED RELEASE ORAL ONCE
Status: DISCONTINUED | OUTPATIENT
Start: 2020-01-25 | End: 2020-01-25

## 2020-01-25 RX ORDER — MAGNESIUM SULFATE HEPTAHYDRATE 40 MG/ML
2 INJECTION, SOLUTION INTRAVENOUS ONCE
Status: COMPLETED | OUTPATIENT
Start: 2020-01-25 | End: 2020-01-25

## 2020-01-25 RX ADMIN — HYDROCODONE BITARTRATE AND ACETAMINOPHEN 1 TABLET: 5; 325 TABLET ORAL at 09:01

## 2020-01-25 RX ADMIN — MAGNESIUM SULFATE IN WATER 2 G: 40 INJECTION, SOLUTION INTRAVENOUS at 09:01

## 2020-01-25 RX ADMIN — ZOLPIDEM TARTRATE 5 MG: 5 TABLET ORAL at 08:01

## 2020-01-25 RX ADMIN — PANTOPRAZOLE SODIUM 40 MG: 40 TABLET, DELAYED RELEASE ORAL at 09:01

## 2020-01-25 RX ADMIN — AMLODIPINE BESYLATE 5 MG: 5 TABLET ORAL at 09:01

## 2020-01-25 RX ADMIN — LISINOPRIL 20 MG: 20 TABLET ORAL at 09:01

## 2020-01-25 RX ADMIN — HYDROCHLOROTHIAZIDE 12.5 MG: 12.5 TABLET ORAL at 09:01

## 2020-01-25 NOTE — PROGRESS NOTES
"[facility] Hospital Medicine  Progress Note    Patient Name: Radha Alcazar  MRN: [id number]  Patient Class: IP- Inpatient   Admission Date: 1/18/2020  Length of Stay: 7 days  Attending Physician: Pavan Camarillo MD  Primary Care Provider: Hossein Mortensen MD    [facility]: [facility] MED 3 Sergio Aguilar MD    Subjective:     Principal Problem:Pancreatic mass        HPI:  Ms. Alcazar is a 63 yo F with PMHx of HTN and appendectomy who transferred from [facility] for further evaluation of pancreatic mass be hem/Onc and possible biopsy by EUS.     Initially patient hospitalized in [facility] ([facility]) for persistent vomiting and R shoulder pain. CT scan performed at [facility] and was told she had gallbladder stones and a mass on pancreas and was referred to cancer center. Then she presented to [facility] for same problem. Further evaluation done and CT multiple abnormalities.     Pt stated she had decreased appetite since thanksgiving due to the feeling of "food getting stuck in chest" as well as epigastric pain that radiates to her R shoulder. Symptoms have progressively worsened with decreased ability to tolerate PO. Pt reported a 40lbs unintentional weight loss since Nov 2019. She endorses minimal intake for the last 2 wks, constipation, acid reflux and N/V. Denied chills or night sweat. Denied abdominal pain, or diarrhea. Denied SOB, chest pain, cough or palpitation.   Patient never smoke. She don't drink alcohol. NKDA. Family Hx of breast cancer and colon cancer in her mother. She lives in her house with her granddaughter who has Autism.         Overview/Hospital Course:  Patient admitted for further evaluation for pancreatic mass. At presentation, patient needed NG tube placement of concerns of bowel obstruction considering her outside hospital imagings and abdominal distention. CT abdomen (1/17) revealed multiple abnormalities: Distended stomach, 1st, 2nd and 3rd " portions of the duodenum suggestive of an obstructive process.  Ascites in the abdomen and pelvis and abnormal low-density foci along the capsule of the right lobe of the liver laterally and inferiorly suspicious for carcinomatosis. Abnormal diminished attenuation in the region of the body and tail of the pancreas with fluid adjacent to these areas.  Findings could represent pancreatic neoplasm and/or pancreatitis. Bilateral pleural effusions with airspace consolidation in the lower lobes bilaterally as well as in the right middle lobe.She underwent EUS with LA Grade C reflux esophagitis.A medium amount of food (residue) in the stomach. Acquired duodenal stenosis. A mass was identified in the pancreatic head. Tissue was obtained from this exam which was consisting with Adenocarcinoma. Surgical oncology and GI were following and were able to remove NG tube and place duodenal stent. She tolerated clear diet then advanced to mechanical diet. Discussed options with patient as she initially wanted to leave to [place] with her son and establish care there however, she changed her mind and opted to stay here and get a referral to Hem/Onc which was placed. She would like to rethink her hospice options.     Interval History: NAEON. Tolerating Mechanical diet. No pain.     Review of Systems   Constitutional: Positive for fatigue and unexpected weight change. Negative for chills, diaphoresis and fever. Appetite change: improved    HENT: Negative for congestion and trouble swallowing.    Eyes: Negative for pain.   Respiratory: Negative for cough, shortness of breath and wheezing.    Cardiovascular: Negative for chest pain, palpitations and leg swelling.   Gastrointestinal: Negative for abdominal distention, abdominal pain, blood in stool, diarrhea, nausea and vomiting.   Genitourinary: Negative for dysuria, enuresis, flank pain, frequency and hematuria.   Musculoskeletal: Negative for back pain.   Skin: Negative for color  change.   Psychiatric/Behavioral: Negative for agitation.     Objective:     Vital Signs (Most Recent):  Temp: 98.2 °F (36.8 °C) (01/25/20 1240)  Pulse: (!) 113 (01/25/20 1240)  Resp: 14 (01/25/20 1240)  BP: (!) 162/88 (01/25/20 1300)  SpO2: 96 % (01/25/20 1240) Vital Signs (24h Range):  Temp:  [96.7 °F (35.9 °C)-98.3 °F (36.8 °C)] 98.2 °F (36.8 °C)  Pulse:  [] 113  Resp:  [14-20] 14  SpO2:  [95 %-97 %] 96 %  BP: (159-173)/(77-90) 162/88     Weight: 77.6 kg (171 lb)  Body mass index is 23.85 kg/m².  No intake or output data in the 24 hours ending 01/25/20 1552   Physical Exam   Constitutional: She is oriented to person, place, and time. She appears well-developed and well-nourished.   Patient look tired and in pain and bad acid reflux    HENT:   Head: Normocephalic and atraumatic.   Eyes: Pupils are equal, round, and reactive to light. EOM are normal.   Neck: Normal range of motion. Neck supple. No JVD present.   Cardiovascular: Normal rate and regular rhythm.   No murmur heard.  Pulmonary/Chest: Effort normal and breath sounds normal.   Abdominal: Soft. Bowel sounds are normal. She exhibits no distension. There is no tenderness.   Old midline lower abdominal incision scar from ruptured appendix.     Musculoskeletal: Normal range of motion. She exhibits no edema or deformity.   Neurological: She is alert and oriented to person, place, and time.   Skin: Skin is warm and dry.   Psychiatric: She has a normal mood and affect. Her behavior is normal. Judgment and thought content normal.   Depressed mood.       Significant Labs: Respiratory Culture: No results for input(s): GSRESP, RESPIRATORYC in the last 48 hours.    Significant Imaging: I have reviewed all pertinent imaging results/findings within the past 24 hours.      Assessment/Plan:      * Pancreatic mass  Ms. Alcazar is a 65 yo F with PMHx of HTN and appendectomy who transferred from [facility] for further evaluation of pancreatic mass  "be hem/Onc and possible biopsy by EUS.    Pt stated she had decreased appetite since thanksgiving due to the feeling of "food getting stuck in chest" as well as epigastric pain that radiates to her R shoulder. Symptoms have progressively worsened with decreased ability to tolerate PO. Pt reported a 40lbs unintentional weight loss since Nov 2019. She endorses minimal intake for the last 2 wks, constipation, acid reflux and N/V. Denied chills or night sweat. Denied abdominal pain, or diarrhea.     CT abdomen with multiple abnormalities: Distended stomach, 1st, 2nd and 3rd portions of the duodenum suggestive of an obstructive process.  Ascites in the abdomen and pelvis and abnormal low-density foci along the capsule of the right lobe of the liver laterally and inferiorly suspicious for carcinomatosis. Abnormal diminished attenuation in the region of the body and tail of the pancreas with fluid adjacent to these areas.  Findings could represent pancreatic neoplasm and/or pancreatitis. Bilateral pleural effusions with airspace consolidation in the lower lobes bilaterally as well as in the right middle lobe.    Giving the presentation and CT finding, concerning for cancer process   AES 1/20: - LA Grade C reflux esophagitis. A medium amount of food (residue) in the stomach. Acquired duodenal stenosis. A mass was identified in the pancreatic head. Tissue was obtained from this exam,the endosonographic appearance is consistent with adenocarcinoma. Result came positive for Adenocarcinoma. Patient expressed her wishes initially to continue treatment in [place] where her son lives then changed her mind to stay here.   - s/p EGD with stent placement  - Hem/Onc referral at discharge      Severe malnutrition        Depressed mood  Patient in very depressed mood due to her medical condition. She feels depressed, lost of intrest. No planning of harming herself or others. Difficulty sleeping related to the Acid reflux. She " doesn't feels guilty or had dec of concentration.     Plan:   - Will consider starting SSRI once later once definitive diagnoses is establish and no improvement in her condition.       Normocytic anemia  Hb on admission 11.6  Iron WNL. Transferrin and TIBC decrease   Workup showed most likely to be Anemia of chronic disease     Plan:   - Daily CBC         Leukocytosis        Transaminitis    . AST 75 and ALT 90  Normal bili   CT showed: A 1 cm low-density abnormality is noted in the anterior segment of the right lobe of the liver as seen on axial image 24 and series 2.  Levels stabilized           Hypokalemia  - Replete as needed       Abnormal CT of the abdomen  See pancreatic mass       Pleural effusion  CT showed moderate bilateral pleural effusions. Patient Asymptomatic.   May consider thoracentesis and fluid analysis for further diagnosis if clinically worsened      Lung consolidation  CT/Xray showed Bilateral lung consolidation, patient afebrile, denied cough, SOB or chest pain.   + leukocytosis   Vanc and zosyn initiated outside the facility      Ddx: Most likely pneumonia, [place]?   Leukocytosis improved after starting Abx     Plan:   - de-esclate Abx to zosyn, stop date 1/21      Intractable nausea and vomiting  US abdomen: Distended fluid-filled stomach in the left upper quadrant.  Most likely related to pancreatic mass/ascites     - NGT removed.   - s/p EGD with stent placement.   - tolerating mechanical diet    Hypertension  BP elevated since the admission, most likely from the pain and anxiety     Plan:   - Resume home Lisinopril and hydrochlorothiazide   - Added Amlodipine  - Monitor and adjust meds as needed         VTE Risk Mitigation (From admission, onward)         Ordered     Place sequential compression device  Until discontinued      01/18/20 1805     IP VTE LOW RISK PATIENT  Once      01/18/20 1805                      Sergio Aguilar MD  Department of Hospital Medicine   [facility]  [place]

## 2020-01-25 NOTE — PLAN OF CARE
Plan of care reviewed with patient and family who demonstrate understanding. Pt. AAOx4, VSS on room air. Pt. Mood seems to be improving. Pt. Ambulates independently to the bathroom. Urinating adequate amounts. Pt. Does not complain of pain or nausea. Bed in low position, bed rails x2, family to remain at the bedside, call light within reach, frequent monitoring continued.

## 2020-01-25 NOTE — ASSESSMENT & PLAN NOTE
"Ms. Alcazar is a 63 yo F with PMHx of HTN and appendectomy who transferred from [facility] for further evaluation of pancreatic mass be hem/Onc and possible biopsy by EUS.    Pt stated she had decreased appetite since thanksgiving due to the feeling of "food getting stuck in chest" as well as epigastric pain that radiates to her R shoulder. Symptoms have progressively worsened with decreased ability to tolerate PO. Pt reported a 40lbs unintentional weight loss since Nov 2019. She endorses minimal intake for the last 2 wks, constipation, acid reflux and N/V. Denied chills or night sweat. Denied abdominal pain, or diarrhea.     CT abdomen with multiple abnormalities: Distended stomach, 1st, 2nd and 3rd portions of the duodenum suggestive of an obstructive process.  Ascites in the abdomen and pelvis and abnormal low-density foci along the capsule of the right lobe of the liver laterally and inferiorly suspicious for carcinomatosis. Abnormal diminished attenuation in the region of the body and tail of the pancreas with fluid adjacent to these areas.  Findings could represent pancreatic neoplasm and/or pancreatitis. Bilateral pleural effusions with airspace consolidation in the lower lobes bilaterally as well as in the right middle lobe.    Giving the presentation and CT finding, concerning for cancer process   AES 1/20: - LA Grade C reflux esophagitis. A medium amount of food (residue) in the stomach. Acquired duodenal stenosis. A mass was identified in the pancreatic head. Tissue was obtained from this exam,the endosonographic appearance is consistent with adenocarcinoma. Result came positive for Adenocarcinoma. Patient expressed her wishes initially to continue treatment in [place] where her son lives then changed her mind to stay here.   - s/p EGD with stent placement  - Hem/Onc referral at discharge    "

## 2020-01-25 NOTE — PLAN OF CARE
01/25/20 1407   Post-Acute Status   Post-Acute Authorization Home Health/Hospice   Home Health/Hospice Status Awaiting Orders for HH     SW spoke to floor nurse. Informed pt needs hospice orders before referral can be sent. Nurse reported she would contact  to inform.    Patience Breaux, VLAD  [facility]  [id number]

## 2020-01-25 NOTE — PLAN OF CARE
[facility]    HOME HEALTH ORDERS  FACE TO FACE ENCOUNTER    Patient Name: Radha Alcazar  YOB: 1955    PCP: Hossein Mortensen MD   PCP Address: [address] [place] / [place] [address]  PCP Phone Number: [phone]  PCP Fax: [phone]    Encounter Date: 01/25/2020    Admit to Home Health    Diagnoses:  Active Hospital Problems    Diagnosis  POA    *Pancreatic mass [K86.89]  Yes    Severe malnutrition [E43]  Yes    Lung consolidation [J18.1]  Yes    Transaminitis [R74.0]  Yes    Normocytic anemia [D64.9]  Yes    Hypokalemia [E87.6]  Yes    Abnormal CT of the abdomen [R93.5]  Yes    Pleural effusion [J90]  Yes    Depressed mood [F32.9]  Yes    Intractable nausea and vomiting [R11.2]  Yes    Hypertension [I10]  Yes      Resolved Hospital Problems   No resolved problems to display.       No future appointments.        I have seen and examined this patient face to face today. My clinical findings that support the need for the home health skilled services and home bound status are the following:  Weakness/numbness causing balance and gait disturbance due to Malignancy/Cancer making it taxing to leave home.    Allergies:Review of patient's allergies indicates:  No Known Allergies    Diet: soft diet    Activities: activity as tolerated    Nursing:   SN to complete comprehensive assessment including routine vital signs. Instruct on disease process and s/s of complications to report to MD. Review/verify medication list sent home with the patient at time of discharge  and instruct patient/caregiver as needed. Frequency may be adjusted depending on start of care date.    Notify MD if SBP > 160 or < 90; DBP > 90 or < 50; HR > 120 or < 50; Temp > 101      CONSULTS:    Physical Therapy to evaluate and treat. Evaluate for home safety and equipment needs; Establish/upgrade home exercise program. Perform / instruct on therapeutic exercises, gait training, transfer  training, and Range of Motion.  Occupational Therapy to evaluate and treat. Evaluate home environment for safety and equipment needs. Perform/Instruct on transfers, ADL training, ROM, and therapeutic exercises.  Aide to provide assistance with personal care, ADLs, and vital signs.    MISCELLANEOUS CARE:  N/A    WOUND CARE ORDERS  n/a      Medications: Review discharge medications with patient and family and provide education.      Current Discharge Medication List      START taking these medications    Details   amLODIPine (NORVASC) 5 MG tablet Take 1 tablet (5 mg total) by mouth once daily.  Qty: 30 tablet, Refills: 11         CONTINUE these medications which have NOT CHANGED    Details   aspirin-acetaminophen-caffeine 250-250-65 mg (EXCEDRIN EXTRA STRENGTH) 250-250-65 mg per tablet Take 2 tablets by mouth daily as needed for Pain.      lisinopril-hydrochlorothiazide (PRINZIDE,ZESTORETIC) 20-12.5 mg per tablet Take 1 tablet by mouth once daily.      ondansetron (ZOFRAN) 8 MG tablet Take 8 mg by mouth every 8 (eight) hours.      pantoprazole (PROTONIX) 40 MG tablet Take 40 mg by mouth once daily.      promethazine (PHENERGAN) 25 MG suppository Place 25 mg rectally every 6 (six) hours as needed for Nausea.      sucralfate (CARAFATE) 1 gram tablet Take 1 g by mouth 4 (four) times daily.             I certify that this patient is confined to her home and needs intermittent skilled nursing care, physical therapy and occupational therapy.

## 2020-01-25 NOTE — NURSING
Pt and family concerned about being d/c without having hospice. Currently communicating with case management

## 2020-01-25 NOTE — ASSESSMENT & PLAN NOTE
US abdomen: Distended fluid-filled stomach in the left upper quadrant.  Most likely related to pancreatic mass/ascites     - NGT removed.   - s/p EGD with stent placement.   - tolerating mechanical diet

## 2020-01-25 NOTE — ASSESSMENT & PLAN NOTE
CT/Xray showed Bilateral lung consolidation, patient afebrile, denied cough, SOB or chest pain.   + leukocytosis   Vanc and zosyn initiated outside the facility      Ddx: Most likely pneumonia, [place]?   Leukocytosis improved after starting Abx     Plan:   - de-esclate Abx to zosyn, stop date 1/21

## 2020-01-25 NOTE — SUBJECTIVE & OBJECTIVE
Interval History: NAEON. Tolerating Mechanical diet. No pain.     Review of Systems   Constitutional: Positive for fatigue and unexpected weight change. Negative for chills, diaphoresis and fever. Appetite change: improved    HENT: Negative for congestion and trouble swallowing.    Eyes: Negative for pain.   Respiratory: Negative for cough, shortness of breath and wheezing.    Cardiovascular: Negative for chest pain, palpitations and leg swelling.   Gastrointestinal: Negative for abdominal distention, abdominal pain, blood in stool, diarrhea, nausea and vomiting.   Genitourinary: Negative for dysuria, enuresis, flank pain, frequency and hematuria.   Musculoskeletal: Negative for back pain.   Skin: Negative for color change.   Psychiatric/Behavioral: Negative for agitation.     Objective:     Vital Signs (Most Recent):  Temp: 98.2 °F (36.8 °C) (01/25/20 1240)  Pulse: (!) 113 (01/25/20 1240)  Resp: 14 (01/25/20 1240)  BP: (!) 162/88 (01/25/20 1300)  SpO2: 96 % (01/25/20 1240) Vital Signs (24h Range):  Temp:  [96.7 °F (35.9 °C)-98.3 °F (36.8 °C)] 98.2 °F (36.8 °C)  Pulse:  [] 113  Resp:  [14-20] 14  SpO2:  [95 %-97 %] 96 %  BP: (159-173)/(77-90) 162/88     Weight: 77.6 kg (171 lb)  Body mass index is 23.85 kg/m².  No intake or output data in the 24 hours ending 01/25/20 1552   Physical Exam   Constitutional: She is oriented to person, place, and time. She appears well-developed and well-nourished.   Patient look tired and in pain and bad acid reflux    HENT:   Head: Normocephalic and atraumatic.   Eyes: Pupils are equal, round, and reactive to light. EOM are normal.   Neck: Normal range of motion. Neck supple. No JVD present.   Cardiovascular: Normal rate and regular rhythm.   No murmur heard.  Pulmonary/Chest: Effort normal and breath sounds normal.   Abdominal: Soft. Bowel sounds are normal. She exhibits no distension. There is no tenderness.   Old midline lower abdominal incision scar from ruptured appendix.      Musculoskeletal: Normal range of motion. She exhibits no edema or deformity.   Neurological: She is alert and oriented to person, place, and time.   Skin: Skin is warm and dry.   Psychiatric: She has a normal mood and affect. Her behavior is normal. Judgment and thought content normal.   Depressed mood.       Significant Labs: Respiratory Culture: No results for input(s): GSRESP, RESPIRATORYC in the last 48 hours.    Significant Imaging: I have reviewed all pertinent imaging results/findings within the past 24 hours.

## 2020-01-25 NOTE — NURSING
POC reviewed with pt, verbalized understanding. AAOx4. BP elevated. Pt had a spell of dizziness and blurred vision, resolved within few mins. Team aware, no new orders. Up in room ambulating. Tolerating diet, denies N/V. Pain managed with PRN meds. Holding D/c orders d/t home health arrangements. Pt verbalized understanding. No adverse events, WCTM.

## 2020-01-26 VITALS
BODY MASS INDEX: 23.94 KG/M2 | HEART RATE: 101 BPM | HEIGHT: 71 IN | OXYGEN SATURATION: 98 % | DIASTOLIC BLOOD PRESSURE: 93 MMHG | TEMPERATURE: 97 F | RESPIRATION RATE: 14 BRPM | SYSTOLIC BLOOD PRESSURE: 167 MMHG | WEIGHT: 171 LBS

## 2020-01-26 LAB
ALBUMIN SERPL BCP-MCNC: 2.5 G/DL (ref 3.5–5.2)
ALP SERPL-CCNC: 237 U/L (ref 55–135)
ALT SERPL W/O P-5'-P-CCNC: 81 U/L (ref 10–44)
ANION GAP SERPL CALC-SCNC: 8 MMOL/L (ref 8–16)
AST SERPL-CCNC: 69 U/L (ref 10–40)
BASOPHILS # BLD AUTO: 0.08 K/UL (ref 0–0.2)
BASOPHILS NFR BLD: 0.5 % (ref 0–1.9)
BILIRUB SERPL-MCNC: 0.7 MG/DL (ref 0.1–1)
BUN SERPL-MCNC: 10 MG/DL (ref 8–23)
CALCIUM SERPL-MCNC: 8.6 MG/DL (ref 8.7–10.5)
CHLORIDE SERPL-SCNC: 99 MMOL/L (ref 95–110)
CO2 SERPL-SCNC: 25 MMOL/L (ref 23–29)
CREAT SERPL-MCNC: 0.7 MG/DL (ref 0.5–1.4)
DIFFERENTIAL METHOD: ABNORMAL
EOSINOPHIL # BLD AUTO: 0.2 K/UL (ref 0–0.5)
EOSINOPHIL NFR BLD: 1.6 % (ref 0–8)
ERYTHROCYTE [DISTWIDTH] IN BLOOD BY AUTOMATED COUNT: 14.5 % (ref 11.5–14.5)
EST. GFR  (AFRICAN AMERICAN): >60 ML/MIN/1.73 M^2
EST. GFR  (NON AFRICAN AMERICAN): >60 ML/MIN/1.73 M^2
GLUCOSE SERPL-MCNC: 150 MG/DL (ref 70–110)
HCT VFR BLD AUTO: 37.2 % (ref 37–48.5)
HGB BLD-MCNC: 11.9 G/DL (ref 12–16)
IMM GRANULOCYTES # BLD AUTO: 0.07 K/UL (ref 0–0.04)
IMM GRANULOCYTES NFR BLD AUTO: 0.5 % (ref 0–0.5)
LYMPHOCYTES # BLD AUTO: 1.6 K/UL (ref 1–4.8)
LYMPHOCYTES NFR BLD: 10.4 % (ref 18–48)
MAGNESIUM SERPL-MCNC: 1.8 MG/DL (ref 1.6–2.6)
MCH RBC QN AUTO: 30.2 PG (ref 27–31)
MCHC RBC AUTO-ENTMCNC: 32 G/DL (ref 32–36)
MCV RBC AUTO: 94 FL (ref 82–98)
MONOCYTES # BLD AUTO: 1.4 K/UL (ref 0.3–1)
MONOCYTES NFR BLD: 8.9 % (ref 4–15)
NEUTROPHILS # BLD AUTO: 12 K/UL (ref 1.8–7.7)
NEUTROPHILS NFR BLD: 78.1 % (ref 38–73)
NRBC BLD-RTO: 0 /100 WBC
PHOSPHATE SERPL-MCNC: 2.6 MG/DL (ref 2.7–4.5)
PLATELET # BLD AUTO: 480 K/UL (ref 150–350)
PMV BLD AUTO: 9.6 FL (ref 9.2–12.9)
POTASSIUM SERPL-SCNC: 3.6 MMOL/L (ref 3.5–5.1)
PROT SERPL-MCNC: 6.3 G/DL (ref 6–8.4)
RBC # BLD AUTO: 3.94 M/UL (ref 4–5.4)
SODIUM SERPL-SCNC: 132 MMOL/L (ref 136–145)
WBC # BLD AUTO: 15.37 K/UL (ref 3.9–12.7)

## 2020-01-26 PROCEDURE — 25000003 PHARM REV CODE 250: Performed by: STUDENT IN AN ORGANIZED HEALTH CARE EDUCATION/TRAINING PROGRAM

## 2020-01-26 PROCEDURE — 83735 ASSAY OF MAGNESIUM: CPT

## 2020-01-26 PROCEDURE — 90732 PPSV23 VACC 2 YRS+ SUBQ/IM: CPT

## 2020-01-26 PROCEDURE — 90686 IIV4 VACC NO PRSV 0.5 ML IM: CPT

## 2020-01-26 PROCEDURE — 63600175 PHARM REV CODE 636 W HCPCS

## 2020-01-26 PROCEDURE — 36415 COLL VENOUS BLD VENIPUNCTURE: CPT

## 2020-01-26 PROCEDURE — 90471 IMMUNIZATION ADMIN: CPT

## 2020-01-26 PROCEDURE — 84100 ASSAY OF PHOSPHORUS: CPT

## 2020-01-26 PROCEDURE — 90472 IMMUNIZATION ADMIN EACH ADD: CPT

## 2020-01-26 PROCEDURE — 85025 COMPLETE CBC W/AUTO DIFF WBC: CPT

## 2020-01-26 PROCEDURE — 80053 COMPREHEN METABOLIC PANEL: CPT

## 2020-01-26 PROCEDURE — 99239 PR HOSPITAL DISCHARGE DAY,>30 MIN: ICD-10-PCS | Mod: ,,,

## 2020-01-26 PROCEDURE — 25000003 PHARM REV CODE 250: Performed by: HOSPITALIST

## 2020-01-26 PROCEDURE — 99239 HOSP IP/OBS DSCHRG MGMT >30: CPT | Mod: ,,,

## 2020-01-26 RX ORDER — TALC
6 POWDER (GRAM) TOPICAL NIGHTLY PRN
Status: DISCONTINUED | OUTPATIENT
Start: 2020-01-26 | End: 2020-01-26 | Stop reason: HOSPADM

## 2020-01-26 RX ORDER — FAMOTIDINE 20 MG/1
20 TABLET, FILM COATED ORAL DAILY PRN
Qty: 21 TABLET | Refills: 3 | Status: SHIPPED | OUTPATIENT
Start: 2020-01-26 | End: 2021-01-25

## 2020-01-26 RX ORDER — CALCIUM CARBONATE 200(500)MG
500 TABLET,CHEWABLE ORAL DAILY PRN
Status: DISCONTINUED | OUTPATIENT
Start: 2020-01-26 | End: 2020-01-26 | Stop reason: HOSPADM

## 2020-01-26 RX ORDER — CALCIUM CARBONATE 200(500)MG
500 TABLET,CHEWABLE ORAL DAILY PRN
COMMUNITY
Start: 2020-01-26 | End: 2021-01-25

## 2020-01-26 RX ORDER — HYDROCODONE BITARTRATE AND ACETAMINOPHEN 5; 325 MG/1; MG/1
1 TABLET ORAL EVERY 6 HOURS PRN
Qty: 28 TABLET | Refills: 0 | Status: ON HOLD | OUTPATIENT
Start: 2020-01-26 | End: 2020-03-06

## 2020-01-26 RX ORDER — FAMOTIDINE 20 MG/1
20 TABLET, FILM COATED ORAL DAILY PRN
Status: DISCONTINUED | OUTPATIENT
Start: 2020-01-26 | End: 2020-01-26 | Stop reason: HOSPADM

## 2020-01-26 RX ADMIN — CALCIUM CARBONATE (ANTACID) CHEW TAB 500 MG 500 MG: 500 CHEW TAB at 03:01

## 2020-01-26 RX ADMIN — LISINOPRIL 20 MG: 20 TABLET ORAL at 09:01

## 2020-01-26 RX ADMIN — Medication 6 MG: at 12:01

## 2020-01-26 RX ADMIN — AMLODIPINE BESYLATE 5 MG: 5 TABLET ORAL at 09:01

## 2020-01-26 RX ADMIN — PNEUMOCOCCAL VACCINE POLYVALENT 0.5 ML
25; 25; 25; 25; 25; 25; 25; 25; 25; 25; 25; 25; 25; 25; 25; 25; 25; 25; 25; 25; 25; 25; 25 INJECTION, SOLUTION INTRAMUSCULAR; SUBCUTANEOUS at 03:01

## 2020-01-26 RX ADMIN — CALCIUM CARBONATE (ANTACID) CHEW TAB 500 MG 500 MG: 500 CHEW TAB at 11:01

## 2020-01-26 RX ADMIN — HYDROCODONE BITARTRATE AND ACETAMINOPHEN 1 TABLET: 5; 325 TABLET ORAL at 03:01

## 2020-01-26 RX ADMIN — CALCIUM CARBONATE (ANTACID) CHEW TAB 500 MG 500 MG: 500 CHEW TAB at 12:01

## 2020-01-26 RX ADMIN — HYDROCODONE BITARTRATE AND ACETAMINOPHEN 1 TABLET: 5; 325 TABLET ORAL at 07:01

## 2020-01-26 RX ADMIN — HYDROCHLOROTHIAZIDE 12.5 MG: 12.5 TABLET ORAL at 09:01

## 2020-01-26 RX ADMIN — PANTOPRAZOLE SODIUM 40 MG: 40 TABLET, DELAYED RELEASE ORAL at 09:01

## 2020-01-26 RX ADMIN — INFLUENZA VIRUS VACCINE 0.5 ML: 15; 15; 15; 15 SUSPENSION INTRAMUSCULAR at 03:01

## 2020-01-26 NOTE — ASSESSMENT & PLAN NOTE
CT/Xray showed Bilateral lung consolidation, patient afebrile, denied cough, SOB or chest pain.   + leukocytosis   Vanc and zosyn initiated outside the facility      Ddx: Most likely pneumonia, [place]?   Leukocytosis improved after starting Abx     Plan:   - de-esclate Abx to zosyn, stop date 1/21

## 2020-01-26 NOTE — NURSING
D/c instructions given. Pt with all medications. Pt and family educated on [facility]. VSS. Left with family via transport.

## 2020-01-26 NOTE — PLAN OF CARE
CM notified that pt will d/c and needs h/h. Per chart review, KALEB noted that pt is medicaid pending. CM called MD back and stated that h/h is not an option d/t lack of payor. CM stated that medicaid fatou was submitted for pt and when active pt could have a h/h referral. Pt is expected to have f/u in Heme/Onc so CM suggested that MD send an in basket msg to clinic for h/h to be setup if pt becomes eligible. MD stated he will sent a msg.

## 2020-01-26 NOTE — ASSESSMENT & PLAN NOTE
"Ms. Alcazar is a 65 yo F with PMHx of HTN and appendectomy who transferred from [facility] for further evaluation of pancreatic mass be hem/Onc and possible biopsy by EUS.    Pt stated she had decreased appetite since thanksgiving due to the feeling of "food getting stuck in chest" as well as epigastric pain that radiates to her R shoulder. Symptoms have progressively worsened with decreased ability to tolerate PO. Pt reported a 40lbs unintentional weight loss since Nov 2019. She endorses minimal intake for the last 2 wks, constipation, acid reflux and N/V. Denied chills or night sweat. Denied abdominal pain, or diarrhea.     CT abdomen with multiple abnormalities: Distended stomach, 1st, 2nd and 3rd portions of the duodenum suggestive of an obstructive process.  Ascites in the abdomen and pelvis and abnormal low-density foci along the capsule of the right lobe of the liver laterally and inferiorly suspicious for carcinomatosis. Abnormal diminished attenuation in the region of the body and tail of the pancreas with fluid adjacent to these areas.  Findings could represent pancreatic neoplasm and/or pancreatitis. Bilateral pleural effusions with airspace consolidation in the lower lobes bilaterally as well as in the right middle lobe.    Giving the presentation and CT finding, concerning for cancer process   AES 1/20: - LA Grade C reflux esophagitis. A medium amount of food (residue) in the stomach. Acquired duodenal stenosis. A mass was identified in the pancreatic head. Tissue was obtained from this exam,the endosonographic appearance is consistent with adenocarcinoma. Result came positive for Adenocarcinoma. Patient expressed her wishes initially to continue treatment in [place] where her son lives then changed her mind to stay here.   - s/p EGD with stent placement  - Hem/Onc referral at discharge    "

## 2020-01-26 NOTE — PLAN OF CARE
POC reviewed with patient and spouse, understanding verbalized. Pt ANOx4, VSS on RA. Pt ambulating to RR as needed, having very small pellet like Bm's. Pt complaining of heart burn tonight, treated with PRN Tums. Pain treated w/ PRN meds per MAR. Pt able to make needs known and remains free of fall or injury as safety measures intact. No further questions or needs reported at this time. Will continue to monitor and plan for D/C 1/26.

## 2020-01-26 NOTE — DISCHARGE SUMMARY
"[facility] Hospital Medicine  Discharge Summary      Patient Name: Radha Alcazar  MRN: [id number]  Admission Date: 1/18/2020  Hospital Length of Stay: 8 days  Discharge Date and Time:  01/26/2020 9:28 AM  Attending Physician: Pavan Camarillo MD   Discharging Provider: Zena Bolden MD  Primary Care Provider: Hossein Mortensen MD  Hospital Medicine Team: [facility] HOSP MED 3 Zena Bolden MD    HPI:   Ms. Alcazar is a 63 yo F with PMHx of HTN and appendectomy who transferred from [facility] for further evaluation of pancreatic mass be hem/Onc and possible biopsy by EUS.     Initially patient hospitalized in [facility] ([facility]) for persistent vomiting and R shoulder pain. CT scan performed at [facility] and was told she had gallbladder stones and a mass on pancreas and was referred to cancer center. Then she presented to [facility] for same problem. Further evaluation done and CT multiple abnormalities.     Pt stated she had decreased appetite since thanksgiving due to the feeling of "food getting stuck in chest" as well as epigastric pain that radiates to her R shoulder. Symptoms have progressively worsened with decreased ability to tolerate PO. Pt reported a 40lbs unintentional weight loss since Nov 2019. She endorses minimal intake for the last 2 wks, constipation, acid reflux and N/V. Denied chills or night sweat. Denied abdominal pain, or diarrhea. Denied SOB, chest pain, cough or palpitation.   Patient never smoke. She don't drink alcohol. NKDA. Family Hx of breast cancer and colon cancer in her mother. She lives in her house with her granddaughter who has Autism.         Procedure(s) (LRB):  EGD (ESOPHAGOGASTRODUODENOSCOPY) (N/A)      Hospital Course:   Patient admitted for further evaluation for pancreatic mass. At presentation, patient needed NG tube placement of concerns of bowel obstruction considering her outside hospital imagings and abdominal distention. CT abdomen (1/17) revealed " multiple abnormalities: Distended stomach, 1st, 2nd and 3rd portions of the duodenum suggestive of an obstructive process.  Ascites in the abdomen and pelvis and abnormal low-density foci along the capsule of the right lobe of the liver laterally and inferiorly suspicious for carcinomatosis. Abnormal diminished attenuation in the region of the body and tail of the pancreas with fluid adjacent to these areas.  Findings could represent pancreatic neoplasm and/or pancreatitis. Bilateral pleural effusions with airspace consolidation in the lower lobes bilaterally as well as in the right middle lobe.She underwent EUS with LA Grade C reflux esophagitis.A medium amount of food (residue) in the stomach. Acquired duodenal stenosis. A mass was identified in the pancreatic head. Tissue was obtained from this exam which was consisting with Adenocarcinoma. Surgical oncology and GI were following and were able to remove NG tube and place duodenal stent. She tolerated clear diet then advanced to mechanical diet. Discussed options with patient as she initially wanted to leave to [place] with her son and establish care there however, she changed her mind and opted to stay here and get a referral to Hem/Onc which was placed. She would like to rethink her hospice options.        Vitals:    01/25/20 2000 01/26/20 0108 01/26/20 0540 01/26/20 0737   BP: (!) 164/92 (!) 143/72 (!) 154/79 (!) 150/78   BP Location: Left arm  Right arm Left arm   Patient Position: Lying  Lying Lying   Pulse: 107 104 95 91   Resp: 18 18 18 16   Temp: 98 °F (36.7 °C) 98.9 °F (37.2 °C) 98.3 °F (36.8 °C) 97.7 °F (36.5 °C)   TempSrc: Oral   Oral   SpO2: 96% 95% (!) 93% (!) 94%   Weight:       Height:         Physical Exam  Constitutional: She is oriented to person, place, and time. She appears well-developed and well-nourished.   Patient look tired and in pain and bad acid reflux    HENT:   Head: Normocephalic and atraumatic.   Eyes: Pupils are equal, round,  "and reactive to light. EOM are normal.   Neck: Normal range of motion. Neck supple. No JVD present.   Cardiovascular: Normal rate and regular rhythm.   No murmur heard.  Pulmonary/Chest: Effort normal and breath sounds normal.   Abdominal: Soft. Bowel sounds are normal. She exhibits no distension. There is no tenderness.   Old midline lower abdominal incision scar from ruptured appendix.     Musculoskeletal: Normal range of motion. She exhibits no edema or deformity.   Neurological: She is alert and oriented to person, place, and time.   Skin: Skin is warm and dry.   Psychiatric: She has a normal mood and affect. Her behavior is normal. Judgment and thought content normal.   Depressed mood.   Consults:   Consults (From admission, onward)        Status Ordering Provider     Inpatient consult to Advanced Endoscopy Service (AES)  Once     Provider:  (Not yet assigned)    Completed MOAFADAGMAR     Inpatient consult to Hematology/Oncology  Once     Provider:  (Not yet assigned)    Completed MOAFADAGMAR     Inpatient consult to Registered Dietitian/Nutritionist  Once     Provider:  (Not yet assigned)    Completed DAGMAR JUNIOR     Inpatient consult to Surgical Oncology  Once     Provider:  (Not yet assigned)    Completed KIMBERLEY SHETTY          * Pancreatic mass  Ms. Alcazar is a 63 yo F with PMHx of HTN and appendectomy who transferred from [facility] for further evaluation of pancreatic mass be hem/Onc and possible biopsy by EUS.    Pt stated she had decreased appetite since thanksgiving due to the feeling of "food getting stuck in chest" as well as epigastric pain that radiates to her R shoulder. Symptoms have progressively worsened with decreased ability to tolerate PO. Pt reported a 40lbs unintentional weight loss since Nov 2019. She endorses minimal intake for the last 2 wks, constipation, acid reflux and N/V. Denied chills or night sweat. Denied abdominal pain, or diarrhea.     CT abdomen with " multiple abnormalities: Distended stomach, 1st, 2nd and 3rd portions of the duodenum suggestive of an obstructive process.  Ascites in the abdomen and pelvis and abnormal low-density foci along the capsule of the right lobe of the liver laterally and inferiorly suspicious for carcinomatosis. Abnormal diminished attenuation in the region of the body and tail of the pancreas with fluid adjacent to these areas.  Findings could represent pancreatic neoplasm and/or pancreatitis. Bilateral pleural effusions with airspace consolidation in the lower lobes bilaterally as well as in the right middle lobe.    Giving the presentation and CT finding, concerning for cancer process   AES 1/20: - LA Grade C reflux esophagitis. A medium amount of food (residue) in the stomach. Acquired duodenal stenosis. A mass was identified in the pancreatic head. Tissue was obtained from this exam,the endosonographic appearance is consistent with adenocarcinoma. Result came positive for Adenocarcinoma. Patient expressed her wishes initially to continue treatment in [place] where her son lives then changed her mind to stay here.   - s/p EGD with stent placement  - Hem/Onc referral at discharge      Severe malnutrition        Depressed mood  Patient in very depressed mood due to her medical condition. She feels depressed, lost of intrest. No planning of harming herself or others. Difficulty sleeping related to the Acid reflux. She doesn't feels guilty or had dec of concentration.     Plan:   - Will consider starting SSRI once later once definitive diagnoses is establish and no improvement in her condition.       Normocytic anemia  Hb on admission 11.6  Iron WNL. Transferrin and TIBC decrease   Workup showed most likely to be Anemia of chronic disease     Plan:   - Daily CBC         Transaminitis    . AST 75 and ALT 90  Normal bili   CT showed: A 1 cm low-density abnormality is noted in the anterior segment of the right lobe of the liver  as seen on axial image 24 and series 2.  Levels stabilized           Hypokalemia  - Replete as needed       Abnormal CT of the abdomen  See pancreatic mass       Pleural effusion  CT showed moderate bilateral pleural effusions. Patient Asymptomatic.   May consider thoracentesis and fluid analysis for further diagnosis if clinically worsened      Lung consolidation  CT/Xray showed Bilateral lung consolidation, patient afebrile, denied cough, SOB or chest pain.   + leukocytosis   Vanc and zosyn initiated outside the facility      Ddx: Most likely pneumonia, Yuliya?   Leukocytosis improved after starting Abx     Plan:   - de-esclate Abx to zosyn, stop date 1/21      Intractable nausea and vomiting  US abdomen: Distended fluid-filled stomach in the left upper quadrant.  Most likely related to pancreatic mass/ascites     - NGT removed.   - s/p EGD with stent placement.   - tolerating mechanical diet    Hypertension  BP elevated since the admission, most likely from the pain and anxiety     Plan:   - Resume home Lisinopril and hydrochlorothiazide   - Added Amlodipine  - Monitor and adjust meds as needed         Final Active Diagnoses:    Diagnosis Date Noted POA    PRINCIPAL PROBLEM:  Pancreatic mass [K86.89] 01/18/2020 Yes    Severe malnutrition [E43] 01/23/2020 Yes    Lung consolidation [J18.1] 01/18/2020 Yes    Transaminitis [R74.0] 01/18/2020 Yes    Normocytic anemia [D64.9] 01/18/2020 Yes    Hypokalemia [E87.6] 01/18/2020 Yes    Abnormal CT of the abdomen [R93.5] 01/18/2020 Yes    Pleural effusion [J90] 01/18/2020 Yes    Depressed mood [F32.9] 01/18/2020 Yes    Intractable nausea and vomiting [R11.2] 01/17/2020 Yes    Hypertension [I10] 01/17/2020 Yes      Problems Resolved During this Admission:       Discharged Condition: stable    Disposition: Home or Self Care    Follow Up:    Patient Instructions:      HOSPITAL BED FOR HOME USE     Order Specific Question Answer Comments   Type: Semi-electric    Length  "of need (1-99 months): 99    Does patient have medical equipment at home? none    Height: 5' 11" (1.803 m)    Weight: 77.6 kg (171 lb)    Please check all that apply: Patient requires, for the alleviation of pain, positioning of the body in ways not feasible in an ordinary bed.      Ambulatory Referral to Oncology   Referral Priority: Routine Referral Type: Consultation   Referral Reason: Specialty Services Required   Requested Specialty: Oncology   Number of Visits Requested: 1     Notify your health care provider if you experience any of the following:  temperature >100.4     Notify your health care provider if you experience any of the following:  persistent nausea and vomiting or diarrhea     Notify your health care provider if you experience any of the following:  severe uncontrolled pain     Notify your health care provider if you experience any of the following:  redness, tenderness, or signs of infection (pain, swelling, redness, odor or green/yellow discharge around incision site)     Notify your health care provider if you experience any of the following:  difficulty breathing or increased cough     Notify your health care provider if you experience any of the following:  persistent dizziness, light-headedness, or visual disturbances     Notify your health care provider if you experience any of the following:  increased confusion or weakness     Activity as tolerated       Significant Diagnostic Studies: Labs:   CMP   Recent Labs   Lab 01/25/20  0358 01/26/20  0334    132*   K 3.4* 3.6    99   CO2 25 25   * 150*   BUN 7* 10   CREATININE 0.7 0.7   CALCIUM 8.3* 8.6*   PROT 5.8* 6.3   ALBUMIN 2.3* 2.5*   BILITOT 0.6 0.7   ALKPHOS 208* 237*   AST 89* 69*   ALT 90* 81*   ANIONGAP 10 8   ESTGFRAFRICA >60.0 >60.0   EGFRNONAA >60.0 >60.0    and CBC   Recent Labs   Lab 01/25/20  0358 01/26/20  0334   WBC 11.90 15.37*   HGB 11.4* 11.9*   HCT 34.9* 37.2   * 480*     Radiology: CT scan: CT " ABDOMEN PELVIS WITH CONTRAST: No results found for this visit on 01/18/20.    Pending Diagnostic Studies:     Procedure Component Value Units Date/Time    US Endoscopic Ultrasound [[id number]] Resulted:  01/20/20 1014    Order Status:  Sent Lab Status:  In process Updated:  01/20/20 1111         Medications:  Reconciled Home Medications:      Medication List      START taking these medications    amLODIPine 5 MG tablet  Commonly known as:  NORVASC  Take 1 tablet (5 mg total) by mouth once daily.     calcium carbonate 200 mg calcium (500 mg) chewable tablet  Commonly known as:  TUMS  Take 1 tablet (500 mg total) by mouth daily as needed.        CONTINUE taking these medications    Excedrin Extra Strength 250-250-65 mg per tablet  Generic drug:  aspirin-acetaminophen-caffeine 250-250-65 mg  Take 2 tablets by mouth daily as needed for Pain.     lisinopril-hydrochlorothiazide 20-12.5 mg per tablet  Commonly known as:  PRINZIDE,ZESTORETIC  Take 1 tablet by mouth once daily.     ondansetron 8 MG tablet  Commonly known as:  ZOFRAN  Take 8 mg by mouth every 8 (eight) hours.     pantoprazole 40 MG tablet  Commonly known as:  PROTONIX  Take 40 mg by mouth once daily.     promethazine 25 MG suppository  Commonly known as:  PHENERGAN  Place 25 mg rectally every 6 (six) hours as needed for Nausea.     sucralfate 1 gram tablet  Commonly known as:  CARAFATE  Take 1 g by mouth 4 (four) times daily.            Indwelling Lines/Drains at time of discharge:   Lines/Drains/Airways     None                   Zena Bolden MD  Department of Hospital Medicine  [facility]

## 2020-01-27 NOTE — PLAN OF CARE
Patient discharged home 1/26/2020 with no needs (pending medicaid so could not get home health).         01/27/20 0734   Final Note   Assessment Type Final Discharge Note   Anticipated Discharge Disposition Home   Right Care Referral Info   Post Acute Recommendation No Care

## 2020-01-29 ENCOUNTER — TELEPHONE (OUTPATIENT)
Dept: HEMATOLOGY/ONCOLOGY | Facility: CLINIC | Age: 65
End: 2020-01-29

## 2020-01-29 NOTE — TELEPHONE ENCOUNTER
----- Message from Macy Dia sent at 1/29/2020  3:31 PM CST -----  Contact: Liset london Niece#[phone]  Pt is calling to schedule consult for Pancreatic mass. Referral in epic. Pt has pending medicaid and she wants to see if she can schedule appt with the insurance pending. please call

## 2020-02-04 ENCOUNTER — OFFICE VISIT (OUTPATIENT)
Dept: PSYCHIATRY | Facility: CLINIC | Age: 65
End: 2020-02-04

## 2020-02-04 ENCOUNTER — LAB VISIT (OUTPATIENT)
Dept: LAB | Facility: HOSPITAL | Age: 65
End: 2020-02-04
Attending: INTERNAL MEDICINE

## 2020-02-04 ENCOUNTER — OFFICE VISIT (OUTPATIENT)
Dept: HEMATOLOGY/ONCOLOGY | Facility: CLINIC | Age: 65
End: 2020-02-04
Payer: MEDICARE

## 2020-02-04 VITALS
WEIGHT: 152.31 LBS | HEART RATE: 84 BPM | BODY MASS INDEX: 21.81 KG/M2 | DIASTOLIC BLOOD PRESSURE: 72 MMHG | SYSTOLIC BLOOD PRESSURE: 136 MMHG | OXYGEN SATURATION: 95 % | TEMPERATURE: 98 F | RESPIRATION RATE: 17 BRPM | HEIGHT: 70 IN

## 2020-02-04 DIAGNOSIS — F32.A DEPRESSION, UNSPECIFIED DEPRESSION TYPE: ICD-10-CM

## 2020-02-04 DIAGNOSIS — D49.89 NEOPLASM OF ABDOMEN: ICD-10-CM

## 2020-02-04 DIAGNOSIS — R63.4 WEIGHT LOSS: ICD-10-CM

## 2020-02-04 DIAGNOSIS — C25.9 PANCREATIC ADENOCARCINOMA: Primary | ICD-10-CM

## 2020-02-04 DIAGNOSIS — C25.9 PANCREATIC ADENOCARCINOMA: ICD-10-CM

## 2020-02-04 DIAGNOSIS — F43.23 ADJUSTMENT DISORDER WITH MIXED ANXIETY AND DEPRESSED MOOD: Primary | ICD-10-CM

## 2020-02-04 LAB
ALBUMIN SERPL BCP-MCNC: 2.8 G/DL (ref 3.5–5.2)
ALP SERPL-CCNC: 346 U/L (ref 55–135)
ALT SERPL W/O P-5'-P-CCNC: 42 U/L (ref 10–44)
ANION GAP SERPL CALC-SCNC: 13 MMOL/L (ref 8–16)
AST SERPL-CCNC: 45 U/L (ref 10–40)
BASOPHILS # BLD AUTO: 0.06 K/UL (ref 0–0.2)
BASOPHILS NFR BLD: 0.6 % (ref 0–1.9)
BILIRUB SERPL-MCNC: 0.7 MG/DL (ref 0.1–1)
BUN SERPL-MCNC: 14 MG/DL (ref 8–23)
CALCIUM SERPL-MCNC: 9.4 MG/DL (ref 8.7–10.5)
CANCER AG19-9 SERPL-ACNC: ABNORMAL U/ML (ref 2–40)
CHLORIDE SERPL-SCNC: 93 MMOL/L (ref 95–110)
CO2 SERPL-SCNC: 27 MMOL/L (ref 23–29)
CREAT SERPL-MCNC: 0.7 MG/DL (ref 0.5–1.4)
DIFFERENTIAL METHOD: ABNORMAL
EOSINOPHIL # BLD AUTO: 0.1 K/UL (ref 0–0.5)
EOSINOPHIL NFR BLD: 1.2 % (ref 0–8)
ERYTHROCYTE [DISTWIDTH] IN BLOOD BY AUTOMATED COUNT: 14.1 % (ref 11.5–14.5)
EST. GFR  (AFRICAN AMERICAN): >60 ML/MIN/1.73 M^2
EST. GFR  (NON AFRICAN AMERICAN): >60 ML/MIN/1.73 M^2
GLUCOSE SERPL-MCNC: 147 MG/DL (ref 70–110)
HCT VFR BLD AUTO: 38.1 % (ref 37–48.5)
HGB BLD-MCNC: 12.5 G/DL (ref 12–16)
IMM GRANULOCYTES # BLD AUTO: 0.06 K/UL (ref 0–0.04)
IMM GRANULOCYTES NFR BLD AUTO: 0.6 % (ref 0–0.5)
LYMPHOCYTES # BLD AUTO: 1.5 K/UL (ref 1–4.8)
LYMPHOCYTES NFR BLD: 13.6 % (ref 18–48)
MCH RBC QN AUTO: 30.3 PG (ref 27–31)
MCHC RBC AUTO-ENTMCNC: 32.8 G/DL (ref 32–36)
MCV RBC AUTO: 92 FL (ref 82–98)
MONOCYTES # BLD AUTO: 0.9 K/UL (ref 0.3–1)
MONOCYTES NFR BLD: 8.5 % (ref 4–15)
NEUTROPHILS # BLD AUTO: 8.1 K/UL (ref 1.8–7.7)
NEUTROPHILS NFR BLD: 75.5 % (ref 38–73)
NRBC BLD-RTO: 0 /100 WBC
PLATELET # BLD AUTO: 354 K/UL (ref 150–350)
PMV BLD AUTO: 9.7 FL (ref 9.2–12.9)
POTASSIUM SERPL-SCNC: 3 MMOL/L (ref 3.5–5.1)
PROT SERPL-MCNC: 7.4 G/DL (ref 6–8.4)
RBC # BLD AUTO: 4.13 M/UL (ref 4–5.4)
SODIUM SERPL-SCNC: 133 MMOL/L (ref 136–145)
WBC # BLD AUTO: 10.66 K/UL (ref 3.9–12.7)

## 2020-02-04 PROCEDURE — 90791 PSYCH DIAGNOSTIC EVALUATION: CPT | Mod: S$GLB,,, | Performed by: PSYCHOLOGIST

## 2020-02-04 PROCEDURE — 99212 OFFICE O/P EST SF 10 MIN: CPT | Mod: PBBFAC,27 | Performed by: PSYCHOLOGIST

## 2020-02-04 PROCEDURE — 90791 PR PSYCHIATRIC DIAGNOSTIC EVALUATION: ICD-10-PCS | Mod: S$GLB,,, | Performed by: PSYCHOLOGIST

## 2020-02-04 PROCEDURE — 99999 PR PBB SHADOW E&M-EST. PATIENT-LVL II: ICD-10-PCS | Mod: PBBFAC,,, | Performed by: PSYCHOLOGIST

## 2020-02-04 PROCEDURE — 99999 PR PBB SHADOW E&M-EST. PATIENT-LVL II: CPT | Mod: PBBFAC,,, | Performed by: PSYCHOLOGIST

## 2020-02-04 PROCEDURE — 99205 OFFICE O/P NEW HI 60 MIN: CPT | Mod: S$PBB,,, | Performed by: INTERNAL MEDICINE

## 2020-02-04 PROCEDURE — 36415 COLL VENOUS BLD VENIPUNCTURE: CPT

## 2020-02-04 PROCEDURE — 99999 PR PBB SHADOW E&M-EST. PATIENT-LVL V: CPT | Mod: PBBFAC,,, | Performed by: INTERNAL MEDICINE

## 2020-02-04 PROCEDURE — 99215 OFFICE O/P EST HI 40 MIN: CPT | Mod: PBBFAC,27 | Performed by: INTERNAL MEDICINE

## 2020-02-04 PROCEDURE — 99205 PR OFFICE/OUTPT VISIT, NEW, LEVL V, 60-74 MIN: ICD-10-PCS | Mod: S$PBB,,, | Performed by: INTERNAL MEDICINE

## 2020-02-04 PROCEDURE — 80053 COMPREHEN METABOLIC PANEL: CPT

## 2020-02-04 PROCEDURE — 86301 IMMUNOASSAY TUMOR CA 19-9: CPT

## 2020-02-04 PROCEDURE — 85025 COMPLETE CBC W/AUTO DIFF WBC: CPT

## 2020-02-04 PROCEDURE — 99999 PR PBB SHADOW E&M-EST. PATIENT-LVL V: ICD-10-PCS | Mod: PBBFAC,,, | Performed by: INTERNAL MEDICINE

## 2020-02-04 RX ORDER — ZOLPIDEM TARTRATE 10 MG/1
5 TABLET ORAL NIGHTLY PRN
Status: ON HOLD | COMMUNITY
End: 2020-03-03 | Stop reason: SDUPTHER

## 2020-02-04 NOTE — PROGRESS NOTES
Subjective:       Patient ID: Radha Alcazar is a 64 y.o. female.    Chief Complaint: Pancreatic Cancer; Fatigue; and Depression    Patient presents for evaluation of pancreatic cancer.  She presented to the hospital secondary to weight loss and was admitted to [facility] on 1/18/2020 for biopsy of a pancreatic head mass.  She was initially treated for a small bowel obstruction with NG tube decompression.  An EGD was performed, where a biopsy was obtained and a duodenal stent was placed.  Biopsy results revealed adenocarcinoma but unfortunately CT scans were concerning for possible metastatic disease.  She does feel better after the stent was placed and denies any current issues with swallowing.  She denies abdominal pain, nausea or vomiting at this time.  She denies any CNS symptoms.  The family does report continued weight loss which she relates to depression.  Denies jaundice or icterus.       HPI  Review of Systems   Constitutional: Positive for activity change, appetite change, fatigue and unexpected weight change. Negative for chills, diaphoresis and fever.   HENT: Negative for facial swelling, mouth sores and sore throat.    Respiratory: Negative for apnea, cough, choking and shortness of breath.    Cardiovascular: Negative for chest pain and leg swelling.   Gastrointestinal: Negative for abdominal distention, abdominal pain, blood in stool, constipation, nausea and vomiting.   Endocrine: Negative for cold intolerance.   Genitourinary: Negative for difficulty urinating, hematuria, vaginal bleeding and vaginal discharge.   Musculoskeletal: Negative for back pain, joint swelling and neck pain.   Skin: Negative for color change, rash and wound.   Neurological: Negative for dizziness, speech difficulty, weakness, numbness and headaches.   Hematological: Negative for adenopathy. Does not bruise/bleed easily.   Psychiatric/Behavioral: Negative for agitation, confusion, decreased concentration and  hallucinations. The patient is not hyperactive.        Objective:      Physical Exam   Constitutional: She is oriented to person, place, and time. No distress.   Chronically ill appearing   HENT:   Head: Normocephalic and atraumatic.   Eyes: Pupils are equal, round, and reactive to light. EOM are normal.   Neck: Normal range of motion. No tracheal deviation present.   Cardiovascular: Normal rate, regular rhythm and normal heart sounds.   No murmur heard.  Pulmonary/Chest: Effort normal. No stridor. No respiratory distress. She has no wheezes. She has no rales.   Slightly decreased breath sounds bilateral bases   Abdominal: Soft. Bowel sounds are normal. She exhibits no distension and no mass. There is no tenderness. There is no rebound and no guarding.   Musculoskeletal: Normal range of motion. She exhibits no edema or deformity.   Lymphadenopathy:     She has no cervical adenopathy.   Neurological: She is alert and oriented to person, place, and time. No cranial nerve deficit. Coordination normal.   Skin: No rash noted. She is not diaphoretic. No erythema. No pallor.   Psychiatric: Her behavior is normal. Judgment and thought content normal.   Poor affect   Vitals reviewed.      Vitals:    02/04/20 1411   BP: 136/72   Pulse: 84   Resp: 17   Temp: 98 °F (36.7 °C)       Current Outpatient Medications on File Prior to Visit   Medication Sig Dispense Refill    aspirin-acetaminophen-caffeine 250-250-65 mg (EXCEDRIN EXTRA STRENGTH) 250-250-65 mg per tablet Take 2 tablets by mouth daily as needed for Pain.      calcium carbonate (TUMS) 200 mg calcium (500 mg) chewable tablet Take 1 tablet (500 mg total) by mouth daily as needed.      famotidine (PEPCID) 20 MG tablet Take 1 tablet (20 mg total) by mouth daily as needed (heart burn). 21 tablet 3    HYDROcodone-acetaminophen (NORCO) 5-325 mg per tablet Take 1 tablet by mouth every 6 (six) hours as needed. 28 tablet 0    lisinopril-hydrochlorothiazide  (PRINZIDE,ZESTORETIC) 20-12.5 mg per tablet Take 1 tablet by mouth once daily.      ondansetron (ZOFRAN) 8 MG tablet Take 8 mg by mouth every 8 (eight) hours.      pantoprazole (PROTONIX) 40 MG tablet Take 40 mg by mouth once daily.      promethazine (PHENERGAN) 25 MG suppository Place 25 mg rectally every 6 (six) hours as needed for Nausea.      zolpidem (AMBIEN) 10 mg Tab Take 5 mg by mouth nightly as needed.      amLODIPine (NORVASC) 5 MG tablet Take 1 tablet (5 mg total) by mouth once daily. 30 tablet 11    sucralfate (CARAFATE) 1 gram tablet Take 1 g by mouth 4 (four) times daily.       No current facility-administered medications on file prior to visit.         Past Medical History:   Diagnosis Date    Hypertension     Pancreatic cancer      Past Surgical History:   Procedure Laterality Date    APPENDECTOMY      ENDOSCOPIC ULTRASOUND OF UPPER GASTROINTESTINAL TRACT N/A 1/20/2020    Procedure: ULTRASOUND, UPPER GI TRACT, ENDOSCOPIC;  Surgeon: Toni Messer MD;  Location: [facility] ([address] [place] [address]);  Service: Endoscopy;  Laterality: N/A;    ESOPHAGOGASTRODUODENOSCOPY N/A 1/22/2020    Procedure: EGD (ESOPHAGOGASTRODUODENOSCOPY);  Surgeon: Cory Servin MD;  Location: [facility] ([address] [place] [address]);  Service: Endoscopy;  Laterality: N/A;    intestinal stent       Social History     Socioeconomic History    Marital status:      Spouse name: Not on file    Number of children: Not on file    Years of education: Not on file    Highest education level: Not on file   Occupational History    Not on file   Social Needs    Financial resource strain: Not on file    Food insecurity:     Worry: Not on file     Inability: Not on file    Transportation needs:     Medical: Not on file     Non-medical: Not on file   Tobacco Use    Smoking status: Never Smoker    Smokeless tobacco: Never Used   Substance and Sexual Activity    Alcohol use: Never     Frequency: Never    Drug use: Never    Sexual activity: Not on  file   Lifestyle    Physical activity:     Days per week: Not on file     Minutes per session: Not on file    Stress: Not on file   Relationships    Social connections:     Talks on phone: Not on file     Gets together: Not on file     Attends Jain service: Not on file     Active member of club or organization: Not on file     Attends meetings of clubs or organizations: Not on file     Relationship status: Not on file   Other Topics Concern    Not on file   Social History Narrative    Not on file       Family History   Problem Relation Age of Onset    Colon cancer Mother     Breast cancer Mother     Prostate cancer Brother     Pancreatic cancer Maternal Uncle          Assessment:       1. Pancreatic adenocarcinoma    2. Neoplasm of abdomen    3. Weight loss    4. Depression, unspecified depression type        Plan:       64 year old female with newly diagnosed pancreatic cancer.     #1 Pancreatic cancer:  I had a long discussion with the patient and her family today.  We did discuss the natural course of this disease and discussed options including chemo vs hospice care.  I would like to obtain a PET scan especially given that the chest CT was without contrast and they are certainly concerning for metastatic disease.  She is unsure if she wants palliative chemotherapy and she is aware that this is not a curable disease process.  I have reviewed the images with them today.  I will see her back after her PET scan.  I am sending a CA 19-9, CBC and CMP today.    #2 weight loss:  I have sent a referral to our dietian today.  I did talk to them about supplement use.    #3 depression:  I have sent a referral to psychology today and the patient is aware of that.      Addendum:  2/5/2020  I have spoken with Dr Branch regarding the patients depression.  She has recommended remeron for treatment and this has been ordered.  We will be in contact with the patient regarding this medication.

## 2020-02-04 NOTE — LETTER
February 4, 2020      Pavan Camarillo MD  [address]  [place] [address]           [facility]  [address]  [facility] [address]  Phone: [phone]          Patient: Radha Alcazar   MR Number: [id number]   YOB: 1955   Date of Visit: 2/4/2020       Dear Dr. Pavan Camarillo:    Thank you for referring Radha Alcazar to me for evaluation. Attached you will find relevant portions of my assessment and plan of care.    If you have questions, please do not hesitate to call me. I look forward to following Radha Alcazar along with you.    Sincerely,    ROBERT Gutiérrez MD    Enclosure  CC:  No Recipients    If you would like to receive this communication electronically, please contact [email or web address] or [phone] to request more information on [employer] Link access.    For providers and/or their staff who would like to refer a patient to [facility], please contact us through our one-stop-shop provider referral line, [facility], at [phone].    If you feel you have received this communication in error or would no longer like to receive these types of communications, please e-mail [email or web address]

## 2020-02-04 NOTE — LETTER
February 6, 2020        ROBERT Gutiérrez MD  [address] [facility] [address]             [facility]  [address] [facility] [address]  Phone: [phone]  Fax: [phone]   Patient: Radha Alcazar   MR Number: [id number]   YOB: 1955   Date of Visit: 2/4/2020       Dear Dr. Gutiérrez:    Thank you for referring Radha Alcazar to me for evaluation. Below are the relevant portions of my assessment and plan of care.     Radha Alcazar is a 64 y.o. female from [place], [place] referred to me by Dr. Gutiérrez for psychological evaluation and treatment.  Ms. Alcazar appears to be coping with difficulty with her diagnosis/treatment/prognosis. She is experiencing significant anxiety and depression.  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 She may benefit from consideration of psychotropic medication, if medically appropriate. She was willing to follow up with me for supportive therapy/CBT when she comes to [place] for medical visits.       If you have questions, please do not hesitate to call me. I look forward to following Radha along with you.    Sincerely,      Jeff Branch, PhD           CC  No Recipients

## 2020-02-05 ENCOUNTER — TELEPHONE (OUTPATIENT)
Dept: HEMATOLOGY/ONCOLOGY | Facility: CLINIC | Age: 65
End: 2020-02-05

## 2020-02-05 RX ORDER — MIRTAZAPINE 15 MG/1
15 TABLET, FILM COATED ORAL NIGHTLY
Qty: 30 TABLET | Refills: 2 | Status: SHIPPED | OUTPATIENT
Start: 2020-02-05 | End: 2021-02-04

## 2020-02-05 NOTE — TELEPHONE ENCOUNTER
Left  for patient informing her dr esteban sent remeron over to her [facility] pharmacy. Instructed her to take medication at night, as it may make her drowsy.

## 2020-02-05 NOTE — PROGRESS NOTES
PSYCHO-ONCOLOGY INTAKE    Diagnostic Interview - CPT 31733    Date: 2/4/2020  Site: [facility]     Evaluation Length (direct face-to-face time):  30 minutes     Referral Source:Oncologist:  ROBERT Gutiérrez, *    PCP: Hossein Mortensen MD    Clinical status of patient: Outpatient    Radha Alcazar, a 64 y.o. female, seen for initial evaluation visit.  Met with patient.    Chief complaint/reason for encounter: adjustment to illness, depression    Medical/Surgical History:    Patient Active Problem List   Diagnosis    Abdominal pain    Hypertension    Intractable nausea and vomiting    Pancreatic mass    Lung consolidation    Pleural effusion    Abnormal CT of the abdomen    Hypokalemia    Transaminitis    Leukocytosis    Normocytic anemia    Depressed mood    Severe malnutrition    Pancreatic adenocarcinoma    Weight loss    Depression    Adjustment disorder with mixed anxiety and depressed mood       Health Behaviors:       ETOH Use: No      Tobacco Use: No   Illicit Drug Use:  No     Prescription Misuse:No   Caffeine: minimal (rare soda)   Exercise:The patient engages in environmental activity only.   Advanced directives:No (not discussed at this visit)     Family History:   Psychiatric illness: Yes  (daughter depression, on meds-? Name)   Alcohol/Drug Abuse: No     Suicide: No      Past Psychiatric History:   Inpatient treatment: No     Outpatient treatment: No     Prior substance abuse treatment: No     Suicide Attempts: No     Psychotropic Medications:  Current: Ambien  (since recent hospitalization, only)      Past: none    Current medications as per below, allergies reviewed in chart.    Current Outpatient Medications   Medication    amLODIPine (NORVASC) 5 MG tablet    aspirin-acetaminophen-caffeine 250-250-65 mg (EXCEDRIN EXTRA STRENGTH) 250-250-65 mg per tablet    calcium carbonate (TUMS) 200 mg calcium (500 mg) chewable tablet    famotidine (PEPCID) 20 MG tablet     HYDROcodone-acetaminophen (NORCO) 5-325 mg per tablet    lisinopril-hydrochlorothiazide (PRINZIDE,ZESTORETIC) 20-12.5 mg per tablet    mirtazapine (REMERON) 15 MG tablet    ondansetron (ZOFRAN) 8 MG tablet    pantoprazole (PROTONIX) 40 MG tablet    promethazine (PHENERGAN) 25 MG suppository    sucralfate (CARAFATE) 1 gram tablet    zolpidem (AMBIEN) 10 mg Tab     No current facility-administered medications for this visit.         CAM Therapies: None     Screening:  Alejandra: Denies Psychosis: Denies    Generalized anxiety: Denies  (Standardized anxiety screening used- YANETH-7= does not meet screener)    Panic Disorder: Denies current (panic attacks in distant past)   Social/specific phobia: Denies Trauma: Denies      Social situation/Stressors: Radha Alaczar lives with her brother and sister in law in [place], [place].  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  She previously worked at [employer] for 30 years.    Radha Alcazar has been  2x (currently 29 years) and has 3 adult children ([facility], [facility], [facility]).  The patient reports primary emotional support from her brother and niece.  Radha Alcazar is an active member of the [employer].  Radha Alcazar's hobbies include shopping, reading, and playing games on her phone.  Additional stressors:  in hospice due to dementia (no longer remembers her)    Was caregiver for mother when she had breast/colon cancer prior to her death    Strengths:Housing stability, Interpersonal relationships and supports available - family, relatives, friends and Financial stability  Liabilities: Complicated medical illness    Current Evaluation:     Mental Status Exam: Radha Alcazar was seen following her oncology appointment at her physician's request. Her brother and niece were also present in the waiting room, but did not  "participate in the interview.  The patient was superfically cooperative throughout the interview.  Her adequacy as a historian was limited by minimal participation  Appearance: age appropriate, casually  dressed, adequately  groomed  Behavior/Cooperation: superficially cooperative  Speech: inflexionless, minimal output  Mood: depressed  Affect: flat  Thought Process: goal-directed, logical  Thought Content: normal, no suicidality, no homicidality, delusions, or paranoia;did not appear to be responding to internal stimuli during the interview.   Orientation: grossly intact  Memory: Grossly intact  Attention Span/Concentration: Attends to interview without distraction; reports subjective difficulty  Fund of Knowledge: average  Estimate of Intelligence: average from verbal skills and history  Cognition: grossly intact  Insight: patient has awareness of illness; good insight into own behavior and behavior of others  Judgment: the patient's behavior is adequate to circumstances    Distress Score    Distress Score: 10        Practical Problems Physical Problems               Insurance / Financial: Yes                 Constipation: Yes   Treatment Decisions: Yes  Diarrhea: Yes     Eating: Yes    Family Problems Fatigue: Yes           Dealing with Partner: Yes         Getting Around: Yes       Indigestion: Yes         Emotional Problems      Depression: Yes       Fears: Yes               Sadness: Yes      Worry: Yes      Loss of Interest in Usual Activities: Yes Sleep: Yes          Spiritual/Religions Concerns               Other Problems            MMSE:     Pain: 0; highest "2 or 3"; taking 1-2 Norco per day to prevent pain increases   Pain catastrophizing: No elevation in PCS total score, helplessness, magnification, or rumination.     History of present illness:  As per Dr. Gutiérrez: Patient presents for evaluation of pancreatic cancer.  She presented to the hospital secondary to weight loss and was admitted to [facility] on 1/18/2020 for biopsy of a pancreatic head mass.  She was initially treated for a small bowel obstruction with NG tube decompression.  An EGD was performed, where a biopsy was obtained and a duodenal stent was placed.  Biopsy results revealed adenocarcinoma but unfortunately CT scans were concerning for possible metastatic disease.    Radha Alcazar has adjusted to illness with difficulty primarily through passive coping strategies. She has engaged in limited information gathering.  The patient has adequate family/friend support (pirmarily brother and niece).  Her support system is coping adequately with the diagnosis/treatment/prognosis. Her  has no awareness of her illness, as he no longer recognizes her or any other family members. The status of her relationship with her children is unclear (did mention that daughter "has a very busy life"). Illness-related psychosocial stressors include difficulty meeting family responsibilities.  The patient has a new and growing partnership with her [facility] oncology treatment team. The patient reports the following barriers to cancer care: distance from the hospital.   Symptoms:   · Mood: depressed mood, diminished interest, weight loss, insomnia, psychomotor retardation, fatigue, worthlessness/guilt, poor concentration and tearfulness;  no prior and no SI/HI; PHQ-9=24  · Anxiety: Uncontrollable worry (about health, ), Excessive worry (interfering with mood), Difficulty relaxing and Irritability; no prior;  YANETH-7=12  · Substance abuse: denied  · Cognitive functioning: denied  · Health behaviors: noncontributory  · Sleep: Increased struggle with sleep since diagnosis, history of onset insomnia; currently taking Ambien with moderate result (6-8 hrs/night); restless sleep  and non-restorative sleep , multiple x WASO (with re-onset difficulty), (+) EDS  and no naps , rare caffeine/stimulants  and (+) psychophysiological factors; (+) use of " "melatonin in past- "didn't help"         Assessment - Diagnosis - Goals:       ICD-10-CM ICD-9-CM   1. Pancreatic adenocarcinoma C25.9 157.9   2. Adjustment disorder with mixed anxiety and depressed mood F43.23 309.28       Plan:individual psychotherapy and medication management by physician    Summary and Recommendations  Radha Alcazar is a 64 y.o. female from [place], [place] referred to me by Dr. Gutiérrez for psychological evaluation and treatment.  Ms. Alcazar appears to be coping with difficulty with her diagnosis/treatment/prognosis. She is experiencing significant anxiety and depression.  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 She may benefit from consideration of psychotropic medication, if medically appropriate. She was willing to follow up with me for supportive therapy/CBT when she comes to [place] for medical visits.      GOALS:   Discuss family interactions/connection at next visit    Jeff De La Torre, PhD  Clinical Psychologist  [place] License #[id number]        "

## 2020-02-06 PROBLEM — F43.23 ADJUSTMENT DISORDER WITH MIXED ANXIETY AND DEPRESSED MOOD: Status: ACTIVE | Noted: 2020-02-06

## 2020-02-07 ENCOUNTER — TELEPHONE (OUTPATIENT)
Dept: HEMATOLOGY/ONCOLOGY | Facility: CLINIC | Age: 65
End: 2020-02-07

## 2020-02-07 NOTE — TELEPHONE ENCOUNTER
Called Liset to reschedule appointment she states she was in a doctors office and would have to call back.  Added patient to schedule on 2/11 at 3:30 incase she wants that time.    ----- Message from Lorrie Quinn RD sent at 2/7/2020  3:51 PM CST -----  Contact: Ms Hutchins   [phone]  [place],  Can you assist with this? I think I might only have a 9am slot that day    ----- Message -----  From: Celi Sandoval  Sent: 2/5/2020   2:20 PM CST  To: Lorrie Quinn RD    Type:  Reschedule  Appointment Request    Name of Caller:patient need to reschedule appointment for Tuesday 02/11/2020 around 10am to 12:30pm   When is the first available appointment?  Symptoms:    Best Call Back Number:[phone]  Additional Information: please call Ms Hutchins to let know done

## 2020-02-08 ENCOUNTER — NURSE TRIAGE (OUTPATIENT)
Dept: ADMINISTRATIVE | Facility: CLINIC | Age: 65
End: 2020-02-08

## 2020-02-09 ENCOUNTER — HOSPITAL ENCOUNTER (EMERGENCY)
Facility: HOSPITAL | Age: 65
Discharge: HOME | End: 2020-02-09
Attending: EMERGENCY MEDICINE
Payer: MEDICARE

## 2020-02-09 VITALS
DIASTOLIC BLOOD PRESSURE: 89 MMHG | TEMPERATURE: 98 F | BODY MASS INDEX: 21.66 KG/M2 | HEART RATE: 90 BPM | WEIGHT: 154.75 LBS | OXYGEN SATURATION: 97 % | RESPIRATION RATE: 18 BRPM | SYSTOLIC BLOOD PRESSURE: 186 MMHG | HEIGHT: 71 IN

## 2020-02-09 DIAGNOSIS — R11.0 NAUSEA: ICD-10-CM

## 2020-02-09 DIAGNOSIS — R10.9 ABDOMINAL PAIN: Primary | ICD-10-CM

## 2020-02-09 DIAGNOSIS — N39.0 URINARY TRACT INFECTION WITHOUT HEMATURIA, SITE UNSPECIFIED: ICD-10-CM

## 2020-02-09 LAB
ALBUMIN SERPL BCP-MCNC: 2.6 G/DL (ref 3.5–5.2)
ALP SERPL-CCNC: 380 U/L (ref 55–135)
ALT SERPL W/O P-5'-P-CCNC: 60 U/L (ref 10–44)
ANION GAP SERPL CALC-SCNC: 10 MMOL/L (ref 8–16)
AST SERPL-CCNC: 57 U/L (ref 10–40)
BACTERIA #/AREA URNS AUTO: ABNORMAL /HPF
BASOPHILS # BLD AUTO: 0.06 K/UL (ref 0–0.2)
BASOPHILS NFR BLD: 0.6 % (ref 0–1.9)
BILIRUB SERPL-MCNC: 0.9 MG/DL (ref 0.1–1)
BILIRUB UR QL STRIP: NEGATIVE
BUN SERPL-MCNC: 17 MG/DL (ref 8–23)
CALCIUM SERPL-MCNC: 9.6 MG/DL (ref 8.7–10.5)
CHLORIDE SERPL-SCNC: 94 MMOL/L (ref 95–110)
CLARITY UR REFRACT.AUTO: ABNORMAL
CO2 SERPL-SCNC: 31 MMOL/L (ref 23–29)
COLOR UR AUTO: ABNORMAL
CREAT SERPL-MCNC: 0.8 MG/DL (ref 0.5–1.4)
DIFFERENTIAL METHOD: ABNORMAL
EOSINOPHIL # BLD AUTO: 0.2 K/UL (ref 0–0.5)
EOSINOPHIL NFR BLD: 1.6 % (ref 0–8)
ERYTHROCYTE [DISTWIDTH] IN BLOOD BY AUTOMATED COUNT: 14.8 % (ref 11.5–14.5)
EST. GFR  (AFRICAN AMERICAN): >60 ML/MIN/1.73 M^2
EST. GFR  (NON AFRICAN AMERICAN): >60 ML/MIN/1.73 M^2
GLUCOSE SERPL-MCNC: 145 MG/DL (ref 70–110)
GLUCOSE UR QL STRIP: NEGATIVE
HCT VFR BLD AUTO: 36.2 % (ref 37–48.5)
HGB BLD-MCNC: 11.6 G/DL (ref 12–16)
HGB UR QL STRIP: NEGATIVE
IMM GRANULOCYTES # BLD AUTO: 0.07 K/UL (ref 0–0.04)
IMM GRANULOCYTES NFR BLD AUTO: 0.7 % (ref 0–0.5)
KETONES UR QL STRIP: NEGATIVE
LEUKOCYTE ESTERASE UR QL STRIP: ABNORMAL
LIPASE SERPL-CCNC: 17 U/L (ref 4–60)
LYMPHOCYTES # BLD AUTO: 1.6 K/UL (ref 1–4.8)
LYMPHOCYTES NFR BLD: 15.2 % (ref 18–48)
MCH RBC QN AUTO: 30.7 PG (ref 27–31)
MCHC RBC AUTO-ENTMCNC: 32 G/DL (ref 32–36)
MCV RBC AUTO: 96 FL (ref 82–98)
MICROSCOPIC COMMENT: ABNORMAL
MONOCYTES # BLD AUTO: 1.1 K/UL (ref 0.3–1)
MONOCYTES NFR BLD: 10.7 % (ref 4–15)
NEUTROPHILS # BLD AUTO: 7.4 K/UL (ref 1.8–7.7)
NEUTROPHILS NFR BLD: 71.2 % (ref 38–73)
NITRITE UR QL STRIP: NEGATIVE
NON-SQ EPI CELLS #/AREA URNS AUTO: 1 /HPF
NRBC BLD-RTO: 0 /100 WBC
PH UR STRIP: 5 [PH] (ref 5–8)
PLATELET # BLD AUTO: 304 K/UL (ref 150–350)
PMV BLD AUTO: 9.6 FL (ref 9.2–12.9)
POTASSIUM SERPL-SCNC: 3.4 MMOL/L (ref 3.5–5.1)
PROT SERPL-MCNC: 7.2 G/DL (ref 6–8.4)
PROT UR QL STRIP: NEGATIVE
RBC # BLD AUTO: 3.78 M/UL (ref 4–5.4)
RBC #/AREA URNS AUTO: 3 /HPF (ref 0–4)
SODIUM SERPL-SCNC: 135 MMOL/L (ref 136–145)
SP GR UR STRIP: 1.02 (ref 1–1.03)
SQUAMOUS #/AREA URNS AUTO: 5 /HPF
URN SPEC COLLECT METH UR: ABNORMAL
WBC # BLD AUTO: 10.32 K/UL (ref 3.9–12.7)
WBC #/AREA URNS AUTO: 53 /HPF (ref 0–5)

## 2020-02-09 PROCEDURE — 99285 PR EMERGENCY DEPT VISIT,LEVEL V: ICD-10-PCS | Mod: ,,, | Performed by: EMERGENCY MEDICINE

## 2020-02-09 PROCEDURE — 63600175 PHARM REV CODE 636 W HCPCS: Performed by: EMERGENCY MEDICINE

## 2020-02-09 PROCEDURE — 81001 URINALYSIS AUTO W/SCOPE: CPT

## 2020-02-09 PROCEDURE — 99285 EMERGENCY DEPT VISIT HI MDM: CPT | Mod: ,,, | Performed by: EMERGENCY MEDICINE

## 2020-02-09 PROCEDURE — 80053 COMPREHEN METABOLIC PANEL: CPT

## 2020-02-09 PROCEDURE — 96375 TX/PRO/DX INJ NEW DRUG ADDON: CPT

## 2020-02-09 PROCEDURE — 85025 COMPLETE CBC W/AUTO DIFF WBC: CPT

## 2020-02-09 PROCEDURE — 96376 TX/PRO/DX INJ SAME DRUG ADON: CPT

## 2020-02-09 PROCEDURE — 99285 EMERGENCY DEPT VISIT HI MDM: CPT | Mod: 25

## 2020-02-09 PROCEDURE — 25000003 PHARM REV CODE 250: Performed by: EMERGENCY MEDICINE

## 2020-02-09 PROCEDURE — 25500020 PHARM REV CODE 255: Performed by: EMERGENCY MEDICINE

## 2020-02-09 PROCEDURE — 96374 THER/PROPH/DIAG INJ IV PUSH: CPT

## 2020-02-09 PROCEDURE — 87086 URINE CULTURE/COLONY COUNT: CPT

## 2020-02-09 PROCEDURE — 83690 ASSAY OF LIPASE: CPT

## 2020-02-09 RX ORDER — ONDANSETRON 2 MG/ML
4 INJECTION INTRAMUSCULAR; INTRAVENOUS
Status: COMPLETED | OUTPATIENT
Start: 2020-02-09 | End: 2020-02-09

## 2020-02-09 RX ORDER — HYDROCODONE BITARTRATE AND ACETAMINOPHEN 10; 325 MG/1; MG/1
TABLET ORAL
Status: ON HOLD | COMMUNITY
End: 2020-03-03 | Stop reason: SDUPTHER

## 2020-02-09 RX ORDER — ONDANSETRON 4 MG/1
4 TABLET, ORALLY DISINTEGRATING ORAL EVERY 8 HOURS PRN
Qty: 30 TABLET | Refills: 0 | Status: SHIPPED | OUTPATIENT
Start: 2020-02-09

## 2020-02-09 RX ORDER — NITROFURANTOIN 25; 75 MG/1; MG/1
100 CAPSULE ORAL 2 TIMES DAILY
Qty: 10 CAPSULE | Refills: 0 | Status: SHIPPED | OUTPATIENT
Start: 2020-02-09 | End: 2020-02-14

## 2020-02-09 RX ORDER — NITROFURANTOIN 25; 75 MG/1; MG/1
100 CAPSULE ORAL
Status: COMPLETED | OUTPATIENT
Start: 2020-02-09 | End: 2020-02-09

## 2020-02-09 RX ORDER — MORPHINE SULFATE 2 MG/ML
6 INJECTION, SOLUTION INTRAMUSCULAR; INTRAVENOUS
Status: COMPLETED | OUTPATIENT
Start: 2020-02-09 | End: 2020-02-09

## 2020-02-09 RX ADMIN — IOHEXOL 15 ML: 350 INJECTION, SOLUTION INTRAVENOUS at 06:02

## 2020-02-09 RX ADMIN — ONDANSETRON 4 MG: 2 INJECTION INTRAMUSCULAR; INTRAVENOUS at 05:02

## 2020-02-09 RX ADMIN — IOHEXOL 75 ML: 350 INJECTION, SOLUTION INTRAVENOUS at 06:02

## 2020-02-09 RX ADMIN — ONDANSETRON 4 MG: 2 INJECTION INTRAMUSCULAR; INTRAVENOUS at 08:02

## 2020-02-09 RX ADMIN — NITROFURANTOIN MONOHYDRATE/MACROCRYSTALLINE 100 MG: 25; 75 CAPSULE ORAL at 08:02

## 2020-02-09 RX ADMIN — MORPHINE SULFATE 6 MG: 2 INJECTION, SOLUTION INTRAMUSCULAR; INTRAVENOUS at 04:02

## 2020-02-09 NOTE — TELEPHONE ENCOUNTER
Reason for Disposition   [1] SEVERE post-op pain (e.g., excruciating, pain scale 8-10) AND [2] not controlled with pain medications    Additional Information   Negative: Sounds like a life-threatening emergency to the triager   Negative: Chest pain   Negative: Difficulty breathing   Negative: Surgical incision symptoms and questions   Negative: [1] Discomfort (pain, burning or stinging) when passing urine AND [2] male   Negative: [1] Discomfort (pain, burning or stinging) when passing urine AND [2] female   Negative: Constipation   Negative: New or worsening leg (calf, thigh) pain   Negative: New or worsening leg swelling   Negative: Dizziness is severe, or persists > 24 hours after surgery   Negative: Pain, redness, swelling, or pus at IV Site   Negative: Symptoms arising from use of a urinary catheter (Fox or Coude)   Negative: Cast problems or questions   Negative: Medication question   Negative: [1] Widespread rash AND [2] bright red, sunburn-like   Negative: [1] Severe headache AND [2] after spinal (epidural) anesthesia   Negative: [1] Vomiting AND [2] persists > 4 hours   Negative: [1] Vomiting AND [2] abdomen looks much more swollen than usual   Negative: [1] Drinking very little AND [2] dehydration suspected (e.g., no urine > 12 hours, very dry mouth, very lightheaded)   Negative: Patient sounds very sick or weak to the triager   Negative: Sounds like a serious complication to the triager   Negative: Fever > 100.5 F (38.1 C)    Protocols used: POST-OP SYMPTOMS AND [id number]  Stent placed in intestine week before last. Pt has panc CA.  having L pain under rib steady since earlier today getting worse .Took two pain pills already, was vomiting last pm. Ate a good bit yest. afeb. hector fluids today. Pain rated 8. Spoke with with dr de anda rec ED. pt states pain eases up some. Reiterate [facility] ED. Family & pt noticed. Family states that they will get back with us. call back with  questions

## 2020-02-09 NOTE — ED PROVIDER NOTES
Chief complaint:  Post-op Problem (had stent placed, stage 2 pancreatic cancer, not taking chemo)      HPI:  Radha Alcazar is a 64 y.o. female presenting with left upper quadrant abdominal pain that began last night.  Patient reports that when the pain 1st began it started suddenly and resolved when she took 1 of her pain medications.  It awoke her around 2:40 a.m. and again resolved with a pain medication.  She describes it as a spasm type pain under her left ribs.  It is made worse with coughing.  She describes that she has had a cough since she left the hospital.  She was discharged January 26th.  Her cough is nonproductive.  She denies shortness of breath. The symptoms are different than when she presented with the small-bowel obstruction.  At that time she had vomiting but no abdominal pain. Two nights ago she reports that she had vomiting but that occurred after eating a very heavy meal.  Yesterday she had no further vomiting when she followed her diet instructions.  She reports her last bowel movement was approximately 6 days ago but she has been passing gas.    Both patient and her niece advised that she has seen Heme-Onc and Psychiatry.  Given her diagnosis of stage IV cancer she has decided not to undergo treatment for her cancer.  At the time of her visit with Oncology she was very depressed however she states that since starting on a medication prescribed by psychiatry as well as her visit with Psychiatry her depression has improved.      ROS: As per HPI and below:  REVIEW OF SYSTEMS:  GENERAL: No chills.  No fever.  No malaise.  SKIN: No lesions.  Denies rash.  HEENT:  Has had a runny nose.  Feels as though she can't clear her throat.  No headache.  CHEST:  Has had a cough.  No shortness of breath.  CARDIOVASCULAR: No chest pain.  No reduced exercise tolerance.  ABDOMEN:  See HPI.  URINARY:  Complains of some urinary hesitancy but no dysuria.  HEMATOLOGIC: No anemia.  Denies excessive  bleeding.  PERIPHERAL VASCULAR: No edema.  No cramps.  MUSCULOSKELETAL: No backache.  Denies joint pain.  Denies joint stiffness.  NEUROLOGIC: No paresthesias.  No numbness.  No focal weakness.  Denies syncope.    Review of patient's allergies indicates:  No Known Allergies    No current facility-administered medications on file prior to encounter.      Current Outpatient Medications on File Prior to Encounter   Medication Sig Dispense Refill    amLODIPine (NORVASC) 5 MG tablet Take 1 tablet (5 mg total) by mouth once daily. 30 tablet 11    aspirin-acetaminophen-caffeine 250-250-65 mg (EXCEDRIN EXTRA STRENGTH) 250-250-65 mg per tablet Take 2 tablets by mouth daily as needed for Pain.      calcium carbonate (TUMS) 200 mg calcium (500 mg) chewable tablet Take 1 tablet (500 mg total) by mouth daily as needed.      famotidine (PEPCID) 20 MG tablet Take 1 tablet (20 mg total) by mouth daily as needed (heart burn). 21 tablet 3    HYDROcodone-acetaminophen (NORCO)  mg per tablet hydrocodone 10 mg-acetaminophen 325 mg tablet   TAKE 1 TABLET BY MOUTH 4 TIMES DAILY AS NEEDED FOR PAIN.      HYDROcodone-acetaminophen (NORCO) 5-325 mg per tablet Take 1 tablet by mouth every 6 (six) hours as needed. 28 tablet 0    lisinopril-hydrochlorothiazide (PRINZIDE,ZESTORETIC) 20-12.5 mg per tablet Take 1 tablet by mouth once daily.      mirtazapine (REMERON) 15 MG tablet Take 1 tablet (15 mg total) by mouth nightly. 30 tablet 2    ondansetron (ZOFRAN) 8 MG tablet Take 8 mg by mouth every 8 (eight) hours.      pantoprazole (PROTONIX) 40 MG tablet Take 40 mg by mouth once daily.      promethazine (PHENERGAN) 25 MG suppository Place 25 mg rectally every 6 (six) hours as needed for Nausea.      sucralfate (CARAFATE) 1 gram tablet Take 1 g by mouth 4 (four) times daily.      zolpidem (AMBIEN) 10 mg Tab Take 5 mg by mouth nightly as needed.         PMH:  As per HPI and below:  Past Medical History:   Diagnosis Date     Hypertension     Pancreatic cancer      Past Medical History Pertinent Negatives:   Diagnosis Date Noted    History of psychiatric hospitalization 02/05/2020    Hx of psychiatric care 02/05/2020    Alejandra 02/05/2020    Psychiatric exam requested by authority 02/05/2020    Psychiatric problem 02/05/2020    Suicide attempt 02/05/2020    Therapy 02/05/2020     Past Surgical History:   Procedure Laterality Date    APPENDECTOMY      ENDOSCOPIC ULTRASOUND OF UPPER GASTROINTESTINAL TRACT N/A 1/20/2020    Procedure: ULTRASOUND, UPPER GI TRACT, ENDOSCOPIC;  Surgeon: Toni Messer MD;  Location: [facility] ([address] [place] [address]);  Service: Endoscopy;  Laterality: N/A;    ESOPHAGOGASTRODUODENOSCOPY N/A 1/22/2020    Procedure: EGD (ESOPHAGOGASTRODUODENOSCOPY);  Surgeon: Cory Servin MD;  Location: [facility] ([address] [place] [address]);  Service: Endoscopy;  Laterality: N/A;    intestinal stent         Social History     Socioeconomic History    Marital status:      Spouse name: Not on file    Number of children: 3    Years of education: Not on file    Highest education level: Not on file   Occupational History    Not on file   Social Needs    Financial resource strain: Not on file    Food insecurity:     Worry: Not on file     Inability: Not on file    Transportation needs:     Medical: Not on file     Non-medical: Not on file   Tobacco Use    Smoking status: Never Smoker    Smokeless tobacco: Never Used   Substance and Sexual Activity    Alcohol use: Never     Frequency: Never    Drug use: Never    Sexual activity: Not Currently   Lifestyle    Physical activity:     Days per week: Not on file     Minutes per session: Not on file    Stress: Not on file   Relationships    Social connections:     Talks on phone: Not on file     Gets together: Not on file     Attends Taoism service: Not on file     Active member of club or organization: Not on file     Attends meetings of clubs or organizations: Not on file      Relationship status: Not on file   Other Topics Concern    Patient feels they ought to cut down on drinking/drug use Not Asked    Patient annoyed by others criticizing their drinking/drug use Not Asked    Patient has felt bad or guilty about drinking/drug use Not Asked    Patient has had a drink/used drugs as an eye opener in the AM Not Asked   Social History Narrative     ( in nursing home with dementia since 12/2019)    3 adult children (Talita- lives in [facility], [facility]); 6 grandchildren       Family History   Problem Relation Age of Onset    Colon cancer Mother     Breast cancer Mother     Prostate cancer Brother     Pancreatic cancer Maternal Uncle     Depression Daughter        Physical Exam:    Vitals:    02/09/20 1524   BP:    Pulse: 88   Resp:    Temp: 98 °F (36.7 °C)     APPEARANCE: Alert and oriented, and in mild distress. Patient has intermittent episodes of the pain during my exam.  SKIN: Warm and dry. No obvious rashes or lesions noted.  HEENT: PERRL.  EOMI.  Airway intact.  No tonsillar enlargement. No pharyngeal erythema or exudate.  Uvula midline with no soft palate swelling.  NECK: No JVD.  Supple. Trachea midline.  CHEST: Lungs clear to auscultation; no rales, rhonchi, or wheezing noted. Breath sounds equal bilaterally. Chest wall is nontender.  CARDIOVASCULAR: Normal S1, S2. No murmurs, gallops, or rubs. Rhythm regular with no ectopy noted.  ABDOMEN: Soft.  Bowel sounds normal. There is mild left upper quadrant tenderness. No rashes in this area.  No masses.    PERIPHERAL VASCULAR: Radial pulses 2+ bilaterally.  Dorsalis pedis 2+ bilaterally.  No edema.  No calf tenderness.  Extremities warm.  MUSCULOSKELETAL:  No joint tenderness or swelling.  No soft tissue swelling or tenderness.  No limited range of motion.  BACK:  No flank or low back tenderness, normal curvature.  NEUROLOGIC: CN II-XII grossly intact.   Sensory: Intact to light touch  distally.  Motor: 5/5 strength major flexors/extensors.  MENTAL STATUS: Patient alert, oriented x 3, conversant.    Labs Reviewed - No data to display    Medications - No data to display    MDM:    64 y.o. female with left upper quadrant tenderness that has been intermittent since last night.  She recently had a duodenal stent placed.  Differential diagnosis includes but is not limited to migration of duodenal stent, recurrent bowel obstruction, musculoskeletal pain, left lower lobe pneumonia.  Will check chest x-ray, abdominal x-ray, blood work.  Will treat her pain.     ED COURSE AND NOTES:    CXR (Independently reviewed):  Chest x-ray shows right-sided small pleural effusion.  Left lung is clear.  Abdominal film shows a stent near the pylorus with a distended stomach.    Medical Record Reviewed:  I have reviewed this patient's electronic medical record.  Patient was just seen in Heme-Onc clinic on February 4th.  She was recently diagnosed with pancreatic cancer when she presented with a pancreatic head mass on January 18th.  CT scans were concerning for possible metastatic disease.  Pathology positive for adenocarcinoma.  Patient was admitted to the hospital January 18th through the 26th.  At that time she presented with a small-bowel obstruction and a duodenal stent was placed.      4:34 PM  I have discussed the x-ray findings with Radiology.  We are going to proceed with a CT scan of the abdomen pelvis with 1 dose of oral contrast to further evaluate for possible stent migration.      7:56 PM  I discussed the CT scan with GI on call.  This showed no malposition of stent however there appears to be slow flow thru the stent with some gaseous distension of the stomach.  GI recommended miralax daily and advised that her pain could be cancer related pain.  I recommended she continue her pain meds prn and miralx daily to prevent constipation.  Pt also has a UTI.  Given macrobid for that.  BP was elevated in the ED>   I feel this was related to discomfort and nausea.  Improved when these symptoms were treated.  I recommended she follow up with oncology.  Pt and niece advised they are planning to set up hospice.    Diagnoses:    1. Abdominal pain.  2.  Pancreatic CA.  3. UTI.             Namita Santiago MD  02/10/20 0970

## 2020-02-09 NOTE — ED TRIAGE NOTES
Pt had stent placed, stage 4 pancreatic cancer, not taking chemo. Pt reports abd pain when coughing. Pt states she has not had bowel movement since Sunday.

## 2020-02-10 LAB
BACTERIA UR CULT: NORMAL
BACTERIA UR CULT: NORMAL

## 2020-02-10 NOTE — DISCHARGE INSTRUCTIONS
Our goal in the emergency department is to always give you outstanding care and exceptional service. You may receive a survey by mail or e-mail in the next week regarding your experience in our ED. We would greatly appreciate your completing and returning the survey. Your feedback provides us with a way to recognize our staff who give very good care and it helps us learn how to improve when your experience was below our aspiration of excellence.     Begin taking your MiraLax daily.  You may take your pain medication as needed and your nausea medication as needed.

## 2020-02-11 ENCOUNTER — TELEPHONE (OUTPATIENT)
Dept: HEMATOLOGY/ONCOLOGY | Facility: CLINIC | Age: 65
End: 2020-02-11

## 2020-02-11 NOTE — TELEPHONE ENCOUNTER
Spoke with daughter. She states patient has decided against treatment. Appointment with dr esteban canceled for today.  Daughter thanked nurse.   She understands if her mom changes her mind, dr esteban is OK seeing her again.

## 2020-02-28 ENCOUNTER — HOSPITAL ENCOUNTER (EMERGENCY)
Facility: HOSPITAL | Age: 65
Discharge: ANOTHER HOSPITAL | End: 2020-02-28
Attending: SURGERY

## 2020-02-28 ENCOUNTER — NURSE TRIAGE (OUTPATIENT)
Dept: ADMINISTRATIVE | Facility: CLINIC | Age: 65
End: 2020-02-28

## 2020-02-28 ENCOUNTER — HOSPITAL ENCOUNTER (INPATIENT)
Facility: HOSPITAL | Age: 65
LOS: 4 days | Discharge: HOME | DRG: 374 | End: 2020-03-03
Attending: SURGERY | Admitting: SURGERY
Payer: MEDICARE

## 2020-02-28 VITALS
HEART RATE: 97 BPM | SYSTOLIC BLOOD PRESSURE: 147 MMHG | DIASTOLIC BLOOD PRESSURE: 77 MMHG | OXYGEN SATURATION: 100 % | BODY MASS INDEX: 21.51 KG/M2 | TEMPERATURE: 96 F | WEIGHT: 154.19 LBS | RESPIRATION RATE: 18 BRPM

## 2020-02-28 DIAGNOSIS — Z01.89 ENCOUNTER FOR IMAGING STUDY TO CONFIRM NASOGASTRIC (NG) TUBE PLACEMENT: ICD-10-CM

## 2020-02-28 DIAGNOSIS — E87.6 HYPOKALEMIA: ICD-10-CM

## 2020-02-28 DIAGNOSIS — K86.89 PANCREATIC MASS: ICD-10-CM

## 2020-02-28 DIAGNOSIS — K56.609 SBO (SMALL BOWEL OBSTRUCTION): Primary | ICD-10-CM

## 2020-02-28 DIAGNOSIS — K56.609 SMALL BOWEL OBSTRUCTION: ICD-10-CM

## 2020-02-28 DIAGNOSIS — C25.9 PANCREATIC ADENOCARCINOMA: Primary | ICD-10-CM

## 2020-02-28 LAB
ALBUMIN SERPL BCP-MCNC: 2.5 G/DL (ref 3.5–5.2)
ALP SERPL-CCNC: 303 U/L (ref 55–135)
ALT SERPL W/O P-5'-P-CCNC: 29 U/L (ref 10–44)
ANION GAP SERPL CALC-SCNC: 13 MMOL/L (ref 8–16)
AST SERPL-CCNC: 44 U/L (ref 10–40)
BACTERIA #/AREA URNS HPF: NORMAL /HPF
BASOPHILS # BLD AUTO: 0.06 K/UL (ref 0–0.2)
BASOPHILS NFR BLD: 0.5 % (ref 0–1.9)
BILIRUB SERPL-MCNC: 0.8 MG/DL (ref 0.1–1)
BILIRUB UR QL STRIP: ABNORMAL
BUN SERPL-MCNC: 12 MG/DL (ref 8–23)
CALCIUM SERPL-MCNC: 9 MG/DL (ref 8.7–10.5)
CHLORIDE SERPL-SCNC: 97 MMOL/L (ref 95–110)
CLARITY UR: CLEAR
CO2 SERPL-SCNC: 26 MMOL/L (ref 23–29)
COLOR UR: YELLOW
CREAT SERPL-MCNC: 0.7 MG/DL (ref 0.5–1.4)
DIFFERENTIAL METHOD: ABNORMAL
EOSINOPHIL # BLD AUTO: 0.1 K/UL (ref 0–0.5)
EOSINOPHIL NFR BLD: 0.4 % (ref 0–8)
ERYTHROCYTE [DISTWIDTH] IN BLOOD BY AUTOMATED COUNT: 16.7 % (ref 11.5–14.5)
EST. GFR  (AFRICAN AMERICAN): >60 ML/MIN/1.73 M^2
EST. GFR  (NON AFRICAN AMERICAN): >60 ML/MIN/1.73 M^2
GLUCOSE SERPL-MCNC: 102 MG/DL (ref 70–110)
GLUCOSE UR QL STRIP: NEGATIVE
HCT VFR BLD AUTO: 33.5 % (ref 37–48.5)
HGB BLD-MCNC: 11.3 G/DL (ref 12–16)
HGB UR QL STRIP: NEGATIVE
IMM GRANULOCYTES # BLD AUTO: 0.05 K/UL (ref 0–0.04)
IMM GRANULOCYTES NFR BLD AUTO: 0.4 % (ref 0–0.5)
KETONES UR QL STRIP: ABNORMAL
LEUKOCYTE ESTERASE UR QL STRIP: ABNORMAL
LIPASE SERPL-CCNC: 5 U/L (ref 4–60)
LYMPHOCYTES # BLD AUTO: 1.4 K/UL (ref 1–4.8)
LYMPHOCYTES NFR BLD: 11.9 % (ref 18–48)
MCH RBC QN AUTO: 32.1 PG (ref 27–31)
MCHC RBC AUTO-ENTMCNC: 33.7 G/DL (ref 32–36)
MCV RBC AUTO: 95 FL (ref 82–98)
MICROSCOPIC COMMENT: NORMAL
MONOCYTES # BLD AUTO: 0.9 K/UL (ref 0.3–1)
MONOCYTES NFR BLD: 7.8 % (ref 4–15)
NEUTROPHILS # BLD AUTO: 9.2 K/UL (ref 1.8–7.7)
NEUTROPHILS NFR BLD: 79 % (ref 38–73)
NITRITE UR QL STRIP: NEGATIVE
NRBC BLD-RTO: 0 /100 WBC
PH UR STRIP: 7 [PH] (ref 5–8)
PLATELET # BLD AUTO: 497 K/UL (ref 150–350)
PMV BLD AUTO: 10.1 FL (ref 9.2–12.9)
POTASSIUM SERPL-SCNC: 3.4 MMOL/L (ref 3.5–5.1)
PROT SERPL-MCNC: 6.6 G/DL (ref 6–8.4)
PROT UR QL STRIP: ABNORMAL
RBC # BLD AUTO: 3.52 M/UL (ref 4–5.4)
SODIUM SERPL-SCNC: 136 MMOL/L (ref 136–145)
SP GR UR STRIP: 1.02 (ref 1–1.03)
URN SPEC COLLECT METH UR: ABNORMAL
UROBILINOGEN UR STRIP-ACNC: 1 EU/DL
WBC # BLD AUTO: 11.63 K/UL (ref 3.9–12.7)
WBC #/AREA URNS HPF: 2 /HPF (ref 0–5)

## 2020-02-28 PROCEDURE — 25000003 PHARM REV CODE 250: Performed by: SURGERY

## 2020-02-28 PROCEDURE — 11000001 HC ACUTE MED/SURG PRIVATE ROOM

## 2020-02-28 PROCEDURE — 99285 EMERGENCY DEPT VISIT HI MDM: CPT | Mod: 25

## 2020-02-28 PROCEDURE — 96376 TX/PRO/DX INJ SAME DRUG ADON: CPT

## 2020-02-28 PROCEDURE — 63600175 PHARM REV CODE 636 W HCPCS: Performed by: SURGERY

## 2020-02-28 PROCEDURE — 25500020 PHARM REV CODE 255: Performed by: SURGERY

## 2020-02-28 PROCEDURE — 96361 HYDRATE IV INFUSION ADD-ON: CPT

## 2020-02-28 PROCEDURE — 80053 COMPREHEN METABOLIC PANEL: CPT

## 2020-02-28 PROCEDURE — 96365 THER/PROPH/DIAG IV INF INIT: CPT

## 2020-02-28 PROCEDURE — 85025 COMPLETE CBC W/AUTO DIFF WBC: CPT

## 2020-02-28 PROCEDURE — 63600175 PHARM REV CODE 636 W HCPCS: Performed by: NURSE PRACTITIONER

## 2020-02-28 PROCEDURE — 81000 URINALYSIS NONAUTO W/SCOPE: CPT

## 2020-02-28 PROCEDURE — 83690 ASSAY OF LIPASE: CPT

## 2020-02-28 PROCEDURE — 96375 TX/PRO/DX INJ NEW DRUG ADDON: CPT

## 2020-02-28 RX ORDER — SODIUM CHLORIDE 0.9 % (FLUSH) 0.9 %
3 SYRINGE (ML) INJECTION
Status: DISCONTINUED | OUTPATIENT
Start: 2020-02-28 | End: 2020-02-28

## 2020-02-28 RX ORDER — HYDROMORPHONE HYDROCHLORIDE 2 MG/ML
0.2 INJECTION, SOLUTION INTRAMUSCULAR; INTRAVENOUS; SUBCUTANEOUS EVERY 5 MIN PRN
Status: CANCELLED | OUTPATIENT
Start: 2020-02-28

## 2020-02-28 RX ORDER — ONDANSETRON 2 MG/ML
4 INJECTION INTRAMUSCULAR; INTRAVENOUS
Status: COMPLETED | OUTPATIENT
Start: 2020-02-28 | End: 2020-02-28

## 2020-02-28 RX ORDER — METOCLOPRAMIDE HYDROCHLORIDE 5 MG/ML
10 INJECTION INTRAMUSCULAR; INTRAVENOUS EVERY 10 MIN PRN
Status: DISCONTINUED | OUTPATIENT
Start: 2020-02-28 | End: 2020-02-28

## 2020-02-28 RX ORDER — SODIUM CHLORIDE 9 MG/ML
1000 INJECTION, SOLUTION INTRAVENOUS
Status: COMPLETED | OUTPATIENT
Start: 2020-02-28 | End: 2020-02-28

## 2020-02-28 RX ORDER — MORPHINE SULFATE 2 MG/ML
2 INJECTION, SOLUTION INTRAMUSCULAR; INTRAVENOUS
Status: COMPLETED | OUTPATIENT
Start: 2020-02-28 | End: 2020-02-28

## 2020-02-28 RX ORDER — METOCLOPRAMIDE HYDROCHLORIDE 5 MG/ML
10 INJECTION INTRAMUSCULAR; INTRAVENOUS EVERY 10 MIN PRN
Status: CANCELLED | OUTPATIENT
Start: 2020-02-28

## 2020-02-28 RX ORDER — SODIUM CHLORIDE, SODIUM LACTATE, POTASSIUM CHLORIDE, CALCIUM CHLORIDE 600; 310; 30; 20 MG/100ML; MG/100ML; MG/100ML; MG/100ML
INJECTION, SOLUTION INTRAVENOUS CONTINUOUS
Status: DISCONTINUED | OUTPATIENT
Start: 2020-02-28 | End: 2020-03-01

## 2020-02-28 RX ORDER — SODIUM CHLORIDE 9 MG/ML
INJECTION, SOLUTION INTRAVENOUS CONTINUOUS
Status: DISCONTINUED | OUTPATIENT
Start: 2020-02-28 | End: 2020-02-28

## 2020-02-28 RX ORDER — LIDOCAINE HYDROCHLORIDE 20 MG/ML
5 SOLUTION OROPHARYNGEAL
Status: COMPLETED | OUTPATIENT
Start: 2020-02-28 | End: 2020-02-28

## 2020-02-28 RX ORDER — SODIUM CHLORIDE 9 MG/ML
INJECTION, SOLUTION INTRAVENOUS CONTINUOUS
Status: CANCELLED | OUTPATIENT
Start: 2020-02-28

## 2020-02-28 RX ORDER — SODIUM CHLORIDE 9 MG/ML
1000 INJECTION, SOLUTION INTRAVENOUS
Status: DISCONTINUED | OUTPATIENT
Start: 2020-02-28 | End: 2020-02-28 | Stop reason: HOSPADM

## 2020-02-28 RX ORDER — ONDANSETRON 2 MG/ML
8 INJECTION INTRAMUSCULAR; INTRAVENOUS EVERY 6 HOURS PRN
Status: DISCONTINUED | OUTPATIENT
Start: 2020-02-28 | End: 2020-03-03 | Stop reason: HOSPADM

## 2020-02-28 RX ORDER — HYDROMORPHONE HYDROCHLORIDE 2 MG/ML
0.2 INJECTION, SOLUTION INTRAMUSCULAR; INTRAVENOUS; SUBCUTANEOUS EVERY 5 MIN PRN
Status: DISCONTINUED | OUTPATIENT
Start: 2020-02-28 | End: 2020-02-28

## 2020-02-28 RX ORDER — SODIUM CHLORIDE 0.9 % (FLUSH) 0.9 %
3 SYRINGE (ML) INJECTION
Status: CANCELLED | OUTPATIENT
Start: 2020-02-28

## 2020-02-28 RX ORDER — HYDROMORPHONE HYDROCHLORIDE 1 MG/ML
0.5 INJECTION, SOLUTION INTRAMUSCULAR; INTRAVENOUS; SUBCUTANEOUS
Status: COMPLETED | OUTPATIENT
Start: 2020-02-28 | End: 2020-02-28

## 2020-02-28 RX ADMIN — IOHEXOL 75 ML: 350 INJECTION, SOLUTION INTRAVENOUS at 06:02

## 2020-02-28 RX ADMIN — ONDANSETRON 4 MG: 2 INJECTION INTRAMUSCULAR; INTRAVENOUS at 09:02

## 2020-02-28 RX ADMIN — PROMETHAZINE HYDROCHLORIDE 12.5 MG: 25 INJECTION INTRAMUSCULAR; INTRAVENOUS at 05:02

## 2020-02-28 RX ADMIN — ONDANSETRON 4 MG: 2 INJECTION INTRAMUSCULAR; INTRAVENOUS at 04:02

## 2020-02-28 RX ADMIN — SODIUM CHLORIDE 1000 ML: 0.9 INJECTION, SOLUTION INTRAVENOUS at 04:02

## 2020-02-28 RX ADMIN — IOHEXOL 50 ML: 350 INJECTION, SOLUTION INTRAVENOUS at 06:02

## 2020-02-28 RX ADMIN — SODIUM CHLORIDE 1000 ML: 0.9 INJECTION, SOLUTION INTRAVENOUS at 08:02

## 2020-02-28 RX ADMIN — HYDROMORPHONE HYDROCHLORIDE 0.5 MG: 1 INJECTION, SOLUTION INTRAMUSCULAR; INTRAVENOUS; SUBCUTANEOUS at 09:02

## 2020-02-28 RX ADMIN — LIDOCAINE HYDROCHLORIDE 5 ML: 20 SOLUTION ORAL; TOPICAL at 08:02

## 2020-02-28 RX ADMIN — MORPHINE SULFATE 2 MG: 2 INJECTION, SOLUTION INTRAMUSCULAR; INTRAVENOUS at 05:02

## 2020-02-28 NOTE — TELEPHONE ENCOUNTER
"  Reason for Disposition   [1] MODERATE vomiting (e.g., 3 - 5 times/day) AND [2] age > 60    Additional Information   Negative: Shock suspected (e.g., cold/pale/clammy skin, too weak to stand, low BP, rapid pulse)   Negative: Difficult to awaken or acting confused (e.g., disoriented, slurred speech)   Negative: Sounds like a life-threatening emergency to the triager   Negative: Chest pain   Negative: Vomiting occurs only while coughing   Negative: Constipation is main symptom   Negative: Diarrhea is main symptom   Negative: [1] Vomiting AND [2] contains red blood or black ("coffee ground") material  (Exception: few red streaks in vomit that only happened once)   Negative: [1] Vomiting AND [2] recent abdominal injury (within last 3 days)   Negative: [1] Vomiting AND [2] recent head injury (within last 3 days)   Negative: [1] Vomiting AND [2] insulin-dependent diabetes (Type I) AND [3] glucose > 400 mg/dl (22 mmol/l)   Negative: [1] Neutropenia known or suspected (e.g., recent cancer chemotherapy) AND [2] fever > 100.4 F (38.0 C)   Negative: [1] Neutropenia known or suspected (e.g., recent cancer chemotherapy) AND [2] vomiting   Negative: SEVERE vomiting (e.g., 6 or more times / day)    Protocols used: CANCER - NAUSEA AND VOMITING-A-AH  Pt called re feeling nausea past couple days, cant eat but drinks some then comes up later, stomach bloated. Some pain. had stent in intestines. Hx panc CA. No flatus. Had BM tues- thurs. vomiting x 2 in 24 hours. Pt states that she will go to [facility] ED if she can get a ride. Rec EMS if no ride. Call back with questions   "

## 2020-02-28 NOTE — ED PROVIDER NOTES
Encounter Date: 2/28/2020       History     Chief Complaint   Patient presents with    Emesis     Radha Alcazar is a 64 y.o. Female with PMH of hypertension, pancreatic cancer who presents the ED with reports of nausea, vomiting x 3 days.  Patient reports continuous nausea with multiple episodes of vomiting not relieved by p.o. Zofran.  Patient reports that she is continuously nauseated, but usually gets good relief with Zofran.  She reports that for the past 3 days Zofran has not been relieving her symptoms.  She denies abdominal pain, but reports abdominal bloating.  She reports history of constipation, but reports that she has been having daily bowel movements for the past week after starting MiraLax.  Patient reports Stage IV pancreatic cancer and has declined further treatment.     The history is provided by the patient.     Review of patient's allergies indicates:  No Known Allergies  Past Medical History:   Diagnosis Date    Hypertension     Pancreatic cancer      Past Surgical History:   Procedure Laterality Date    APPENDECTOMY      ENDOSCOPIC ULTRASOUND OF UPPER GASTROINTESTINAL TRACT N/A 1/20/2020    Procedure: ULTRASOUND, UPPER GI TRACT, ENDOSCOPIC;  Surgeon: Toni Messer MD;  Location: [facility] ([address] [place] [address]);  Service: Endoscopy;  Laterality: N/A;    ESOPHAGOGASTRODUODENOSCOPY N/A 1/22/2020    Procedure: EGD (ESOPHAGOGASTRODUODENOSCOPY);  Surgeon: Cory Servin MD;  Location: [facility] ([address] [place] [address]);  Service: Endoscopy;  Laterality: N/A;    intestinal stent       Family History   Problem Relation Age of Onset    Colon cancer Mother     Breast cancer Mother     Prostate cancer Brother     Pancreatic cancer Maternal Uncle     Depression Daughter      Social History     Tobacco Use    Smoking status: Never Smoker    Smokeless tobacco: Never Used   Substance Use Topics    Alcohol use: Never     Frequency: Never    Drug use: Never     Review of Systems   Constitutional: Negative.    HENT:  Negative.    Eyes: Negative.    Respiratory: Negative.    Cardiovascular: Negative.    Gastrointestinal: Positive for abdominal distention, abdominal pain, nausea and vomiting. Negative for diarrhea.   Genitourinary: Negative.    Musculoskeletal: Negative.    Skin: Negative.    Neurological: Negative.    Psychiatric/Behavioral: Negative.      Physical Exam     Initial Vitals [02/28/20 1630]   BP Pulse Resp Temp SpO2   139/68 (!) 116 20 96.5 °F (35.8 °C) --      MAP       --         Physical Exam    Constitutional: She appears well-developed.   HENT:   Head: Normocephalic.   Right Ear: External ear normal.   Left Ear: External ear normal.   Nose: Nose normal.   Mouth/Throat: Oropharynx is clear and moist.   Eyes: EOM are normal. Pupils are equal, round, and reactive to light.   Neck: Normal range of motion.   Cardiovascular: Normal rate, regular rhythm and normal heart sounds.   Pulmonary/Chest: Breath sounds normal.   Abdominal: Soft.   Hyperactive bowel sounds in the periumbilical area, periumbilical abdominal pain   Neurological: She is alert and oriented to person, place, and time.   Skin: Skin is warm.       ED Course   Procedures  Labs Reviewed   CBC W/ AUTO DIFFERENTIAL - Abnormal; Notable for the following components:       Result Value    RBC 3.52 (*)     Hemoglobin 11.3 (*)     Hematocrit 33.5 (*)     Mean Corpuscular Hemoglobin 32.1 (*)     RDW 16.7 (*)     Platelets 497 (*)     Gran # (ANC) 9.2 (*)     Immature Grans (Abs) 0.05 (*)     Gran% 79.0 (*)     Lymph% 11.9 (*)     All other components within normal limits   COMPREHENSIVE METABOLIC PANEL - Abnormal; Notable for the following components:    Potassium 3.4 (*)     Albumin 2.5 (*)     Alkaline Phosphatase 303 (*)     AST 44 (*)     All other components within normal limits   URINALYSIS, REFLEX TO URINE CULTURE - Abnormal; Notable for the following components:    Protein, UA Trace (*)     Ketones, UA 2+ (*)     Bilirubin (UA) 2+ (*)     Leukocytes, UA  Trace (*)     All other components within normal limits    Narrative:     Preferred Collection Type->Urine, Clean Catch   LIPASE   URINALYSIS MICROSCOPIC    Narrative:     Preferred Collection Type->Urine, Clean Catch          Imaging Results          CT Abdomen Pelvis With Contrast (Final result)  Result time 02/28/20 19:24:34    Final result by Rodolfo Norman III, MD (02/28/20 19:24:34)                 Impression:      Infiltrative pancreatic body and tail mass compatible with patient's history of pancreatic malignancy.  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  Extensive peritoneal carcinomatosis is again noted with serosal metastatic implants and associated luminal narrowing of the proximal small bowel.  Possible low-grade partial proximal small bowel obstruction without evidence of complete obstruction.  Persistent liver metastases and adjacent subcapsular metastatic implants.  Persistent focal left intrahepatic biliary ductal dilation.    Enlarging right pleural effusion with adjacent compressive atelectasis.      Electronically signed by: Rodolfo Norman MD  Date:    02/28/2020  Time:    19:24             Narrative:    EXAMINATION:  CT ABDOMEN PELVIS WITH CONTRAST    CLINICAL HISTORY:  Nausea, vomiting, diarrhea;Bowel obstruction, high-grade; history of pancreatic cancer    TECHNIQUE:  Routine contrast enhanced abdominal and pelvic CT performed. Coronal and sagittal images obtained. All CT scans at this facility are performed  using dose modulation techniques as appropriate to performed exam including the following:  automated exposure control; adjustment of mA and/or kV according to the patients size (this includes techniques or standardized protocols for targeted exams where dose is matched to indication/reason for exam: i.e. extremities or head);  iterative reconstruction technique.    COMPARISON:  02/09/2020    FINDINGS:  There has been  interval enlargement of a moderate volume right pleural effusion with worsening adjacent compressive atelectasis in the right lung base.  There is a trace left pleural effusion.  No acute osseous abnormality or suspicious bone marrow lesion identified.    Low-density mass in the pancreatic body and tail is again noted with extension into the lesser sac and gastrohepatic ligament compatible with patient's history of pancreatic carcinoma, without significant interval change.  There are rim enhancing low-density cystic-appearing lesions along mesentery, peritoneum and perihepatic region compatible with metastases and peritoneal carcinomatosis.  There is free fluid, peritoneal thickening in hyperenhancement in the pelvis also consistent with peritoneal carcinomatosis.  There is a serosal metastatic implant of the proximal jejunum in the lower abdomen near midline and just distal to the jejunal stent with significant luminal narrowing and possible partial obstruction.  There is heterogeneous contrast and food material within the stent.  There is mild focal distention of the distal descending duodenal segment.  The descending duodenal segment is collapsed possibly due to mass effect from adjacent metastatic lesions.  There is stable mild gastric distention with retained oral contrast.  Oral contrast is seen extending distal to the proximal jejunal stricture without evidence of complete obstruction.  Serosal metastatic implants are also seen adjacent to the hepatic flexure of colon and sigmoid colon.  No evidence of large bowel obstruction.  No obvious superimposed acute bowel inflammatory changes identified.  Multiple low-density metastatic lesions scattered in the liver are again noted.  Subcapsular metastatic implants are again noted.  There is persistent mild gallbladder distention and gallbladder wall thickening.  There is persistent focal left intrahepatic biliary ductal dilation.  No significant extrahepatic biliary  ductal dilation identified.  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  There is a high-grade stricture of the proximal SMV due to tumor encasement.    The adrenals, spleen, kidneys, ureters and bladder are within normal limits.                                                      Critical Care ED Physician Time (minutes):  -- Performed by: Dany Jackson M.D.  -- Date/Time: 7:43 PM 2/28/2020   -- Direct Patient Care (Face Time): 5  -- Additional History from Records or Taking Additional History: 5  -- Ordering, Reviewing, and Interpreting Diagnostic Studies: 5  -- Total Time in Documentation: 5  -- Consultation with Other Physicians: 5  -- Consultation with Family Related to Condition: 5  -- Total Critical Care Time: 30            Clinical Impression:       ICD-10-CM ICD-9-CM   1. SBO (small bowel obstruction) K56.609 560.9             ED Disposition Condition    Transfer to Another Facility Stable             This Patient Was Turned Over To Me From The Nurse Practitioner     -- I took over this note from the nurse practitioner this shift  -- His/her documentation and exam is above this line, my documentation is below  -- this patient presents with nausea vomiting abdominal pain  -- patient is currently not undergoing any treatment for stage IV pancreatic cancer  -- patient had a duodenal stent placed at [facility], continued pain now  -- CT scan shows a partial small-bowel obstruction and, NG tube placed  -- patient be transferred to [facility] for further evaluation               Dany Jackson MD  02/28/20 1944

## 2020-02-28 NOTE — ED NOTES
Iv started blood drawn patient educated about orders. zofran given. Patient stated Zofran may not work

## 2020-02-28 NOTE — ED TRIAGE NOTES
64 y.o. female presents to ER   Chief Complaint   Patient presents with    Emesis   family member reports recently dx with pancreatic cancer, had surgery about 1 month ago & started with nausea & vomiting x's 3 days.  No acute distress noted.

## 2020-02-29 PROCEDURE — 99223 1ST HOSP IP/OBS HIGH 75: CPT | Mod: ,,, | Performed by: SURGERY

## 2020-02-29 PROCEDURE — C9113 INJ PANTOPRAZOLE SODIUM, VIA: HCPCS | Performed by: SURGERY

## 2020-02-29 PROCEDURE — 99223 PR INITIAL HOSPITAL CARE,LEVL III: ICD-10-PCS | Mod: ,,, | Performed by: SURGERY

## 2020-02-29 PROCEDURE — 11000001 HC ACUTE MED/SURG PRIVATE ROOM

## 2020-02-29 PROCEDURE — 25000003 PHARM REV CODE 250: Performed by: SURGERY

## 2020-02-29 PROCEDURE — 63600175 PHARM REV CODE 636 W HCPCS: Performed by: SURGERY

## 2020-02-29 RX ORDER — HYDROCHLOROTHIAZIDE 12.5 MG/1
12.5 TABLET ORAL DAILY
Status: DISCONTINUED | OUTPATIENT
Start: 2020-02-29 | End: 2020-03-03 | Stop reason: HOSPADM

## 2020-02-29 RX ORDER — HYDROCODONE BITARTRATE AND ACETAMINOPHEN 10; 325 MG/1; MG/1
1 TABLET ORAL EVERY 4 HOURS PRN
Status: DISCONTINUED | OUTPATIENT
Start: 2020-02-29 | End: 2020-03-03 | Stop reason: HOSPADM

## 2020-02-29 RX ORDER — PROMETHAZINE HYDROCHLORIDE 6.25 MG/5ML
25 SYRUP ORAL EVERY 6 HOURS PRN
Status: DISCONTINUED | OUTPATIENT
Start: 2020-02-29 | End: 2020-03-03 | Stop reason: HOSPADM

## 2020-02-29 RX ORDER — MIRTAZAPINE 15 MG/1
15 TABLET, ORALLY DISINTEGRATING ORAL NIGHTLY
Status: DISCONTINUED | OUTPATIENT
Start: 2020-02-29 | End: 2020-02-29

## 2020-02-29 RX ORDER — AMLODIPINE BESYLATE 5 MG/1
5 TABLET ORAL DAILY
Status: DISCONTINUED | OUTPATIENT
Start: 2020-02-29 | End: 2020-03-03 | Stop reason: HOSPADM

## 2020-02-29 RX ORDER — ENOXAPARIN SODIUM 100 MG/ML
40 INJECTION SUBCUTANEOUS EVERY 24 HOURS
Status: DISCONTINUED | OUTPATIENT
Start: 2020-02-29 | End: 2020-03-03 | Stop reason: HOSPADM

## 2020-02-29 RX ORDER — LISINOPRIL 20 MG/1
20 TABLET ORAL DAILY
Status: DISCONTINUED | OUTPATIENT
Start: 2020-02-29 | End: 2020-03-03 | Stop reason: HOSPADM

## 2020-02-29 RX ORDER — SODIUM CHLORIDE 0.9 % (FLUSH) 0.9 %
10 SYRINGE (ML) INJECTION
Status: DISCONTINUED | OUTPATIENT
Start: 2020-02-29 | End: 2020-03-03 | Stop reason: HOSPADM

## 2020-02-29 RX ORDER — MIRTAZAPINE 15 MG/1
15 TABLET, FILM COATED ORAL NIGHTLY
Status: DISCONTINUED | OUTPATIENT
Start: 2020-02-29 | End: 2020-03-03 | Stop reason: HOSPADM

## 2020-02-29 RX ORDER — PANTOPRAZOLE SODIUM 40 MG/10ML
40 INJECTION, POWDER, LYOPHILIZED, FOR SOLUTION INTRAVENOUS DAILY
Status: DISCONTINUED | OUTPATIENT
Start: 2020-02-29 | End: 2020-03-03

## 2020-02-29 RX ADMIN — ONDANSETRON 8 MG: 2 INJECTION INTRAMUSCULAR; INTRAVENOUS at 05:02

## 2020-02-29 RX ADMIN — HYDROCHLOROTHIAZIDE 12.5 MG: 12.5 TABLET ORAL at 11:02

## 2020-02-29 RX ADMIN — HYDROCODONE BITARTRATE AND ACETAMINOPHEN 1 TABLET: 10; 325 TABLET ORAL at 08:02

## 2020-02-29 RX ADMIN — PANTOPRAZOLE SODIUM 40 MG: 40 INJECTION, POWDER, LYOPHILIZED, FOR SOLUTION INTRAVENOUS at 11:02

## 2020-02-29 RX ADMIN — HYDROCODONE BITARTRATE AND ACETAMINOPHEN 1 TABLET: 10; 325 TABLET ORAL at 05:02

## 2020-02-29 RX ADMIN — HYDROCODONE BITARTRATE AND ACETAMINOPHEN 1 TABLET: 10; 325 TABLET ORAL at 01:02

## 2020-02-29 RX ADMIN — MIRTAZAPINE 15 MG: 15 TABLET, FILM COATED ORAL at 09:02

## 2020-02-29 RX ADMIN — ENOXAPARIN SODIUM 40 MG: 100 INJECTION SUBCUTANEOUS at 05:02

## 2020-02-29 RX ADMIN — LISINOPRIL 20 MG: 20 TABLET ORAL at 11:02

## 2020-02-29 RX ADMIN — ONDANSETRON 8 MG: 2 INJECTION INTRAMUSCULAR; INTRAVENOUS at 02:02

## 2020-02-29 RX ADMIN — SODIUM CHLORIDE, SODIUM LACTATE, POTASSIUM CHLORIDE, AND CALCIUM CHLORIDE: .6; .31; .03; .02 INJECTION, SOLUTION INTRAVENOUS at 12:02

## 2020-02-29 NOTE — NURSING
Patient arrived to floor via EMS. Vitals WNL. Appears to be in no pain, discomfort or distress. NGT to right nare. Continuous IV fluids infusing. Will continue to monitor.

## 2020-02-29 NOTE — PLAN OF CARE
Problem: Adult Inpatient Plan of Care  Goal: Plan of Care Review  Outcome: Ongoing, Progressing  Flowsheets (Taken 2/28/2020 2355)  Plan of Care Reviewed With: patient  Goal: Absence of Hospital-Acquired Illness or Injury  Outcome: Ongoing, Progressing  Intervention: Identify and Manage Fall Risk  Flowsheets (Taken 2/28/2020 2355)  Safety Promotion/Fall Prevention: assistive device/personal item within reach; bed alarm set; diversional activities provided; Fall Risk reviewed with patient/family; Fall Risk signage in place; high risk medications identified; medications reviewed; nonskid shoes/socks when out of bed; side rails raised x 3; supervised activity; instructed to call staff for mobility  Intervention: Prevent VTE (venous thromboembolism)  Flowsheets (Taken 2/28/2020 2355)  VTE Prevention/Management: dorsiflexion/plantar flexion performed; remove, assess skin and reapply sequential compression device  Goal: Optimal Comfort and Wellbeing  Outcome: Ongoing, Progressing  Intervention: Provide Person-Centered Care  Flowsheets (Taken 2/28/2020 2355)  Trust Relationship/Rapport: care explained; questions answered; choices provided; questions encouraged; emotional support provided; thoughts/feelings acknowledged; reassurance provided; empathic listening provided     Problem: Nausea and Vomiting (Intestinal Obstruction)  Goal: Nausea and Vomiting Relief  Outcome: Ongoing, Progressing  Intervention: Prevent and Manage Nausea and Vomiting  Flowsheets (Taken 2/28/2020 2355)  Aspiration Precautions: upright posture maintained  Nausea/Vomiting Interventions: nasogastric tube to suction     Problem: Pain (Intestinal Obstruction)  Goal: Acceptable Pain Control  Outcome: Ongoing, Progressing  Intervention: Monitor and Manage Pain  Flowsheets (Taken 2/28/2020 2355)  Diversional Activities: television  Pain Management Interventions: care clustered; diversional activity provided; pain management plan reviewed with  patient/caregiver     Cued into patient's room.  Permission received per patient to turn camera to view patient.  Introduced as VN for night shift that will be working with floor nurse and nursing assistant.  Educated patient on VN's role in patient care. Plan of care reviewed with patient. Education per flowsheet.  Opportunity given for questions and questions answered.  Admission assessment questions answered.  Denies pain, n/v, and sob.  No complaints.  Instructed to call for assistance.  Will cont to monitor.    Labs, notes, and orders reviewed.

## 2020-02-29 NOTE — ED NOTES
aasi here for transfer transportation. Report given. Pt requesting pain medication prior to departure.

## 2020-02-29 NOTE — PLAN OF CARE
Alert and oriented x4. Continuous IV fluids initiated. Prn Zofran administered for nausea. Afebrile. NPO. Safety maintained. Will continue to monitor.

## 2020-02-29 NOTE — PROGRESS NOTES
Patient c/o MD [facility]iquita [facility] notified. Home medication, Norco, reordered and administered. Will continue to monitor.

## 2020-02-29 NOTE — PLAN OF CARE
Patient AAOx4, VSS, patient c/o pain, managed with PRN norco. NGT in place connected to LIWS per MD order. Remains NPO. Patient tolerating activity, up to bathroom. Free from falls. Call bell in reach. Safety maintained. Will continue to monitor.   Problem: Adult Inpatient Plan of Care  Goal: Plan of Care Review  Outcome: Ongoing, Progressing  Goal: Patient-Specific Goal (Individualization)  Outcome: Ongoing, Progressing  Goal: Absence of Hospital-Acquired Illness or Injury  Outcome: Ongoing, Progressing  Goal: Optimal Comfort and Wellbeing  Outcome: Ongoing, Progressing  Goal: Readiness for Transition of Care  Outcome: Ongoing, Progressing  Goal: Rounds/Family Conference  Outcome: Ongoing, Progressing     Problem: Nausea and Vomiting (Intestinal Obstruction)  Goal: Nausea and Vomiting Relief  Outcome: Ongoing, Progressing     Problem: Pain (Intestinal Obstruction)  Goal: Acceptable Pain Control  Outcome: Ongoing, Progressing     Problem: Pain Acute (Oncology Care)  Goal: Optimal Pain Control  Outcome: Ongoing, Progressing     Problem: Fall Injury Risk  Goal: Absence of Fall and Fall-Related Injury  Outcome: Ongoing, Progressing

## 2020-03-01 ENCOUNTER — ANESTHESIA EVENT (OUTPATIENT)
Dept: MEDSURG UNIT | Facility: HOSPITAL | Age: 65
End: 2020-03-01

## 2020-03-01 LAB
ANION GAP SERPL CALC-SCNC: 14 MMOL/L (ref 8–16)
BASOPHILS # BLD AUTO: 0.07 K/UL (ref 0–0.2)
BASOPHILS NFR BLD: 0.6 % (ref 0–1.9)
BUN SERPL-MCNC: 8 MG/DL (ref 8–23)
CALCIUM SERPL-MCNC: 8 MG/DL (ref 8.7–10.5)
CHLORIDE SERPL-SCNC: 99 MMOL/L (ref 95–110)
CO2 SERPL-SCNC: 22 MMOL/L (ref 23–29)
CREAT SERPL-MCNC: 0.6 MG/DL (ref 0.5–1.4)
DIFFERENTIAL METHOD: ABNORMAL
EOSINOPHIL # BLD AUTO: 0.1 K/UL (ref 0–0.5)
EOSINOPHIL NFR BLD: 1 % (ref 0–8)
ERYTHROCYTE [DISTWIDTH] IN BLOOD BY AUTOMATED COUNT: 16.4 % (ref 11.5–14.5)
EST. GFR  (AFRICAN AMERICAN): >60 ML/MIN/1.73 M^2
EST. GFR  (NON AFRICAN AMERICAN): >60 ML/MIN/1.73 M^2
GLUCOSE SERPL-MCNC: 81 MG/DL (ref 70–110)
HCT VFR BLD AUTO: 29.4 % (ref 37–48.5)
HGB BLD-MCNC: 9.7 G/DL (ref 12–16)
IMM GRANULOCYTES # BLD AUTO: 0.06 K/UL (ref 0–0.04)
IMM GRANULOCYTES NFR BLD AUTO: 0.6 % (ref 0–0.5)
LYMPHOCYTES # BLD AUTO: 0.9 K/UL (ref 1–4.8)
LYMPHOCYTES NFR BLD: 8.6 % (ref 18–48)
MCH RBC QN AUTO: 31.4 PG (ref 27–31)
MCHC RBC AUTO-ENTMCNC: 33 G/DL (ref 32–36)
MCV RBC AUTO: 95 FL (ref 82–98)
MONOCYTES # BLD AUTO: 1 K/UL (ref 0.3–1)
MONOCYTES NFR BLD: 9.6 % (ref 4–15)
NEUTROPHILS # BLD AUTO: 8.6 K/UL (ref 1.8–7.7)
NEUTROPHILS NFR BLD: 79.6 % (ref 38–73)
NRBC BLD-RTO: 0 /100 WBC
PLATELET # BLD AUTO: 353 K/UL (ref 150–350)
PMV BLD AUTO: 9.8 FL (ref 9.2–12.9)
POTASSIUM SERPL-SCNC: 3 MMOL/L (ref 3.5–5.1)
RBC # BLD AUTO: 3.09 M/UL (ref 4–5.4)
SODIUM SERPL-SCNC: 135 MMOL/L (ref 136–145)
WBC # BLD AUTO: 10.81 K/UL (ref 3.9–12.7)

## 2020-03-01 PROCEDURE — 36415 COLL VENOUS BLD VENIPUNCTURE: CPT

## 2020-03-01 PROCEDURE — 63600175 PHARM REV CODE 636 W HCPCS: Performed by: SURGERY

## 2020-03-01 PROCEDURE — 99238 HOSP IP/OBS DSCHRG MGMT 30/<: CPT | Mod: ,,, | Performed by: SURGERY

## 2020-03-01 PROCEDURE — 99223 PR INITIAL HOSPITAL CARE,LEVL III: ICD-10-PCS | Mod: ,,, | Performed by: INTERNAL MEDICINE

## 2020-03-01 PROCEDURE — C9113 INJ PANTOPRAZOLE SODIUM, VIA: HCPCS | Performed by: SURGERY

## 2020-03-01 PROCEDURE — 99238 PR HOSPITAL DISCHARGE DAY,<30 MIN: ICD-10-PCS | Mod: ,,, | Performed by: SURGERY

## 2020-03-01 PROCEDURE — 99223 1ST HOSP IP/OBS HIGH 75: CPT | Mod: ,,, | Performed by: INTERNAL MEDICINE

## 2020-03-01 PROCEDURE — 25500020 PHARM REV CODE 255: Performed by: SURGERY

## 2020-03-01 PROCEDURE — 80048 BASIC METABOLIC PNL TOTAL CA: CPT

## 2020-03-01 PROCEDURE — 25000003 PHARM REV CODE 250: Performed by: SURGERY

## 2020-03-01 PROCEDURE — 11000001 HC ACUTE MED/SURG PRIVATE ROOM

## 2020-03-01 PROCEDURE — 85025 COMPLETE CBC W/AUTO DIFF WBC: CPT

## 2020-03-01 RX ORDER — POTASSIUM CHLORIDE 20 MEQ/1
40 TABLET, EXTENDED RELEASE ORAL 2 TIMES DAILY
Status: COMPLETED | OUTPATIENT
Start: 2020-03-01 | End: 2020-03-01

## 2020-03-01 RX ORDER — DEXTROSE MONOHYDRATE, SODIUM CHLORIDE, AND POTASSIUM CHLORIDE 50; 1.49; 4.5 G/1000ML; G/1000ML; G/1000ML
INJECTION, SOLUTION INTRAVENOUS CONTINUOUS
Status: DISCONTINUED | OUTPATIENT
Start: 2020-03-01 | End: 2020-03-02

## 2020-03-01 RX ADMIN — HYDROCHLOROTHIAZIDE 12.5 MG: 12.5 TABLET ORAL at 08:03

## 2020-03-01 RX ADMIN — DIATRIZOATE MEGLUMINE AND DIATRIZOATE SODIUM 50 ML: 660; 100 LIQUID ORAL; RECTAL at 08:03

## 2020-03-01 RX ADMIN — POTASSIUM CHLORIDE, DEXTROSE MONOHYDRATE AND SODIUM CHLORIDE: 150; 5; 450 INJECTION, SOLUTION INTRAVENOUS at 09:03

## 2020-03-01 RX ADMIN — PANTOPRAZOLE SODIUM 40 MG: 40 INJECTION, POWDER, LYOPHILIZED, FOR SOLUTION INTRAVENOUS at 09:03

## 2020-03-01 RX ADMIN — SODIUM CHLORIDE, SODIUM LACTATE, POTASSIUM CHLORIDE, AND CALCIUM CHLORIDE: .6; .31; .03; .02 INJECTION, SOLUTION INTRAVENOUS at 06:03

## 2020-03-01 RX ADMIN — AMLODIPINE BESYLATE 5 MG: 5 TABLET ORAL at 08:03

## 2020-03-01 RX ADMIN — ENOXAPARIN SODIUM 40 MG: 100 INJECTION SUBCUTANEOUS at 05:03

## 2020-03-01 RX ADMIN — MIRTAZAPINE 15 MG: 15 TABLET, FILM COATED ORAL at 11:03

## 2020-03-01 RX ADMIN — HYDROCODONE BITARTRATE AND ACETAMINOPHEN 1 TABLET: 10; 325 TABLET ORAL at 01:03

## 2020-03-01 RX ADMIN — POTASSIUM CHLORIDE 40 MEQ: 1500 TABLET, EXTENDED RELEASE ORAL at 08:03

## 2020-03-01 RX ADMIN — POTASSIUM CHLORIDE, DEXTROSE MONOHYDRATE AND SODIUM CHLORIDE: 150; 5; 450 INJECTION, SOLUTION INTRAVENOUS at 05:03

## 2020-03-01 RX ADMIN — LISINOPRIL 20 MG: 20 TABLET ORAL at 08:03

## 2020-03-01 RX ADMIN — POTASSIUM CHLORIDE 40 MEQ: 1500 TABLET, EXTENDED RELEASE ORAL at 11:03

## 2020-03-01 NOTE — ASSESSMENT & PLAN NOTE
With nausea vomiting, likely secondary to pancreatic cancer however other etiologies cannot be excluded  A stent was placed in January and does seem to be working, as evidence by presence of contrast distally  Will follow up with 2nd portion of study  Consider EGD with stent revision tomorrow  Could consider gastrostomy placement for palliative venting as previously discussed.  Will await discussion with advanced endoscopy team and findings of aforementioned studies.

## 2020-03-01 NOTE — SUBJECTIVE & OBJECTIVE
Past Medical History:   Diagnosis Date    Hypertension     Pancreatic cancer        Past Surgical History:   Procedure Laterality Date    APPENDECTOMY      ENDOSCOPIC ULTRASOUND OF UPPER GASTROINTESTINAL TRACT N/A 1/20/2020    Procedure: ULTRASOUND, UPPER GI TRACT, ENDOSCOPIC;  Surgeon: Toni Messer MD;  Location: [facility] ENDO (2ND FLR);  Service: Endoscopy;  Laterality: N/A;    ESOPHAGOGASTRODUODENOSCOPY N/A 1/22/2020    Procedure: EGD (ESOPHAGOGASTRODUODENOSCOPY);  Surgeon: Cory Servin MD;  [facility]  Service: Endoscopy;  Laterality: N/A;    intestinal stent         Review of patient's allergies indicates:  No Known Allergies  Family History     Problem Relation (Age of Onset)    Breast cancer Mother    Colon cancer Mother    Depression Daughter    Pancreatic cancer Maternal Uncle    Prostate cancer Brother        Tobacco Use    Smoking status: Never Smoker    Smokeless tobacco: Never Used   Substance and Sexual Activity    Alcohol use: Never     Frequency: Never    Drug use: Never    Sexual activity: Not Currently     Review of Systems   Constitutional: Positive for unexpected weight change. Negative for chills and fever.   HENT: Negative for congestion and trouble swallowing.    Eyes: Negative for photophobia and visual disturbance.   Respiratory: Negative for cough and shortness of breath.    Cardiovascular: Negative for chest pain and leg swelling.   Gastrointestinal: Positive for abdominal distention, abdominal pain, nausea and vomiting.   Genitourinary: Negative for dysuria and hematuria.   Musculoskeletal: Negative for arthralgias and myalgias.   Skin: Negative for color change and rash.   Neurological: Positive for weakness. Negative for dizziness, light-headedness and numbness.   Psychiatric/Behavioral: Negative for agitation and confusion.     Objective:     Vital Signs (Most Recent):  Temp: 97.2 °F (36.2 °C) (03/01/20 1129)  Pulse: 85 (03/01/20 1129)  Resp: 18 (03/01/20  1129)  BP: (!) 147/76 (03/01/20 1129)  SpO2: 97 % (02/28/20 2350) Vital Signs (24h Range):  Temp:  [97.2 °F (36.2 °C)-99.1 °F (37.3 °C)] 97.2 °F (36.2 °C)  Pulse:  [85-97] 85  Resp:  [17-18] 18  BP: (145-175)/(76-85) 147/76     Weight: 68.7 kg (151 lb 7.3 oz) (03/01/20 0514)  Body mass index is 21.12 kg/m².      Intake/Output Summary (Last 24 hours) at 3/1/2020 1207  Last data filed at 3/1/2020 0633  Gross per 24 hour   Intake 2605 ml   Output 2150 ml   Net 455 ml       Lines/Drains/Airways     Drain                 NG/OG Tube 02/28/20 2000 [place] sump;nasogastric 18 Fr. Right nostril 1 day          Peripheral Intravenous Line                 Peripheral IV - Single Lumen 03/01/20 0945 20 G;1 in Anterior;Left Forearm less than 1 day                Physical Exam   Constitutional: She is oriented to person, place, and time. She appears well-developed and well-nourished.   HENT:   Head: Normocephalic and atraumatic.   NG tube in place   Eyes: Pupils are equal, round, and reactive to light. EOM are normal. No scleral icterus.   Neck: Normal range of motion. Neck supple.   Cardiovascular: Normal rate, regular rhythm, normal heart sounds and intact distal pulses.   Pulmonary/Chest: Effort normal and breath sounds normal. No respiratory distress.   Abdominal: Soft. Bowel sounds are normal. She exhibits no distension. There is no tenderness.   Musculoskeletal: Normal range of motion. She exhibits no edema.   Neurological: She is alert and oriented to person, place, and time.   No asterixis   Skin: Skin is warm and dry. No rash noted. No erythema.   Psychiatric: She has a normal mood and affect. Her behavior is normal.   Nursing note and vitals reviewed.      Significant Labs:  Recent Lab Results       03/01/20  0439        Anion Gap 14     Baso # 0.07     Basophil% 0.6     BUN, Bld 8     Calcium 8.0     Chloride 99     CO2 22     Creatinine 0.6     Differential Method Automated     eGFR if  >60     eGFR if non   >60  Comment:  Calculation used to obtain the estimated glomerular filtration  rate (eGFR) is the CKD-EPI equation.        Eos # 0.1     Eosinophil% 1.0     Glucose 81     Gran # (ANC) 8.6     Gran% 79.6     Hematocrit 29.4     Hemoglobin 9.7     Immature Grans (Abs) 0.06  Comment:  Mild elevation in immature granulocytes is non specific and   can be seen in a variety of conditions including stress response,   acute inflammation, trauma and pregnancy. Correlation with other   laboratory and clinical findings is essential.       Immature Granulocytes 0.6     Lymph # 0.9     Lymph% 8.6     MCH 31.4     MCHC 33.0     MCV 95     Mono # 1.0     Mono% 9.6     MPV 9.8     nRBC 0     Platelets 353     Potassium 3.0     RBC 3.09     RDW 16.4     Sodium 135     WBC 10.81           Significant Imaging:  Imaging results within the past 24 hours have been reviewed.

## 2020-03-01 NOTE — CONSULTS
[facility]  Gastroenterology  Consult Note    Patient Name: Radha Alcazar  MRN: [id number]  Admission Date: 2/28/2020  Hospital Length of Stay: 2 days  Code Status: Full Code   Attending Provider: Theron Adam Jr.*   Consulting Provider: Jay Velazquez MD  Primary Care Physician: Hossein Mortensen MD  Principal Problem:Small bowel obstruction    Inpatient consult to [facility]  Consult performed by: Jay Velazquez MD  Consult ordered by: Theron Adam Jr., MD  Reason for consult: n/v. Panc cancer        Subjective:     HPI:  64-year-old female with past medical history significant for stage IV pancreatic cancer status post duodenal stent placement in January who presented as transfer from outside emergency department for decreased p.o. tolerance and abdominal pain. Apparently after stenting patient reported significant improvement in nausea and vomiting and was able to tolerate some food.  However, over the past week nausea vomiting has progressively worsened.  Initially overt responded to Zofran however on the 3 days prior to presentation this medication was no longer working.  Given persistence patient presented to the emergency department where in she tube was placed with significant improvement complaints.  CT was performed which showed partial obstruction of duodenum. Out of concern for stent occlusion patient was transferred to [facility] in escalation of care.    This morning patient reports doing somewhat better.  She denies further nausea since NG tube placement.  First portion of small bowel follow-through performed this morning:  Images reviewed and does show contrast entering the small bowel distal to the stent.    Past Medical History:   Diagnosis Date    Hypertension     Pancreatic cancer        Past Surgical History:   Procedure Laterality Date    APPENDECTOMY      ENDOSCOPIC ULTRASOUND OF UPPER GASTROINTESTINAL TRACT N/A  1/20/2020    Procedure: ULTRASOUND, UPPER GI TRACT, ENDOSCOPIC;  Surgeon: Toni Messer MD;  Location: [facility] ENDO (2ND FLR);  Service: Endoscopy;  Laterality: N/A;    ESOPHAGOGASTRODUODENOSCOPY N/A 1/22/2020    Procedure: EGD (ESOPHAGOGASTRODUODENOSCOPY);  Surgeon: Cory Servin MD;  Location: [facility] ENDO (2ND FLR);  Service: Endoscopy;  Laterality: N/A;    intestinal stent         Review of patient's allergies indicates:  No Known Allergies  Family History     Problem Relation (Age of Onset)    Breast cancer Mother    Colon cancer Mother    Depression Daughter    Pancreatic cancer Maternal Uncle    Prostate cancer Brother        Tobacco Use    Smoking status: Never Smoker    Smokeless tobacco: Never Used   Substance and Sexual Activity    Alcohol use: Never     Frequency: Never    Drug use: Never    Sexual activity: Not Currently     Review of Systems   Constitutional: Positive for unexpected weight change. Negative for chills and fever.   HENT: Negative for congestion and trouble swallowing.    Eyes: Negative for photophobia and visual disturbance.   Respiratory: Negative for cough and shortness of breath.    Cardiovascular: Negative for chest pain and leg swelling.   Gastrointestinal: Positive for abdominal distention, abdominal pain, nausea and vomiting.   Genitourinary: Negative for dysuria and hematuria.   Musculoskeletal: Negative for arthralgias and myalgias.   Skin: Negative for color change and rash.   Neurological: Positive for weakness. Negative for dizziness, light-headedness and numbness.   Psychiatric/Behavioral: Negative for agitation and confusion.     Objective:     Vital Signs (Most Recent):  Temp: 97.2 °F (36.2 °C) (03/01/20 1129)  Pulse: 85 (03/01/20 1129)  Resp: 18 (03/01/20 1129)  BP: (!) 147/76 (03/01/20 1129)  SpO2: 97 % (02/28/20 2350) Vital Signs (24h Range):  Temp:  [97.2 °F (36.2 °C)-99.1 °F (37.3 °C)] 97.2 °F (36.2 °C)  Pulse:  [85-97] 85  Resp:  [17-18] 18  BP: (145-175)/(76-85)  147/76     Weight: 68.7 kg (151 lb 7.3 oz) (03/01/20 0514)  Body mass index is 21.12 kg/m².      Intake/Output Summary (Last 24 hours) at 3/1/2020 1207  Last data filed at 3/1/2020 0633  Gross per 24 hour   Intake 2605 ml   Output 2150 ml   Net 455 ml       Lines/Drains/Airways     Drain                 NG/OG Tube 02/28/20 2000 [place] sump;nasogastric 18 Fr. Right nostril 1 day          Peripheral Intravenous Line                 Peripheral IV - Single Lumen 03/01/20 0945 20 G;1 in Anterior;Left Forearm less than 1 day                Physical Exam   Constitutional: She is oriented to person, place, and time. She appears well-developed and well-nourished.   HENT:   Head: Normocephalic and atraumatic.   NG tube in place   Eyes: Pupils are equal, round, and reactive to light. EOM are normal. No scleral icterus.   Neck: Normal range of motion. Neck supple.   Cardiovascular: Normal rate, regular rhythm, normal heart sounds and intact distal pulses.   Pulmonary/Chest: Effort normal and breath sounds normal. No respiratory distress.   Abdominal: Soft. Bowel sounds are normal. She exhibits no distension. There is no tenderness.   Musculoskeletal: Normal range of motion. She exhibits no edema.   Neurological: She is alert and oriented to person, place, and time.   No asterixis   Skin: Skin is warm and dry. No rash noted. No erythema.   Psychiatric: She has a normal mood and affect. Her behavior is normal.   Nursing note and vitals reviewed.      Significant Labs:  Recent Lab Results       03/01/20  0439        Anion Gap 14     Baso # 0.07     Basophil% 0.6     BUN, Bld 8     Calcium 8.0     Chloride 99     CO2 22     Creatinine 0.6     Differential Method Automated     eGFR if  >60     eGFR if non  >60  Comment:  Calculation used to obtain the estimated glomerular filtration  rate (eGFR) is the CKD-EPI equation.        Eos # 0.1     Eosinophil% 1.0     Glucose 81     Gran # (ANC) 8.6     Gran%  79.6     Hematocrit 29.4     Hemoglobin 9.7     Immature Grans (Abs) 0.06  Comment:  Mild elevation in immature granulocytes is non specific and   can be seen in a variety of conditions including stress response,   acute inflammation, trauma and pregnancy. Correlation with other   laboratory and clinical findings is essential.       Immature Granulocytes 0.6     Lymph # 0.9     Lymph% 8.6     MCH 31.4     MCHC 33.0     MCV 95     Mono # 1.0     Mono% 9.6     MPV 9.8     nRBC 0     Platelets 353     Potassium 3.0     RBC 3.09     RDW 16.4     Sodium 135     WBC 10.81           Significant Imaging:  Imaging results within the past 24 hours have been reviewed.    Assessment/Plan:     * Small bowel obstruction  With nausea vomiting, likely secondary to pancreatic cancer however other etiologies cannot be excluded  A stent was placed in January and does seem to be working, as evidence by presence of contrast distally  Will follow up with 2nd portion of study  Consider EGD with stent revision tomorrow  Could consider gastrostomy placement for palliative venting as previously discussed.  Will await discussion with advanced endoscopy team and findings of aforementioned studies.        Thank you for your consult. I will follow-up with patient. Please contact us if you have any additional questions.    Jay Velazquez MD  Gastroenterology  [facility]

## 2020-03-01 NOTE — PLAN OF CARE
Patient is AAO x 4. Vital signs stable. Remains NPO, NGT to Low intermittent suction. On IV fluids. Prn Narco given for pain with good effect. Ambulated to the toilet and voided freely. Safety measures maintained. Slept fairly well. Will continue to monitor patient.

## 2020-03-01 NOTE — HPI
64-year-old female with past medical history significant for stage IV pancreatic cancer status post duodenal stent placement in January who presented as transfer from outside emergency department for decreased p.o. tolerance and abdominal pain. Apparently after stenting patient reported significant improvement in nausea and vomiting and was able to tolerate some food.  However, over the past week nausea vomiting has progressively worsened.  Initially overt responded to Zofran however on the 3 days prior to presentation this medication was no longer working.  Given persistence patient presented to the emergency department where in she tube was placed with significant improvement complaints.  CT was performed which showed partial obstruction of duodenum. Out of concern for stent occlusion patient was transferred to [facility] in escalation of care.    This morning patient reports doing somewhat better.  She denies further nausea since NG tube placement.  First portion of small bowel follow-through performed this morning:  Images reviewed and does show contrast entering the small bowel distal to the stent.

## 2020-03-01 NOTE — ANESTHESIA PREPROCEDURE EVALUATION
03/01/2020  Radha Alcazar is a 64 y.o., female with stage IV pancreatic cancer s/p duodenal stent placement 1/22/20. Currently has small bowel obstruction with NG tube in place. Here for EGD with duodenal stent revision.     Last emesis: Thursday, 1/27/2020  Denies nausea since NG Tube placement.      Patient Active Problem List   Diagnosis    Abdominal pain    Hypertension    Intractable nausea and vomiting    Pancreatic mass    Lung consolidation    Pleural effusion    Abnormal CT of the abdomen    Hypokalemia    Transaminitis    Leukocytosis    Normocytic anemia    Depressed mood    Severe malnutrition    Pancreatic adenocarcinoma    Weight loss    Depression    Adjustment disorder with mixed anxiety and depressed mood    Small bowel obstruction       Review of patient's allergies indicates:  No Known Allergies    Current Facility-Administered Medications on File Prior to Visit   Medication Dose Route Frequency Provider Last Rate Last Dose    amLODIPine tablet 5 mg  5 mg Oral Daily Theron Adam Jr., MD   5 mg at 03/01/20 0858    dextrose 5 % and 0.45 % NaCl with KCl 20 mEq infusion   Intravenous Continuous Theron Adam Jr.,  mL/hr at 03/01/20 0921      enoxaparin injection 40 mg  40 mg Subcutaneous Daily Theron Adam Jr., MD   40 mg at 02/29/20 1744    hydroCHLOROthiazide tablet 12.5 mg  12.5 mg Oral Daily Theron Adam Jr., MD   12.5 mg at 03/01/20 0859    HYDROcodone-acetaminophen  mg per tablet 1 tablet  1 tablet Oral Q4H PRN Theron Adam Jr., MD   1 tablet at 03/01/20 0149    lisinopril tablet 20 mg  20 mg Oral Daily Theron Adam Jr., MD   20 mg at 03/01/20 0858    mirtazapine tablet 15 mg  15 mg Oral QHS Theron Adam Jr., MD   15 mg at 02/29/20 2154    ondansetron injection 8 mg  8 mg Intravenous Q6H PRN  Theron Adam Jr., MD   8 mg at 02/29/20 1744    pantoprazole injection 40 mg  40 mg Intravenous Daily Theron Adam Jr., MD   40 mg at 03/01/20 0948    potassium chloride SA CR tablet 40 mEq  40 mEq Oral BID Theron Adam Jr., MD   40 mEq at 03/01/20 0859    promethazine 6.25 mg/5 mL syrup 25 mg  25 mg Oral Q6H PRN Theron Adam Jr., MD        sodium chloride 0.9% flush 10 mL  10 mL Intravenous PRN Theron Adam Jr., MD         Current Outpatient Medications on File Prior to Visit   Medication Sig Dispense Refill    amLODIPine (NORVASC) 5 MG tablet Take 1 tablet (5 mg total) by mouth once daily. 30 tablet 11    aspirin-acetaminophen-caffeine 250-250-65 mg (EXCEDRIN EXTRA STRENGTH) 250-250-65 mg per tablet Take 2 tablets by mouth daily as needed for Pain.      calcium carbonate (TUMS) 200 mg calcium (500 mg) chewable tablet Take 1 tablet (500 mg total) by mouth daily as needed.      famotidine (PEPCID) 20 MG tablet Take 1 tablet (20 mg total) by mouth daily as needed (heart burn). 21 tablet 3    HYDROcodone-acetaminophen (NORCO)  mg per tablet hydrocodone 10 mg-acetaminophen 325 mg tablet   TAKE 1 TABLET BY MOUTH 4 TIMES DAILY AS NEEDED FOR PAIN.      HYDROcodone-acetaminophen (NORCO) 5-325 mg per tablet Take 1 tablet by mouth every 6 (six) hours as needed. 28 tablet 0    lisinopril-hydrochlorothiazide (PRINZIDE,ZESTORETIC) 20-12.5 mg per tablet Take 1 tablet by mouth once daily.      mirtazapine (REMERON) 15 MG tablet Take 1 tablet (15 mg total) by mouth nightly. 30 tablet 2    ondansetron (ZOFRAN) 8 MG tablet Take 8 mg by mouth every 8 (eight) hours.      ondansetron (ZOFRAN-ODT) 4 MG TbDL Take 1 tablet (4 mg total) by mouth every 8 (eight) hours as needed. 30 tablet 0    pantoprazole (PROTONIX) 40 MG tablet Take 40 mg by mouth once daily.      promethazine (PHENERGAN) 25 MG suppository Place 25 mg rectally every 6 (six) hours as needed for Nausea.       sucralfate (CARAFATE) 1 gram tablet Take 1 g by mouth 4 (four) times daily.      zolpidem (AMBIEN) 10 mg Tab Take 5 mg by mouth nightly as needed.         Past Surgical History:   Procedure Laterality Date    APPENDECTOMY      ENDOSCOPIC ULTRASOUND OF UPPER GASTROINTESTINAL TRACT N/A 1/20/2020    Procedure: ULTRASOUND, UPPER GI TRACT, ENDOSCOPIC;  Surgeon: Toni Messer MD;  Location: [address]);  Service: Endoscopy;  Laterality: N/A;    ESOPHAGOGASTRODUODENOSCOPY N/A 1/22/2020    Procedure: EGD (ESOPHAGOGASTRODUODENOSCOPY);  Surgeon: Cory Servin MD;  Location: [address]);  Service: Endoscopy;  Laterality: N/A;    intestinal stent         Social History     Socioeconomic History    Marital status:      Spouse name: Not on file    Number of children: 3    Years of education: Not on file    Highest education level: Not on file   Occupational History    Not on file   Social Needs    Financial resource strain: Not on file    Food insecurity:     Worry: Not on file     Inability: Not on file    Transportation needs:     Medical: Not on file     Non-medical: Not on file   Tobacco Use    Smoking status: Never Smoker    Smokeless tobacco: Never Used   Substance and Sexual Activity    Alcohol use: Never     Frequency: Never    Drug use: Never    Sexual activity: Not Currently   Lifestyle    Physical activity:     Days per week: Not on file     Minutes per session: Not on file    Stress: Not on file   Relationships    Social connections:     Talks on phone: Not on file     Gets together: Not on file     Attends Mosque service: Not on file     Active member of club or organization: Not on file     Attends meetings of clubs or organizations: Not on file     Relationship status: Not on file   Other Topics Concern    Patient feels they ought to cut down on drinking/drug use Not Asked    Patient annoyed by others criticizing their drinking/drug use Not Asked    Patient has  felt bad or guilty about drinking/drug use Not Asked    Patient has had a drink/used drugs as an eye opener in the AM Not Asked   Social History Narrative     ( in nursing home with dementia since 12/2019)    3 adult children (Talita- lives in [facility]); 6 grandchildren         CBC:   Recent Labs     02/28/20  1641 03/01/20  0439   WBC 11.63 10.81   RBC 3.52* 3.09*   HGB 11.3* 9.7*   HCT 33.5* 29.4*   * 353*   MCV 95 95   MCH 32.1* 31.4*   MCHC 33.7 33.0       CMP:   Recent Labs     02/28/20  1641 03/01/20  0439    135*   K 3.4* 3.0*   CL 97 99   CO2 26 22*   BUN 12 8   CREATININE 0.7 0.6    81   CALCIUM 9.0 8.0*   ALBUMIN 2.5*  --    PROT 6.6  --    ALKPHOS 303*  --    ALT 29  --    AST 44*  --    BILITOT 0.8  --        INR  No results for input(s): PT, INR, PROTIME, APTT in the last 72 hours.        Pre-op Assessment    I have reviewed the Patient Summary Reports.      I have reviewed the Medications.     Review of Systems  Anesthesia Hx:  No problems with previous Anesthesia   Denies Personal Hx of Anesthesia complications.   Cardiovascular:   Exercise tolerance: good Hypertension    Pulmonary:  Pulmonary Normal    Hepatic/GI:   Small bowel obstruction with NG tube in place.    Has duodenal stent placed 1/22/20   Neurological:  Neurology Normal    Endocrine:  Endocrine Normal    Psych:   Psychiatric History depression          Physical Exam  General:  Well nourished    Airway/Jaw/Neck:  Airway Findings: Mouth Opening: Normal Tongue: Normal  General Airway Assessment: Adult  Mallampati: II  Improves to II with phonation.  TM Distance: 4 - 6 cm      Dental:  Dental Findings: In tact    Chest/Lungs:  Chest/Lungs Clear    Heart/Vascular:  Heart Findings: Normal       Mental Status:  Mental Status Findings:  Cooperative, Alert and Oriented         Anesthesia Plan  Type of Anesthesia, risks & benefits discussed:  Anesthesia Type:  general,  MAC  Patient's Preference:   Intra-op Monitoring Plan: standard ASA monitors  Intra-op Monitoring Plan Comments:   Post Op Pain Control Plan: multimodal analgesia  Post Op Pain Control Plan Comments:   Induction:   IV  Beta Blocker:  Patient is not currently on a Beta-Blocker (No further documentation required).       Informed Consent: Patient understands risks and agrees with Anesthesia plan.  Questions answered. Anesthesia consent signed with patient.  ASA Score: 3     Day of Surgery Review of History & Physical:        Anesthesia Plan Notes: Consider General anesthesia as patient has high risk for aspiration.        Ready For Surgery From Anesthesia Perspective.

## 2020-03-01 NOTE — PROGRESS NOTES
[facility]  PROGRESS NOTE    HPI: Radha Alcazar is a 64 y.o. female with stage IV pancreatic cancer currently not seeking any palliative care who is admitted for suspected proximal small-bowel obstruction due to duodenal compression and restenoses.    INTERVAL HISTORY:  Patient had high residuals yesterday therefore NG tube continued on intermittent low wall suction.  Nausea has improved.  Pain well controlled.  States she is feels drained which she attributes to poor p.o. intake over the past few days.  Upper GI series was not completed.    VITALS:  Temp:  [97.5 °F (36.4 °C)-99.1 °F (37.3 °C)] 98.1 °F (36.7 °C)  Pulse:  [85-97] 85  Resp:  [17-18] 18  BP: (143-175)/(72-85) 175/83    I&Os:  I/O last 3 completed shifts:  In: 3140 [I.V.:2990; NG/GT:150]  Out: 2350 [Urine:1400; Drains:950]    PHYSICAL EXAM:  GEN:  No acute distress, alert orient x3  HEENT:  Anicteric sclera, NG tube in place with bilious fluid in the canister  CV:  Regular rate rhythm  RESP:  Nonlabored breathing  ABD:  Soft, nondistended, nontender  EXT:  No significant edema    LABS:  CBC:   Recent Labs   Lab 03/01/20  0439   WBC 10.81   RBC 3.09*   HGB 9.7*   HCT 29.4*   *   MCV 95   MCH 31.4*   MCHC 33.0     BMP:   Recent Labs   Lab 03/01/20  0439   GLU 81   *   K 3.0*   CL 99   CO2 22*   BUN 8   CREATININE 0.6   CALCIUM 8.0*     Labs within the past 24 hours have been reviewed.      ASSESSMENT & PLAN:  64 y.o. female     Stage IV pancreatic cancer with duodenal stenosis  - patient with high residuals yesterday therefore NG tube returned to intermittent low wall suction  - will obtain bedside Gastrografin study since formal upper GI series not being completed  - GI consult for likely restenoses of duodenal stent, consider re-expanded versus replacement versus venting G-tube  - previously  offered the patient a palliative care consult, she states she has recently been in discussion in regards to hospice but at this  time would prefer to stay at home if she can tolerate p.o.  - p.r.n. Zofran and Phenergan for nausea  - Norco  for pain  -  D5 1/2 NS with KCl 20 mEq at 125    Hypokalemia  - replace     Hypertension  - restart home dose of lisinopril HCTZ and Norvasc     PPX  - SCDs, will also start Lovenox  - PPI

## 2020-03-01 NOTE — PLAN OF CARE
Patient educated on the importance of fluid replace when she asked to be taken off of her IV fluids.  Patient verbalized understanding.

## 2020-03-01 NOTE — NURSING
Epic popup requesting md be notified of no antibiotic given within 6 hours of arrival  Due to a diagnosis of pneumonia   No current diagnosis noted this stay in epic.  But notified md to stay in compliance of epic prompt

## 2020-03-01 NOTE — PLAN OF CARE
Permission attained to enter room via virtual system.  Virtual rounds completed as documented.  Today's lab values, notes, and vital signs up to now have been reviewed. Pt notified of potential gi procedures in the morning, xray films ordered and role of virtual nurse   encourage to utilize throat spray for throat discomfort   Other than throat discomfort pt reports no pain   Only general weakness and fatigue

## 2020-03-02 ENCOUNTER — ANESTHESIA (OUTPATIENT)
Dept: MEDSURG UNIT | Facility: HOSPITAL | Age: 65
End: 2020-03-02

## 2020-03-02 LAB
BASOPHILS # BLD AUTO: 0.04 K/UL (ref 0–0.2)
BASOPHILS NFR BLD: 0.4 % (ref 0–1.9)
DIFFERENTIAL METHOD: ABNORMAL
EOSINOPHIL # BLD AUTO: 0.1 K/UL (ref 0–0.5)
EOSINOPHIL NFR BLD: 1.2 % (ref 0–8)
ERYTHROCYTE [DISTWIDTH] IN BLOOD BY AUTOMATED COUNT: 16.4 % (ref 11.5–14.5)
HCT VFR BLD AUTO: 29.3 % (ref 37–48.5)
HGB BLD-MCNC: 9.9 G/DL (ref 12–16)
IMM GRANULOCYTES # BLD AUTO: 0.05 K/UL (ref 0–0.04)
IMM GRANULOCYTES NFR BLD AUTO: 0.5 % (ref 0–0.5)
LYMPHOCYTES # BLD AUTO: 1.1 K/UL (ref 1–4.8)
LYMPHOCYTES NFR BLD: 9.8 % (ref 18–48)
MCH RBC QN AUTO: 31.3 PG (ref 27–31)
MCHC RBC AUTO-ENTMCNC: 33.8 G/DL (ref 32–36)
MCV RBC AUTO: 93 FL (ref 82–98)
MONOCYTES # BLD AUTO: 1.1 K/UL (ref 0.3–1)
MONOCYTES NFR BLD: 10 % (ref 4–15)
NEUTROPHILS # BLD AUTO: 8.4 K/UL (ref 1.8–7.7)
NEUTROPHILS NFR BLD: 78.1 % (ref 38–73)
NRBC BLD-RTO: 0 /100 WBC
PLATELET # BLD AUTO: 406 K/UL (ref 150–350)
PMV BLD AUTO: 10.3 FL (ref 9.2–12.9)
RBC # BLD AUTO: 3.16 M/UL (ref 4–5.4)
WBC # BLD AUTO: 10.78 K/UL (ref 3.9–12.7)

## 2020-03-02 PROCEDURE — 99232 SBSQ HOSP IP/OBS MODERATE 35: CPT | Mod: GC,,, | Performed by: SURGERY

## 2020-03-02 PROCEDURE — 11000001 HC ACUTE MED/SURG PRIVATE ROOM

## 2020-03-02 PROCEDURE — 63600175 PHARM REV CODE 636 W HCPCS: Performed by: SURGERY

## 2020-03-02 PROCEDURE — 25000003 PHARM REV CODE 250: Performed by: SURGERY

## 2020-03-02 PROCEDURE — 36415 COLL VENOUS BLD VENIPUNCTURE: CPT

## 2020-03-02 PROCEDURE — 25500020 PHARM REV CODE 255: Performed by: SURGERY

## 2020-03-02 PROCEDURE — 99232 PR SUBSEQUENT HOSPITAL CARE,LEVL II: ICD-10-PCS | Mod: GC,,, | Performed by: SURGERY

## 2020-03-02 PROCEDURE — 85025 COMPLETE CBC W/AUTO DIFF WBC: CPT

## 2020-03-02 PROCEDURE — C9113 INJ PANTOPRAZOLE SODIUM, VIA: HCPCS | Performed by: SURGERY

## 2020-03-02 RX ADMIN — PANTOPRAZOLE SODIUM 40 MG: 40 INJECTION, POWDER, LYOPHILIZED, FOR SOLUTION INTRAVENOUS at 09:03

## 2020-03-02 RX ADMIN — POTASSIUM CHLORIDE, DEXTROSE MONOHYDRATE AND SODIUM CHLORIDE: 150; 5; 450 INJECTION, SOLUTION INTRAVENOUS at 01:03

## 2020-03-02 RX ADMIN — HYDROCHLOROTHIAZIDE 12.5 MG: 12.5 TABLET ORAL at 09:03

## 2020-03-02 RX ADMIN — HYDROCODONE BITARTRATE AND ACETAMINOPHEN 1 TABLET: 10; 325 TABLET ORAL at 02:03

## 2020-03-02 RX ADMIN — LISINOPRIL 20 MG: 20 TABLET ORAL at 09:03

## 2020-03-02 RX ADMIN — POTASSIUM CHLORIDE, DEXTROSE MONOHYDRATE AND SODIUM CHLORIDE: 150; 5; 450 INJECTION, SOLUTION INTRAVENOUS at 09:03

## 2020-03-02 RX ADMIN — AMLODIPINE BESYLATE 5 MG: 5 TABLET ORAL at 09:03

## 2020-03-02 RX ADMIN — HYDROCODONE BITARTRATE AND ACETAMINOPHEN 1 TABLET: 10; 325 TABLET ORAL at 06:03

## 2020-03-02 RX ADMIN — DIATRIZOATE MEGLUMINE AND DIATRIZOATE SODIUM 240 ML: 660; 100 LIQUID ORAL; RECTAL at 01:03

## 2020-03-02 RX ADMIN — MIRTAZAPINE 15 MG: 15 TABLET, FILM COATED ORAL at 09:03

## 2020-03-02 RX ADMIN — HYDROCODONE BITARTRATE AND ACETAMINOPHEN 1 TABLET: 10; 325 TABLET ORAL at 09:03

## 2020-03-02 RX ADMIN — ENOXAPARIN SODIUM 40 MG: 100 INJECTION SUBCUTANEOUS at 04:03

## 2020-03-02 NOTE — PLAN OF CARE
[place] met with pt and sister in law Corinne  [phone]   after discharge pt will recuperate with brother Perry Young [address]  [place], [place]  [phone]       pt transferred from  of the [facility] hosp [facility] [place]       pt wants post d/c f/u in the [facility] area.      independent prior to admit, no dme, no hh     pt's  is a NH resident - Alzheimer's dx.          03/02/20 1717   Discharge Assessment   Assessment Type Discharge Planning Assessment   Confirmed/corrected address and phone number on facesheet? Yes   Assessment information obtained from? Patient   Expected Length of Stay (days) 3   Communicated expected length of stay with patient/caregiver yes   Prior to hospitilization cognitive status: Alert/Oriented   Prior to hospitalization functional status: Independent   Current cognitive status: Alert/Oriented   Current Functional Status: Independent   Lives With sibling(s)   Able to Return to Prior Arrangements yes   Is patient able to care for self after discharge? Yes   Who are your caregiver(s) and their phone number(s)? brothceli oYung   [phone] ;  Corinne  [phone]    Patient's perception of discharge disposition home or selfcare   Readmission Within the Last 30 Days   (to [facility]; [place] [facility] )   Patient currently being followed by outpatient case management? No   Patient currently receives any other outside agency services? No   Equipment Currently Used at Home none   Do you have any problems affording any of your prescribed medications? TBD   Does the patient have transportation home? Yes   Transportation Anticipated family or friend will provide   Does the patient receive services at the [facility]? No   Discharge Plan A Home;Home with family   Patient/Family in Agreement with Plan yes

## 2020-03-02 NOTE — PROGRESS NOTES
[facility]  PROGRESS NOTE    HPI: Radha Alcazar is a 64 y.o. female with stage IV pancreatic cancer currently not seeking any palliative care who is admitted for suspected proximal small-bowel obstruction due to duodenal compression and restenoses.    INTERVAL HISTORY:  Contrast study yesterday showed contrast making his wife beyond the duodenum into the colon.  Patient was started on clears which he tolerated.  NG tube was subsequently removed. No significant nausea or vomiting overnight.  Has back pain is chronic in nature.  NPO this morning.      VITALS:  Temp:  [97.2 °F (36.2 °C)-98.8 °F (37.1 °C)] 98.1 °F (36.7 °C)  Pulse:  [] 103  Resp:  [17-18] 18  SpO2:  [99 %] 99 %  BP: (141-162)/(76-99) 143/99    I&Os:  I/O last 3 completed shifts:  In: 5440.4 [P.O.:50; I.V.:5090.4; NG/GT:300]  Out: 3430 [Urine:1850; Drains:1580]    PHYSICAL EXAM:  GEN:  No acute distress, alert orient x3  HEENT:  Anicteric sclera  CV:  Regular rate rhythm  RESP:  Nonlabored breathing  ABD:  Soft, nondistended, nontender  EXT:  No significant edema    LABS:  CBC:   Recent Labs   Lab 03/02/20  0546   WBC 10.78   RBC 3.16*   HGB 9.9*   HCT 29.3*   *   MCV 93   MCH 31.3*   MCHC 33.8     BMP:   Recent Labs   Lab 03/01/20  0439   GLU 81   *   K 3.0*   CL 99   CO2 22*   BUN 8   CREATININE 0.6   CALCIUM 8.0*     Labs within the past 24 hours have been reviewed.      ASSESSMENT & PLAN:  64 y.o. female     Stage IV pancreatic cancer with duodenal stenosis  - contrast was able to traverse the duodenal stent, has tolerated clears and NG tube was removed  - GI on board with possible evaluation endoscopically for stent stenosis  - patient may be a candidate for venting G-tube if stent re-expansion or replacement cannot be performed  - previously  offered the patient a palliative care consult, she states she has recently been in discussion in regards to hospice but at this time would prefer to stay at home if she  can tolerate p.o.  - p.r.n. Zofran and Phenergan for nausea  - Norco  for pain  -  D5 1/2 NS with KCl 20 mEq at 125    Hypertension  - restart home dose of lisinopril HCTZ and Norvasc     PPX  - SCDs, will also start Lovenox  - PPI

## 2020-03-02 NOTE — PLAN OF CARE
Patient is AAO X 4. Vital signs stable. NGT removed at 4 am as per order. Kept her NPO from 12 midnight for possible EGD today. IV fluids in progress. Patient walks to the toilet independently. Voided freely. Will continue to monitor patient.

## 2020-03-03 VITALS
SYSTOLIC BLOOD PRESSURE: 150 MMHG | DIASTOLIC BLOOD PRESSURE: 81 MMHG | BODY MASS INDEX: 21.2 KG/M2 | HEART RATE: 92 BPM | HEIGHT: 71 IN | WEIGHT: 151.44 LBS | OXYGEN SATURATION: 99 % | TEMPERATURE: 98 F | RESPIRATION RATE: 18 BRPM

## 2020-03-03 PROBLEM — K56.609 SMALL BOWEL OBSTRUCTION: Status: RESOLVED | Noted: 2020-02-28 | Resolved: 2020-03-03

## 2020-03-03 PROBLEM — E87.6 HYPOKALEMIA: Status: RESOLVED | Noted: 2020-01-18 | Resolved: 2020-03-03

## 2020-03-03 LAB
ANION GAP SERPL CALC-SCNC: 8 MMOL/L (ref 8–16)
BASOPHILS # BLD AUTO: 0.06 K/UL (ref 0–0.2)
BASOPHILS NFR BLD: 0.8 % (ref 0–1.9)
BUN SERPL-MCNC: 4 MG/DL (ref 8–23)
CALCIUM SERPL-MCNC: 7.6 MG/DL (ref 8.7–10.5)
CHLORIDE SERPL-SCNC: 101 MMOL/L (ref 95–110)
CO2 SERPL-SCNC: 24 MMOL/L (ref 23–29)
CREAT SERPL-MCNC: 0.6 MG/DL (ref 0.5–1.4)
DIFFERENTIAL METHOD: ABNORMAL
EOSINOPHIL # BLD AUTO: 0.2 K/UL (ref 0–0.5)
EOSINOPHIL NFR BLD: 3 % (ref 0–8)
ERYTHROCYTE [DISTWIDTH] IN BLOOD BY AUTOMATED COUNT: 16.6 % (ref 11.5–14.5)
EST. GFR  (AFRICAN AMERICAN): >60 ML/MIN/1.73 M^2
EST. GFR  (NON AFRICAN AMERICAN): >60 ML/MIN/1.73 M^2
GLUCOSE SERPL-MCNC: 102 MG/DL (ref 70–110)
HCT VFR BLD AUTO: 29.5 % (ref 37–48.5)
HGB BLD-MCNC: 9.8 G/DL (ref 12–16)
IMM GRANULOCYTES # BLD AUTO: 0.04 K/UL (ref 0–0.04)
IMM GRANULOCYTES NFR BLD AUTO: 0.5 % (ref 0–0.5)
LYMPHOCYTES # BLD AUTO: 1.3 K/UL (ref 1–4.8)
LYMPHOCYTES NFR BLD: 16.1 % (ref 18–48)
MCH RBC QN AUTO: 31.5 PG (ref 27–31)
MCHC RBC AUTO-ENTMCNC: 33.2 G/DL (ref 32–36)
MCV RBC AUTO: 95 FL (ref 82–98)
MONOCYTES # BLD AUTO: 1 K/UL (ref 0.3–1)
MONOCYTES NFR BLD: 12.2 % (ref 4–15)
NEUTROPHILS # BLD AUTO: 5.4 K/UL (ref 1.8–7.7)
NEUTROPHILS NFR BLD: 67.4 % (ref 38–73)
NRBC BLD-RTO: 0 /100 WBC
PLATELET # BLD AUTO: 370 K/UL (ref 150–350)
PMV BLD AUTO: 9.7 FL (ref 9.2–12.9)
POTASSIUM SERPL-SCNC: 3.1 MMOL/L (ref 3.5–5.1)
RBC # BLD AUTO: 3.11 M/UL (ref 4–5.4)
SODIUM SERPL-SCNC: 133 MMOL/L (ref 136–145)
WBC # BLD AUTO: 7.97 K/UL (ref 3.9–12.7)

## 2020-03-03 PROCEDURE — 25000003 PHARM REV CODE 250: Performed by: SURGERY

## 2020-03-03 PROCEDURE — 99238 HOSP IP/OBS DSCHRG MGMT 30/<: CPT | Mod: ,,, | Performed by: SURGERY

## 2020-03-03 PROCEDURE — 99238 PR HOSPITAL DISCHARGE DAY,<30 MIN: ICD-10-PCS | Mod: ,,, | Performed by: SURGERY

## 2020-03-03 PROCEDURE — 80048 BASIC METABOLIC PNL TOTAL CA: CPT

## 2020-03-03 PROCEDURE — C9113 INJ PANTOPRAZOLE SODIUM, VIA: HCPCS | Performed by: SURGERY

## 2020-03-03 PROCEDURE — 63600175 PHARM REV CODE 636 W HCPCS: Performed by: SURGERY

## 2020-03-03 PROCEDURE — 85025 COMPLETE CBC W/AUTO DIFF WBC: CPT

## 2020-03-03 RX ORDER — POTASSIUM CHLORIDE 20 MEQ/1
60 TABLET, EXTENDED RELEASE ORAL ONCE
Status: COMPLETED | OUTPATIENT
Start: 2020-03-03 | End: 2020-03-03

## 2020-03-03 RX ORDER — POTASSIUM CHLORIDE 20 MEQ/15ML
60 SOLUTION ORAL ONCE
Status: DISCONTINUED | OUTPATIENT
Start: 2020-03-03 | End: 2020-03-03 | Stop reason: HOSPADM

## 2020-03-03 RX ORDER — HYDROCODONE BITARTRATE AND ACETAMINOPHEN 10; 325 MG/1; MG/1
TABLET ORAL
Qty: 30 TABLET | Refills: 0 | Status: SHIPPED | OUTPATIENT
Start: 2020-03-03

## 2020-03-03 RX ORDER — PANTOPRAZOLE SODIUM 40 MG/1
40 TABLET, DELAYED RELEASE ORAL DAILY
Status: DISCONTINUED | OUTPATIENT
Start: 2020-03-04 | End: 2020-03-03 | Stop reason: HOSPADM

## 2020-03-03 RX ORDER — ZOLPIDEM TARTRATE 5 MG/1
5 TABLET ORAL NIGHTLY PRN
Qty: 30 TABLET | Refills: 2 | Status: SHIPPED | OUTPATIENT
Start: 2020-03-03

## 2020-03-03 RX ADMIN — HYDROCHLOROTHIAZIDE 12.5 MG: 12.5 TABLET ORAL at 09:03

## 2020-03-03 RX ADMIN — POTASSIUM CHLORIDE 60 MEQ: 1500 TABLET, EXTENDED RELEASE ORAL at 10:03

## 2020-03-03 RX ADMIN — PANTOPRAZOLE SODIUM 40 MG: 40 INJECTION, POWDER, LYOPHILIZED, FOR SOLUTION INTRAVENOUS at 09:03

## 2020-03-03 RX ADMIN — AMLODIPINE BESYLATE 5 MG: 5 TABLET ORAL at 09:03

## 2020-03-03 RX ADMIN — LISINOPRIL 20 MG: 20 TABLET ORAL at 09:03

## 2020-03-03 RX ADMIN — HYDROCODONE BITARTRATE AND ACETAMINOPHEN 1 TABLET: 10; 325 TABLET ORAL at 09:03

## 2020-03-03 NOTE — PLAN OF CARE
Patient fast asleep at present. Remains on clear liquid diet. Had Hydrocodone once for pain with good effect. Sister in law at bedside. Anticipated discharge today.

## 2020-03-03 NOTE — PROGRESS NOTES
Radha Alcazar #[id number] (CSN: [id number]) (64 y.o. F) (Adm: 02/28/20)   [facility] [id number]   PCP     PHOENIX FREEMAN   Date of Birth     1955   Demographics     Address: Home Phone: Work Phone: Mobile Phone:     [address]  [place] [id number] [phone]    SSN: Insurance: Marital Status: Orthodoxy:      MEDICARE  Hindu    Admission Dx     Small Bowel Obstruction    Small bowel obstruction   Chief Complaint     None   Documents Filed to Patient     Power of  Living Will Clinical Unknown Study Attachment Consent Form ABN Waiver After Visit Summary Lab Result Scan Code Status Patient Portal Status   Not on File Not on File Not on File Not on File Filed Not on File Filed Not on File FULL [Updated on 02/29/20 0904] Active   Auth/Cert Information      Open Auth/Cert for Hospital Account [id number]      Admission Information     Attending Provider Admitting Provider Admission Type Admission Date/Time   MD Theron Zuñiga Jr., Jr., MD Critical Care Direct Admit 02/28/20  2310   Discharge Date Hospital Service Auth/Cert Status Service Area    General Surgery Incomplete [facility]   Unit Room/Bed Admission Status    [facility] MEDICAL SURGICAL UNIT ACUTE K533/K533 A Admission (Confirmed)    Hospital Account     Name Acct ID Class Status Primary Coverage   Radha Alcazar [id number] IP- Inpatient Open MEDICARE - MEDICARE PART A & B          Guarantor Account (for Hospital Account #[id number])     Name Relation to Pt Service Area Active? Acct Type   Radha Alcazar Self OHSSA Yes Personal/Family   Address Phone     [address]  [place], [place] [id number] [phone](H)            Coverage Information (for Hospital Account #[id number])     F/O Payor/Plan Precert #   MEDICARE/MEDICARE PART A & B    Subscriber Subscriber #   Radha Alcazar [id number]   Address Phone   [address]  [place] [address]  [phone]          Emergency Contact Information     Name: Talita Marte Relationship: Daughter   Address: [address] [place] [address]    City: [place] State: [place] Zip: [address] Phone: [phone]    Business phone:

## 2020-03-03 NOTE — DISCHARGE SUMMARY
[facility]  General Surgery  Discharge Summary      Patient Name: Radha Alcazar  MRN: [id number]  Admission Date: 2/28/2020  Hospital Length of Stay: 4 days  Discharge Date and Time:  03/03/2020 8:52 AM  Attending Physician: Theron Adam Jr.*   Discharging Provider: Theron Adam Jr, MD  Primary Care Provider: Hossein Mortensen MD     HPI: 64 y.o. female recently diagnosed with stage IV pancreatic cancer after presenting with weight loss, nausea, vomiting and abdominal pain. Found to have a pancreatic head mass leading to partial small-bowel obstruction of the duodenum, multiple liver metastasis and evidence of peritoneal carcinomatosis.  She underwent duodenal stent on 01/22/2020 to relieve her duodenal obstruction. She was not a surgical candidate.  She met with Oncology and has deferred any palliative chemotherapy.  She presented to outside emergency room with progressive worsening of nausea vomiting and abdominal pain not relieved by Zofran. Symptoms were similar to episodes occurred prior to duodenal stent placement.  A CT scan was performed which showed suspected partial small bowel obstruction at the level of duodenal stent. Transfer was arranged and NG tube was placed    Procedure(s) (LRB):  EGD (ESOPHAGOGASTRODUODENOSCOPY) (N/A)     Hospital Course:  Admitted General surgery with NG tube in place.  Kept on intermittent low wall suction. Ordered upper GI series to evaluate for duodenal stent however this was not completed over the weekend.  Gastrografin bedside x-rays were performed which showed contrast passing into the colon.  Patient began having bowel function.  She was started on clears which he tolerated well.  GI was consulted for evaluation of a possible EGD for stent repositioning or dilatation.  After review of CT scan GI did not think this stent was then in optimal position to be repositioned were intervened on.  Since she was tolerating clear liquids without  significant nausea or vomiting a venting G-tube was deferred.  She was kept on clear liquid diet which she tolerated well without any nausea or vomiting.  She was felt to be stable for discharge home.  Again this patient is not seeking any palliative chemotherapy at this time.  I encouraged her to keep contact with the oncology office in case discussion for hospice/comfort care arrives    The initial hospitalization she was resuscitated with IV fluids.  She was continued on p.r.n. Zofran and Phenergan for nausea and Norco  for pain, these are all home medications.  Her HCTZ, lisinopril and Norvasc were also restarted.  She was on a PPI had SCDs in place and was started on Lovenox.  She required potassium supplementation likely due to NG tube output.    Consults:   Consults (From admission, onward)        Status Ordering Provider     Inpatient consult to [facility]  Once     Provider:  (Not yet assigned)    Completed LIYAH WILLIAM JR     IP consult to case management  Once     Provider:  (Not yet assigned)    Acknowledged LIYAH WILLIAM JR          Significant Diagnostic Studies: Radiology: CT scan: CT ABDOMEN PELVIS WITHOUT CONTRAST: No results found for this visit on 02/28/20.    Pending Diagnostic Studies:     Procedure Component Value Units Date/Time    X-Ray Abdomen AP 1 View [[id number]] Resulted:  03/01/20 1303    Order Status:  Sent Lab Status:  In process Updated:  03/01/20 1358    X-Ray Abdomen AP 1 View [[id number]] Resulted:  03/01/20 0955    Order Status:  Sent Lab Status:  In process Updated:  03/01/20 1004        Final Active Diagnoses:    Diagnosis Date Noted POA    PRINCIPAL PROBLEM:  Pancreatic adenocarcinoma [C25.9] 02/04/2020 Yes      Problems Resolved During this Admission:    Diagnosis Date Noted Date Resolved POA    Small bowel obstruction [K56.609] 02/28/2020 03/03/2020 Yes    Hypokalemia [E87.6] 01/18/2020 03/03/2020 Yes      Discharged Condition:  fair    Disposition: Home or Self Care    Follow Up:    Patient Instructions:      Diet full liquid     Diet Dysphagia Soft     Notify your health care provider if you experience any of the following:  temperature >100.4     Notify your health care provider if you experience any of the following:  persistent nausea and vomiting or diarrhea     Notify your health care provider if you experience any of the following:  severe uncontrolled pain     Activity as tolerated     Medications:  Reconciled Home Medications:      Medication List      CHANGE how you take these medications    zolpidem 5 MG Tab  Commonly known as:  AMBIEN  Take 1 tablet (5 mg total) by mouth nightly as needed.  What changed:  medication strength        CONTINUE taking these medications    calcium carbonate 200 mg calcium (500 mg) chewable tablet  Commonly known as:  TUMS  Take 1 tablet (500 mg total) by mouth daily as needed.     Excedrin Extra Strength 250-250-65 mg per tablet  Generic drug:  aspirin-acetaminophen-caffeine 250-250-65 mg  Take 2 tablets by mouth daily as needed for Pain.     * HYDROcodone-acetaminophen 5-325 mg per tablet  Commonly known as:  NORCO  Take 1 tablet by mouth every 6 (six) hours as needed.     * HYDROcodone-acetaminophen  mg per tablet  Commonly known as:  NORCO  hydrocodone 10 mg-acetaminophen 325 mg tablet   TAKE 1 TABLET BY MOUTH 4 TIMES DAILY AS NEEDED FOR PAIN.     lisinopril-hydrochlorothiazide 20-12.5 mg per tablet  Commonly known as:  PRINZIDE,ZESTORETIC  Take 1 tablet by mouth once daily.     mirtazapine 15 MG tablet  Commonly known as:  Remeron  Take 1 tablet (15 mg total) by mouth nightly.     ondansetron 4 MG Tbdl  Commonly known as:  ZOFRAN-ODT  Take 1 tablet (4 mg total) by mouth every 8 (eight) hours as needed.     ondansetron 8 MG tablet  Commonly known as:  ZOFRAN  Take 8 mg by mouth every 8 (eight) hours.         * This list has 2 medication(s) that are the same as other medications prescribed  for you. Read the directions carefully, and ask your doctor or other care provider to review them with you.            ASK your doctor about these medications    amLODIPine 5 MG tablet  Commonly known as:  NORVASC  Take 1 tablet (5 mg total) by mouth once daily.     famotidine 20 MG tablet  Commonly known as:  PEPCID  Take 1 tablet (20 mg total) by mouth daily as needed (heart burn).     pantoprazole 40 MG tablet  Commonly known as:  PROTONIX  Take 40 mg by mouth once daily.     promethazine 25 MG suppository  Commonly known as:  PHENERGAN  Place 25 mg rectally every 6 (six) hours as needed for Nausea.     sucralfate 1 gram tablet  Commonly known as:  CARAFATE  Take 1 g by mouth 4 (four) times daily.            Theron Adam Jr, MD  General Surgery  [facility]

## 2020-03-03 NOTE — PLAN OF CARE
TN met with pt and sister in law Lizzie    Discharge rounds on patient. Discussed followup appointments, blue discharge folder, discharge nurse will go over home medications and reasons for medications and final discharge instructions. All patient/caregiver questions answered. Patient verbalized understanding.  pt has transportation to home     reviewed f/u apts with pt:      Follow-up With   Details   Why   Contact Info   Hossein Mortensen MD   On 3/6/2020   11:30 am -- fax # [phone]    [address]  [facility]  [place] [address]  [phone]   Lamine Phan MD   On 3/9/2020   4:20 pm --- [address] [facility] [address] [place] [address] P [phone]    [address] [place] [address]  [phone]        03/03/20 1057   Final Note   Assessment Type Final Discharge Note   Anticipated Discharge Disposition Home   What phone number can be called within the next 1-3 days to see how you are doing after discharge? [phone]   Hospital Follow Up  Appt(s) scheduled? Yes   Discharge plans and expectations educations in teach back method with documentation complete? Yes   Right Care Referral Info   Post Acute Recommendation SNF / Sub-Acute Rehab   Referral Type   (no care )

## 2020-03-03 NOTE — PROGRESS NOTES
[facility]  PROGRESS NOTE    HPI: Radha Alcazar is a 64 y.o. female with stage IV pancreatic cancer currently not seeking any palliative care who is admitted for suspected proximal small-bowel obstruction due to duodenal compression and restenoses.    INTERVAL HISTORY:  Unable to obtain upper GI series due to intolerance of contrast.  This was not due to nausea or vomiting.  GI evaluated and felt that the stent was not in a good position to be repositioned.  Patient had been tolerating clear liquids and no indication for a venting G tube at this time.    Has continue tolerate clears periods having loose bowel movements likely from the Gastrografin.  Denies nausea or vomiting.    VITALS:  Temp:  [97.2 °F (36.2 °C)-98.5 °F (36.9 °C)] 97.7 °F (36.5 °C)  Pulse:  [] 92  Resp:  [17-18] 18  SpO2:  [99 %] 99 %  BP: (118-157)/(57-99) 150/81    I&Os:  I/O last 3 completed shifts:  In: 1945.8 [P.O.:300; I.V.:1495.8; NG/GT:150]  Out: 1030 [Urine:400; Drains:630]    PHYSICAL EXAM:  GEN:  No acute distress, alert orient x3  HEENT:  Anicteric sclera  CV:  Regular rate rhythm  RESP:  Nonlabored breathing  ABD:  Soft, nondistended, nontender  EXT:  No significant edema    LABS:  CBC:   Recent Labs   Lab 03/03/20  0553   WBC 7.97   RBC 3.11*   HGB 9.8*   HCT 29.5*   *   MCV 95   MCH 31.5*   MCHC 33.2     BMP:   Recent Labs   Lab 03/03/20  0553      *   K 3.1*      CO2 24   BUN 4*   CREATININE 0.6   CALCIUM 7.6*     Labs within the past 24 hours have been reviewed.      ASSESSMENT & PLAN:  64 y.o. female     Stage IV pancreatic cancer with duodenal   - clear liquids and soft foods or passing without any significant abdominal pain, nausea or vomiting  - duodenal stent is unlikely to be able to be repositioned replaced  - will continue diet as tolerated in plan to discharge patient home  - if she continues to have issues with nausea, vomiting bloating and abdominal pain we will consider  her for venting G-tube or a G-J tube  - patient has contact with [facility]    Hypertension  -  continue home dose of lisinopril HCTZ and Norvasc     PPX  - SCDs, will also start Lovenox  - PPI    Disposition  - discharge home today

## 2020-03-03 NOTE — PLAN OF CARE
Patient has not had any nauses/vomiting since NG tube removal.  Patient is on clear liquid diet, tolerating well.  Patient has been belching and having bowel movements throughout the day.  Stable at this time.

## 2020-03-03 NOTE — PROGRESS NOTES
Follow-up With  Details  Why  Contact Info   Hossein Mortensen MD  On 3/6/2020  11:30 am -- fax # [phone]   [address]  [facility]  [place] [address]  [phone]   Lamine Phan MD  On 3/9/2020  4:20 pm --- [address] [place] [address] [address] [place] [address] P [phone]   [address]  [facility] [address]  [phone]

## 2020-03-06 ENCOUNTER — TELEPHONE (OUTPATIENT)
Dept: HEMATOLOGY/ONCOLOGY | Facility: CLINIC | Age: 65
End: 2020-03-06

## 2020-03-06 ENCOUNTER — HOSPITAL ENCOUNTER (INPATIENT)
Facility: HOSPITAL | Age: 65
LOS: 2 days | Discharge: HOME | DRG: 065 | End: 2020-03-08
Attending: HOSPITALIST | Admitting: HOSPITALIST
Payer: MEDICARE

## 2020-03-06 ENCOUNTER — HOSPITAL ENCOUNTER (OUTPATIENT)
Dept: TELEMEDICINE | Facility: HOSPITAL | Age: 65
Discharge: HOME | End: 2020-03-06

## 2020-03-06 DIAGNOSIS — I63.9 THROMBOEMBOLIC STROKE: ICD-10-CM

## 2020-03-06 DIAGNOSIS — I63.9 STROKE: ICD-10-CM

## 2020-03-06 DIAGNOSIS — G45.9 TIA (TRANSIENT ISCHEMIC ATTACK): ICD-10-CM

## 2020-03-06 PROBLEM — I10 ESSENTIAL HYPERTENSION: Chronic | Status: ACTIVE | Noted: 2020-01-17

## 2020-03-06 PROBLEM — J90 PLEURAL EFFUSION, RIGHT: Chronic | Status: ACTIVE | Noted: 2020-01-18

## 2020-03-06 PROBLEM — R11.2 INTRACTABLE NAUSEA AND VOMITING: Status: RESOLVED | Noted: 2020-01-17 | Resolved: 2020-03-06

## 2020-03-06 PROBLEM — D63.0 ANEMIA IN NEOPLASTIC DISEASE: Chronic | Status: ACTIVE | Noted: 2020-01-18

## 2020-03-06 PROBLEM — C25.9 PANCREATIC ADENOCARCINOMA: Chronic | Status: ACTIVE | Noted: 2020-02-04

## 2020-03-06 PROBLEM — C25.9 PANCREATIC CARCINOMA METASTATIC TO LIVER: Chronic | Status: ACTIVE | Noted: 2020-01-18

## 2020-03-06 PROBLEM — D72.829 LEUKOCYTOSIS: Status: RESOLVED | Noted: 2020-01-18 | Resolved: 2020-03-06

## 2020-03-06 PROBLEM — C78.7 PANCREATIC CARCINOMA METASTATIC TO LIVER: Chronic | Status: ACTIVE | Noted: 2020-01-18

## 2020-03-06 LAB
CHOLEST SERPL-MCNC: 140 MG/DL (ref 120–199)
CHOLEST/HDLC SERPL: 3.3 {RATIO} (ref 2–5)
ESTIMATED AVG GLUCOSE: 108 MG/DL (ref 68–131)
HBA1C MFR BLD HPLC: 5.4 % (ref 4–5.6)
HDLC SERPL-MCNC: 43 MG/DL (ref 40–75)
HDLC SERPL: 30.7 % (ref 20–50)
LDLC SERPL CALC-MCNC: 76 MG/DL (ref 63–159)
NONHDLC SERPL-MCNC: 97 MG/DL
T4 FREE SERPL-MCNC: 0.91 NG/DL (ref 0.71–1.51)
TRIGL SERPL-MCNC: 105 MG/DL (ref 30–150)
TSH SERPL DL<=0.005 MIU/L-ACNC: 12.61 UIU/ML (ref 0.4–4)

## 2020-03-06 PROCEDURE — 80061 LIPID PANEL: CPT

## 2020-03-06 PROCEDURE — 11000001 HC ACUTE MED/SURG PRIVATE ROOM

## 2020-03-06 PROCEDURE — 36415 COLL VENOUS BLD VENIPUNCTURE: CPT

## 2020-03-06 PROCEDURE — 25000003 PHARM REV CODE 250: Performed by: NURSE PRACTITIONER

## 2020-03-06 PROCEDURE — 84443 ASSAY THYROID STIM HORMONE: CPT

## 2020-03-06 PROCEDURE — 25000003 PHARM REV CODE 250: Performed by: HOSPITALIST

## 2020-03-06 PROCEDURE — G0426 INPT/ED TELECONSULT50: HCPCS | Mod: GT,G0,, | Performed by: PSYCHIATRY & NEUROLOGY

## 2020-03-06 PROCEDURE — S5010 5% DEXTROSE AND 0.45% SALINE: HCPCS | Performed by: HOSPITALIST

## 2020-03-06 PROCEDURE — 63600175 PHARM REV CODE 636 W HCPCS: Performed by: NURSE PRACTITIONER

## 2020-03-06 PROCEDURE — 83036 HEMOGLOBIN GLYCOSYLATED A1C: CPT

## 2020-03-06 PROCEDURE — G0426 PR INPT TELEHEALTH CONSULT 50M: ICD-10-PCS | Mod: GT,G0,, | Performed by: PSYCHIATRY & NEUROLOGY

## 2020-03-06 PROCEDURE — 84439 ASSAY OF FREE THYROXINE: CPT

## 2020-03-06 RX ORDER — ENOXAPARIN SODIUM 100 MG/ML
30 INJECTION SUBCUTANEOUS EVERY 24 HOURS
Status: DISCONTINUED | OUTPATIENT
Start: 2020-03-06 | End: 2020-03-06

## 2020-03-06 RX ORDER — FAMOTIDINE 20 MG/1
20 TABLET, FILM COATED ORAL DAILY PRN
Status: DISCONTINUED | OUTPATIENT
Start: 2020-03-06 | End: 2020-03-08 | Stop reason: HOSPADM

## 2020-03-06 RX ORDER — LISINOPRIL 20 MG/1
20 TABLET ORAL DAILY
Status: DISCONTINUED | OUTPATIENT
Start: 2020-03-07 | End: 2020-03-08 | Stop reason: HOSPADM

## 2020-03-06 RX ORDER — ONDANSETRON 2 MG/ML
4 INJECTION INTRAMUSCULAR; INTRAVENOUS EVERY 8 HOURS PRN
Status: DISCONTINUED | OUTPATIENT
Start: 2020-03-06 | End: 2020-03-08 | Stop reason: HOSPADM

## 2020-03-06 RX ORDER — HYDROCHLOROTHIAZIDE 12.5 MG/1
12.5 TABLET ORAL DAILY
Status: DISCONTINUED | OUTPATIENT
Start: 2020-03-07 | End: 2020-03-08 | Stop reason: HOSPADM

## 2020-03-06 RX ORDER — CALCIUM CARBONATE 200(500)MG
500 TABLET,CHEWABLE ORAL 2 TIMES DAILY PRN
Status: DISCONTINUED | OUTPATIENT
Start: 2020-03-06 | End: 2020-03-08 | Stop reason: HOSPADM

## 2020-03-06 RX ORDER — HYDROCODONE BITARTRATE AND ACETAMINOPHEN 5; 325 MG/1; MG/1
1 TABLET ORAL EVERY 6 HOURS PRN
Status: DISCONTINUED | OUTPATIENT
Start: 2020-03-06 | End: 2020-03-08 | Stop reason: HOSPADM

## 2020-03-06 RX ORDER — ASPIRIN 81 MG/1
81 TABLET ORAL DAILY
Status: DISCONTINUED | OUTPATIENT
Start: 2020-03-07 | End: 2020-03-08 | Stop reason: HOSPADM

## 2020-03-06 RX ORDER — ACETAMINOPHEN 325 MG/1
325 TABLET ORAL EVERY 6 HOURS PRN
Status: DISCONTINUED | OUTPATIENT
Start: 2020-03-06 | End: 2020-03-08 | Stop reason: HOSPADM

## 2020-03-06 RX ORDER — DEXTROSE MONOHYDRATE AND SODIUM CHLORIDE 5; .45 G/100ML; G/100ML
INJECTION, SOLUTION INTRAVENOUS CONTINUOUS
Status: ACTIVE | OUTPATIENT
Start: 2020-03-06 | End: 2020-03-07

## 2020-03-06 RX ORDER — MIRTAZAPINE 15 MG/1
15 TABLET, FILM COATED ORAL NIGHTLY
Status: DISCONTINUED | OUTPATIENT
Start: 2020-03-06 | End: 2020-03-08 | Stop reason: HOSPADM

## 2020-03-06 RX ORDER — ATORVASTATIN CALCIUM 40 MG/1
40 TABLET, FILM COATED ORAL DAILY
Status: DISCONTINUED | OUTPATIENT
Start: 2020-03-07 | End: 2020-03-07

## 2020-03-06 RX ORDER — ENOXAPARIN SODIUM 100 MG/ML
40 INJECTION SUBCUTANEOUS EVERY 24 HOURS
Status: DISCONTINUED | OUTPATIENT
Start: 2020-03-06 | End: 2020-03-08 | Stop reason: HOSPADM

## 2020-03-06 RX ADMIN — ENOXAPARIN SODIUM 40 MG: 100 INJECTION SUBCUTANEOUS at 10:03

## 2020-03-06 RX ADMIN — DEXTROSE AND SODIUM CHLORIDE 1000 ML: 5; .45 INJECTION, SOLUTION INTRAVENOUS at 10:03

## 2020-03-06 RX ADMIN — MIRTAZAPINE 15 MG: 15 TABLET, FILM COATED ORAL at 10:03

## 2020-03-07 PROBLEM — E03.8 SUBCLINICAL HYPOTHYROIDISM: Status: ACTIVE | Noted: 2020-03-07

## 2020-03-07 LAB
ALBUMIN SERPL BCP-MCNC: 2.1 G/DL (ref 3.5–5.2)
ALP SERPL-CCNC: 338 U/L (ref 55–135)
ALT SERPL W/O P-5'-P-CCNC: 22 U/L (ref 10–44)
ANION GAP SERPL CALC-SCNC: 12 MMOL/L (ref 8–16)
AORTIC ROOT ANNULUS: 3.15 CM
APTT BLDCRRT: 30 SEC (ref 21–32)
AST SERPL-CCNC: 39 U/L (ref 10–40)
AV INDEX (PROSTH): 0.77
AV MEAN GRADIENT: 4 MMHG
AV PEAK GRADIENT: 8 MMHG
AV VALVE AREA: 2.33 CM2
AV VELOCITY RATIO: 0.74
BACTERIA #/AREA URNS HPF: NORMAL /HPF
BASOPHILS # BLD AUTO: 0.06 K/UL (ref 0–0.2)
BASOPHILS NFR BLD: 0.4 % (ref 0–1.9)
BILIRUB SERPL-MCNC: 0.9 MG/DL (ref 0.1–1)
BILIRUB UR QL STRIP: ABNORMAL
BSA FOR ECHO PROCEDURE: 1.86 M2
BUN SERPL-MCNC: 12 MG/DL (ref 8–23)
CALCIUM SERPL-MCNC: 8.4 MG/DL (ref 8.7–10.5)
CHLORIDE SERPL-SCNC: 95 MMOL/L (ref 95–110)
CLARITY UR: CLEAR
CO2 SERPL-SCNC: 28 MMOL/L (ref 23–29)
COLOR UR: ABNORMAL
CREAT SERPL-MCNC: 0.7 MG/DL (ref 0.5–1.4)
CV ECHO LV RWT: 0.51 CM
DIFFERENTIAL METHOD: ABNORMAL
DOP CALC AO PEAK VEL: 1.41 M/S
DOP CALC AO VTI: 23.91 CM
DOP CALC LVOT AREA: 3 CM2
DOP CALC LVOT DIAMETER: 1.96 CM
DOP CALC LVOT PEAK VEL: 1.05 M/S
DOP CALC LVOT STROKE VOLUME: 55.76 CM3
DOP CALCLVOT PEAK VEL VTI: 18.49 CM
E WAVE DECELERATION TIME: 174.89 MSEC
E/A RATIO: 0.74
E/E' RATIO: 7 M/S
ECHO LV POSTERIOR WALL: 0.9 CM (ref 0.6–1.1)
EOSINOPHIL # BLD AUTO: 0.1 K/UL (ref 0–0.5)
EOSINOPHIL NFR BLD: 0.5 % (ref 0–8)
ERYTHROCYTE [DISTWIDTH] IN BLOOD BY AUTOMATED COUNT: 16.9 % (ref 11.5–14.5)
EST. GFR  (AFRICAN AMERICAN): >60 ML/MIN/1.73 M^2
EST. GFR  (NON AFRICAN AMERICAN): >60 ML/MIN/1.73 M^2
FOLATE SERPL-MCNC: 10.2 NG/ML (ref 4–24)
FRACTIONAL SHORTENING: 26 % (ref 28–44)
GLUCOSE SERPL-MCNC: 127 MG/DL (ref 70–110)
GLUCOSE UR QL STRIP: NEGATIVE
HCT VFR BLD AUTO: 32.8 % (ref 37–48.5)
HGB BLD-MCNC: 10.9 G/DL (ref 12–16)
HGB UR QL STRIP: NEGATIVE
HYALINE CASTS #/AREA URNS LPF: 1 /LPF
IMM GRANULOCYTES # BLD AUTO: 0.06 K/UL (ref 0–0.04)
IMM GRANULOCYTES NFR BLD AUTO: 0.4 % (ref 0–0.5)
INR PPP: 1.1 (ref 0.8–1.2)
INTERVENTRICULAR SEPTUM: 1.15 CM (ref 0.6–1.1)
KETONES UR QL STRIP: ABNORMAL
LA MAJOR: 3.46 CM
LA MINOR: 3.61 CM
LA WIDTH: 3.3 CM
LEFT ATRIUM SIZE: 2.46 CM
LEFT ATRIUM VOLUME INDEX: 13 ML/M2
LEFT ATRIUM VOLUME: 24.38 CM3
LEFT INTERNAL DIMENSION IN SYSTOLE: 2.62 CM (ref 2.1–4)
LEFT VENTRICLE DIASTOLIC VOLUME INDEX: 28.3 ML/M2
LEFT VENTRICLE DIASTOLIC VOLUME: 53.06 ML
LEFT VENTRICLE MASS INDEX: 59 G/M2
LEFT VENTRICLE SYSTOLIC VOLUME INDEX: 13.4 ML/M2
LEFT VENTRICLE SYSTOLIC VOLUME: 25.18 ML
LEFT VENTRICULAR INTERNAL DIMENSION IN DIASTOLE: 3.56 CM (ref 3.5–6)
LEFT VENTRICULAR MASS: 109.96 G
LEUKOCYTE ESTERASE UR QL STRIP: ABNORMAL
LV LATERAL E/E' RATIO: 7 M/S
LV SEPTAL E/E' RATIO: 7 M/S
LYMPHOCYTES # BLD AUTO: 1.2 K/UL (ref 1–4.8)
LYMPHOCYTES NFR BLD: 7.3 % (ref 18–48)
MAGNESIUM SERPL-MCNC: 1.7 MG/DL (ref 1.6–2.6)
MCH RBC QN AUTO: 31.2 PG (ref 27–31)
MCHC RBC AUTO-ENTMCNC: 33.2 G/DL (ref 32–36)
MCV RBC AUTO: 94 FL (ref 82–98)
MICROSCOPIC COMMENT: NORMAL
MONOCYTES # BLD AUTO: 1.6 K/UL (ref 0.3–1)
MONOCYTES NFR BLD: 9.4 % (ref 4–15)
MV PEAK A VEL: 0.66 M/S
MV PEAK E VEL: 0.49 M/S
NEUTROPHILS # BLD AUTO: 13.7 K/UL (ref 1.8–7.7)
NEUTROPHILS NFR BLD: 82 % (ref 38–73)
NITRITE UR QL STRIP: NEGATIVE
NRBC BLD-RTO: 0 /100 WBC
PH UR STRIP: 6 [PH] (ref 5–8)
PHOSPHATE SERPL-MCNC: 3.6 MG/DL (ref 2.7–4.5)
PISA TR MAX VEL: 2.36 M/S
PLATELET # BLD AUTO: 389 K/UL (ref 150–350)
PMV BLD AUTO: 9.2 FL (ref 9.2–12.9)
POTASSIUM SERPL-SCNC: 3.2 MMOL/L (ref 3.5–5.1)
PROT SERPL-MCNC: 5.9 G/DL (ref 6–8.4)
PROT UR QL STRIP: ABNORMAL
PROTHROMBIN TIME: 11.3 SEC (ref 9–12.5)
PV PEAK VELOCITY: 1.02 CM/S
RA MAJOR: 2.78 CM
RA PRESSURE: 3 MMHG
RA WIDTH: 2.5 CM
RBC # BLD AUTO: 3.49 M/UL (ref 4–5.4)
RBC #/AREA URNS HPF: 1 /HPF (ref 0–4)
SODIUM SERPL-SCNC: 135 MMOL/L (ref 136–145)
SP GR UR STRIP: >=1.03 (ref 1–1.03)
SQUAMOUS #/AREA URNS HPF: 4 /HPF
TDI LATERAL: 0.07 M/S
TDI SEPTAL: 0.07 M/S
TDI: 0.07 M/S
TR MAX PG: 22 MMHG
TRICUSPID ANNULAR PLANE SYSTOLIC EXCURSION: 1.93 CM
TV REST PULMONARY ARTERY PRESSURE: 25 MMHG
URN SPEC COLLECT METH UR: ABNORMAL
UROBILINOGEN UR STRIP-ACNC: 1 EU/DL
VIT B12 SERPL-MCNC: 1199 PG/ML (ref 210–950)
WBC # BLD AUTO: 16.66 K/UL (ref 3.9–12.7)
WBC #/AREA URNS HPF: 5 /HPF (ref 0–5)

## 2020-03-07 PROCEDURE — 81000 URINALYSIS NONAUTO W/SCOPE: CPT

## 2020-03-07 PROCEDURE — 80053 COMPREHEN METABOLIC PANEL: CPT

## 2020-03-07 PROCEDURE — 85730 THROMBOPLASTIN TIME PARTIAL: CPT

## 2020-03-07 PROCEDURE — 25000003 PHARM REV CODE 250: Performed by: NURSE PRACTITIONER

## 2020-03-07 PROCEDURE — 11000001 HC ACUTE MED/SURG PRIVATE ROOM

## 2020-03-07 PROCEDURE — 84100 ASSAY OF PHOSPHORUS: CPT

## 2020-03-07 PROCEDURE — 85025 COMPLETE CBC W/AUTO DIFF WBC: CPT

## 2020-03-07 PROCEDURE — 63600175 PHARM REV CODE 636 W HCPCS: Performed by: NURSE PRACTITIONER

## 2020-03-07 PROCEDURE — 94761 N-INVAS EAR/PLS OXIMETRY MLT: CPT

## 2020-03-07 PROCEDURE — 85610 PROTHROMBIN TIME: CPT

## 2020-03-07 PROCEDURE — 82746 ASSAY OF FOLIC ACID SERUM: CPT

## 2020-03-07 PROCEDURE — 36415 COLL VENOUS BLD VENIPUNCTURE: CPT

## 2020-03-07 PROCEDURE — 83735 ASSAY OF MAGNESIUM: CPT

## 2020-03-07 PROCEDURE — 82607 VITAMIN B-12: CPT

## 2020-03-07 RX ADMIN — MIRTAZAPINE 15 MG: 15 TABLET, FILM COATED ORAL at 08:03

## 2020-03-07 RX ADMIN — LISINOPRIL 20 MG: 20 TABLET ORAL at 09:03

## 2020-03-07 RX ADMIN — ATORVASTATIN CALCIUM 40 MG: 40 TABLET, FILM COATED ORAL at 09:03

## 2020-03-07 RX ADMIN — ASPIRIN 81 MG: 81 TABLET, COATED ORAL at 09:03

## 2020-03-07 RX ADMIN — ENOXAPARIN SODIUM 40 MG: 100 INJECTION SUBCUTANEOUS at 11:03

## 2020-03-07 RX ADMIN — HYDROCODONE BITARTRATE AND ACETAMINOPHEN 1 TABLET: 5; 325 TABLET ORAL at 02:03

## 2020-03-07 RX ADMIN — ONDANSETRON 4 MG: 2 INJECTION INTRAMUSCULAR; INTRAVENOUS at 05:03

## 2020-03-07 RX ADMIN — HYDROCODONE BITARTRATE AND ACETAMINOPHEN 1 TABLET: 5; 325 TABLET ORAL at 09:03

## 2020-03-07 RX ADMIN — HYDROCHLOROTHIAZIDE 12.5 MG: 12.5 TABLET ORAL at 09:03

## 2020-03-07 RX ADMIN — PROMETHAZINE HYDROCHLORIDE 6.25 MG: 25 INJECTION INTRAMUSCULAR; INTRAVENOUS at 10:03

## 2020-03-07 RX ADMIN — PROMETHAZINE HYDROCHLORIDE 6.25 MG: 25 INJECTION INTRAMUSCULAR; INTRAVENOUS at 06:03

## 2020-03-07 RX ADMIN — ONDANSETRON 4 MG: 2 INJECTION INTRAMUSCULAR; INTRAVENOUS at 01:03

## 2020-03-07 RX ADMIN — HYDROCODONE BITARTRATE AND ACETAMINOPHEN 1 TABLET: 5; 325 TABLET ORAL at 08:03

## 2020-03-07 NOTE — CONSULTS
"NEUROLOGY FLOOR CONSULT    Reason for consult:  Transient neuro deficits    Informant:  patient       Other sources of information : past medical records    CC:  "weakness"    HPI:   Radha Alcazar is a 64 y.o. year old r handed F w PMH of stage 4 pancreatic cancer w mets to the liver and carcinomatosis presents after transient episode of R arm weakness and numbness that started yesterday while in the store and resolved in the ER at [facility]. States whole thing lasted roughly about 15 minutes after she noticed the weakness however she is not sure when it started). No intervention. Reportedly CTH unremarkable. She is not sure if the weakness is 100% resolved, she states she may have some residual weakness. Denies leg weakness, inability to walk, double vision. Reports nausea and vomiting X1 however states this has been going on on and off since the diagnosis of cancer in January. She is not on any treatment for the cancer.     ROS: as above    Histories:     Allergies:  Patient has no known allergies.    Current Medications:    Current Facility-Administered Medications   Medication Dose Route Frequency Provider Last Rate Last Dose    acetaminophen tablet 325 mg  325 mg Oral Q6H PRN Myles Christian DO        aspirin EC tablet 81 mg  81 mg Oral Daily Liset Conway NP   81 mg at 03/07/20 0925    atorvastatin tablet 40 mg  40 mg Oral Daily Liset Conway NP   40 mg at 03/07/20 0925    calcium carbonate 200 mg calcium (500 mg) chewable tablet 500 mg  500 mg Oral BID PRN Liset Conway NP        enoxaparin injection 40 mg  40 mg Subcutaneous Daily Liset Conway NP   40 mg at 03/06/20 2216    famotidine tablet 20 mg  20 mg Oral Daily PRN Liset Conway NP        hydroCHLOROthiazide tablet 12.5 mg  12.5 mg Oral Daily Liset Conway NP   12.5 mg at 03/07/20 0924    HYDROcodone-acetaminophen 5-325 mg per tablet 1 tablet  1 tablet Oral Q6H PRN Liset Conway NP   1 tablet at " 03/07/20 0925    lisinopril tablet 20 mg  20 mg Oral Daily Liset Conway NP   20 mg at 03/07/20 0924    mirtazapine tablet 15 mg  15 mg Oral Nightly Liset Conway NP   15 mg at 03/06/20 2216    ondansetron injection 4 mg  4 mg Intravenous Q8H PRN Liset Conway NP   4 mg at 03/07/20 0144    promethazine (PHENERGAN) 6.25 mg in dextrose 5 % 50 mL IVPB  6.25 mg Intravenous Q6H PRN Liset Conway  mL/hr at 03/07/20 0652 6.25 mg at 03/07/20 0652       Past Medical/Surgical/Family History:  Medical:   Past Medical History:   Diagnosis Date    Hypertension     Stage IV adenocarcinoma of pancreas     Stroke 3/6/2020    Subclinical hypothyroidism 3/7/2020      Surgeries:   Past Surgical History:   Procedure Laterality Date    APPENDECTOMY      duodenal stent      ENDOSCOPIC ULTRASOUND OF UPPER GASTROINTESTINAL TRACT N/A 1/20/2020    Procedure: ULTRASOUND, UPPER GI TRACT, ENDOSCOPIC;  Surgeon: Toni Messer MD;  Location: [facility] ([address] [place] [address]);  Service: Endoscopy;  Laterality: N/A;    ESOPHAGOGASTRODUODENOSCOPY N/A 1/22/2020    Procedure: EGD (ESOPHAGOGASTRODUODENOSCOPY);  Surgeon: Cory Servin MD;  Location: [facility] ([address] [place] [address]);  Service: Endoscopy;  Laterality: N/A;      Family:   Family History   Problem Relation Age of Onset    Colon cancer Mother     Breast cancer Mother     Prostate cancer Brother     Pancreatic cancer Maternal Uncle     Depression Daughter    , no family history of nerve or muscle disease    Social History:    Social History     Socioeconomic History    Marital status:      Spouse name: Not on file    Number of children: 3    Years of education: Not on file    Highest education level: Not on file   Occupational History    Not on file   Social Needs    Financial resource strain: Not on file    Food insecurity:     Worry: Not on file     Inability: Not on file    Transportation needs:     Medical: Not on file     Non-medical: Not on file    Tobacco Use    Smoking status: Never Smoker    Smokeless tobacco: Never Used   Substance and Sexual Activity    Alcohol use: Never     Frequency: Never    Drug use: Never    Sexual activity: Not Currently   Lifestyle    Physical activity:     Days per week: Not on file     Minutes per session: Not on file    Stress: Not on file   Relationships    Social connections:     Talks on phone: Not on file     Gets together: Not on file     Attends Confucianist service: Not on file     Active member of club or organization: Not on file     Attends meetings of clubs or organizations: Not on file     Relationship status: Not on file   Other Topics Concern    Patient feels they ought to cut down on drinking/drug use Not Asked    Patient annoyed by others criticizing their drinking/drug use Not Asked    Patient has felt bad or guilty about drinking/drug use Not Asked    Patient has had a drink/used drugs as an eye opener in the AM Not Asked   Social History Narrative     ( in nursing home with dementia since 12/2019)    3 adult children (Talita- lives in [facility]); 6 grandchildren           Current Evaluation:     Vital Signs:   Vitals:    03/07/20 0705   BP:    Pulse: 90   Resp:    Temp:         Neurological Exam   Mental Status:  Alert and oriented to self, place, and time.     Language:  No aphasia, no dysarthria.     Cranial Nerves:  Visual fields were intact to confrontation, no RAPD, no anisocoria, EOM intact bilaterally, V1-V3 with good sensation to light touch, no facial asymmetry, hearing grossly intact, palate, and tongue midline. Good shoulder rug.     Motor:  Strength: 5/5 in all 4 extremities through out.   Tone: Good muscle tone.    No pronator drift    DTRs:  Symmetric and 2+ through out.     Sensation:  Intact to light touch through out.  Vibration and proprioception intact    Cerebellar:  Negative Romberg   Good finger to nose.  Good heal to shin  Rapid  alternating movement without any problems.    Gait and Stand:  Normal gait and stand      LABORATORY STUDIES:  Recent Results (from the past 24 hour(s))   Lipid panel    Collection Time: 03/06/20  9:51 PM   Result Value Ref Range    Cholesterol 140 120 - 199 mg/dL    Triglycerides 105 30 - 150 mg/dL    HDL 43 40 - 75 mg/dL    LDL Cholesterol 76.0 63.0 - 159.0 mg/dL    Hdl/Cholesterol Ratio 30.7 20.0 - 50.0 %    Total Cholesterol/HDL Ratio 3.3 2.0 - 5.0    Non-HDL Cholesterol 97 mg/dL   Hemoglobin A1c    Collection Time: 03/06/20  9:51 PM   Result Value Ref Range    Hemoglobin A1C 5.4 4.0 - 5.6 %    Estimated Avg Glucose 108 68 - 131 mg/dL   TSH    Collection Time: 03/06/20  9:51 PM   Result Value Ref Range    TSH 12.613 (H) 0.400 - 4.000 uIU/mL   T4, free    Collection Time: 03/06/20  9:51 PM   Result Value Ref Range    Free T4 0.91 0.71 - 1.51 ng/dL   Urinalysis, Reflex to Urine Culture Urine, Clean Catch    Collection Time: 03/07/20  2:37 AM   Result Value Ref Range    Specimen UA Urine, Clean Catch     Color, UA Orange (A) Yellow, Straw, Jacquelin    Appearance, UA Clear Clear    pH, UA 6.0 5.0 - 8.0    Specific Gravity, UA >=1.030 (A) 1.005 - 1.030    Protein, UA 1+ (A) Negative    Glucose, UA Negative Negative    Ketones, UA 1+ (A) Negative    Bilirubin (UA) 2+ (A) Negative    Occult Blood UA Negative Negative    Nitrite, UA Negative Negative    Urobilinogen, UA 1.0 <2.0 EU/dL    Leukocytes, UA Trace (A) Negative   Urinalysis Microscopic    Collection Time: 03/07/20  2:37 AM   Result Value Ref Range    RBC, UA 1 0 - 4 /hpf    WBC, UA 5 0 - 5 /hpf    Bacteria Occasional None-Occ /hpf    Squam Epithel, UA 4 /hpf    Hyaline Casts, UA 1 0-1/lpf /lpf    Microscopic Comment SEE COMMENT    Comprehensive metabolic panel    Collection Time: 03/07/20  4:38 AM   Result Value Ref Range    Sodium 135 (L) 136 - 145 mmol/L    Potassium 3.2 (L) 3.5 - 5.1 mmol/L    Chloride 95 95 - 110 mmol/L    CO2 28 23 - 29 mmol/L    Glucose 127  (H) 70 - 110 mg/dL    BUN, Bld 12 8 - 23 mg/dL    Creatinine 0.7 0.5 - 1.4 mg/dL    Calcium 8.4 (L) 8.7 - 10.5 mg/dL    Total Protein 5.9 (L) 6.0 - 8.4 g/dL    Albumin 2.1 (L) 3.5 - 5.2 g/dL    Total Bilirubin 0.9 0.1 - 1.0 mg/dL    Alkaline Phosphatase 338 (H) 55 - 135 U/L    AST 39 10 - 40 U/L    ALT 22 10 - 44 U/L    Anion Gap 12 8 - 16 mmol/L    eGFR if African American >60 >60 mL/min/1.73 m^2    eGFR if non African American >60 >60 mL/min/1.73 m^2   Magnesium    Collection Time: 03/07/20  4:38 AM   Result Value Ref Range    Magnesium 1.7 1.6 - 2.6 mg/dL   Phosphorus    Collection Time: 03/07/20  4:38 AM   Result Value Ref Range    Phosphorus 3.6 2.7 - 4.5 mg/dL   CBC auto differential    Collection Time: 03/07/20  4:38 AM   Result Value Ref Range    WBC 16.66 (H) 3.90 - 12.70 K/uL    RBC 3.49 (L) 4.00 - 5.40 M/uL    Hemoglobin 10.9 (L) 12.0 - 16.0 g/dL    Hematocrit 32.8 (L) 37.0 - 48.5 %    Mean Corpuscular Volume 94 82 - 98 fL    Mean Corpuscular Hemoglobin 31.2 (H) 27.0 - 31.0 pg    Mean Corpuscular Hemoglobin Conc 33.2 32.0 - 36.0 g/dL    RDW 16.9 (H) 11.5 - 14.5 %    Platelets 389 (H) 150 - 350 K/uL    MPV 9.2 9.2 - 12.9 fL    Immature Granulocytes 0.4 0.0 - 0.5 %    Gran # (ANC) 13.7 (H) 1.8 - 7.7 K/uL    Immature Grans (Abs) 0.06 (H) 0.00 - 0.04 K/uL    Lymph # 1.2 1.0 - 4.8 K/uL    Mono # 1.6 (H) 0.3 - 1.0 K/uL    Eos # 0.1 0.0 - 0.5 K/uL    Baso # 0.06 0.00 - 0.20 K/uL    nRBC 0 0 /100 WBC    Gran% 82.0 (H) 38.0 - 73.0 %    Lymph% 7.3 (L) 18.0 - 48.0 %    Mono% 9.4 4.0 - 15.0 %    Eosinophil% 0.5 0.0 - 8.0 %    Basophil% 0.4 0.0 - 1.9 %    Differential Method Automated    APTT    Collection Time: 03/07/20  4:38 AM   Result Value Ref Range    aPTT 30.0 21.0 - 32.0 sec   Protime-INR    Collection Time: 03/07/20  4:38 AM   Result Value Ref Range    Prothrombin Time 11.3 9.0 - 12.5 sec    INR 1.1 0.8 - 1.2   Echo Color Flow Doppler? Yes; Bubble Contrast? Yes    Collection Time: 03/07/20  8:50 AM   Result  Value Ref Range    AV mean gradient 4 mmHg    Ao peak finesse 1.41 m/s    Ao VTI 23.91 cm    IVS 1.15 (A) 0.6 - 1.1 cm    LA size 2.46 cm    Left Atrium Major Axis 3.46 cm    Left Atrium Minor Axis 3.61 cm    LVIDD 3.56 3.5 - 6.0 cm    LVIDS 2.62 2.1 - 4.0 cm    LVOT diameter 1.96 cm    LVOT peak VTI 18.49 cm    PW 0.90 0.6 - 1.1 cm    MV Peak A Finesse 0.66 m/s    E wave decelartion time 174.89 msec    MV Peak E Finesse 0.49 m/s    RA Major Axis 2.78 cm    RA Width 2.50 cm    TAPSE 1.93 cm    TR Max Finesse 2.36 m/s    TDI LATERAL 0.07 m/s    TDI SEPTAL 0.07 m/s    LA WIDTH 3.30 cm    Ao root annulus 3.15 cm    PV PEAK VELOCITY 1.02 cm/s    LV Diastolic Volume 53.06 mL    LV Systolic Volume 25.18 mL    LVOT peak finesse 1.05 m/s    LV LATERAL E/E' RATIO 7.00 m/s    LV SEPTAL E/E' RATIO 7.00 m/s    FS 26 %    LA volume 24.38 cm3    LV mass 109.96 g    Left Ventricle Relative Wall Thickness 0.51 cm    AV valve area 2.33 cm2    AV Velocity Ratio 0.74     AV index (prosthetic) 0.77     E/A ratio 0.74     Mean e' 0.07 m/s    LVOT area 3.0 cm2    LVOT stroke volume 55.76 cm3    AV peak gradient 8 mmHg    E/E' ratio 7.00 m/s    LV Systolic Volume Index 13.4 mL/m2    LV Diastolic Volume Index 28.30 mL/m2    LA Volume Index 13.0 mL/m2    LV Mass Index 59 g/m2    Triscuspid Valve Regurgitation Peak Gradient 22 mmHg    BSA 1.86 m2       Thyroid abnormal: 12.61  HgA1C%:  5.4  Vit B12: n/a  Folate:  n/a    RADIOLOGY STUDIES:  I have personally reviewed the images performed.     HEAD CT: not available    BRAIN MRI pending      Assessment:  P     65 yo F w PMH of metastatic pancreatic cancer presents with transient neurological deficits, currently back to baseline.    - Would obtain MRI brain with andwithout contrast   - MRA head and neck  - TTE w bubble  - npo until swallow eval  - PT/OT/ST  - asa, would not do statin given liver mets and LDL almost at goal  - hyperthyroidism management per primary  - please obtain B12 and folate  - Normotension,  notmoglycemia  - q4h neuro checks  - call neurology with questions  - Neurology will follow. Call with questions. Thank you for the consult.    Differential diagnosis was explained to the patient. All questions were answered. Patient understood and agreed to adhere to plan.       Case to be discussed with Dr. Mejia      Will follow for additional input regarding management of current neurologic condition and monitor for any new signs/symptoms to suggest neurologic detrimental changes.     Appreciate the consult.        Dallas Hook MD  [facility] PGY 3

## 2020-03-07 NOTE — ASSESSMENT & PLAN NOTE
Pancreatic carcinoma metastatic to liver  Anemia in neoplastic disease    Conservative management

## 2020-03-07 NOTE — HPI
"Radha Alcazar, 63 yo F, with hypertension, stage IV pancreatic cancer with liver mets, peritoneal carcinomatosis, history of partial small-bowel obstruction of the duodenum status post duodenal stent on 1/22/20 and depression presented to [facility] with 4/5 right-sided upper extremity weakness. Last known normal early am per patient. She reports while in the store with her daughter she noted she was unable to grasp her credit card when attempting to make a payment. She reports "my right arm kept falling and fingers wouldn't work." Exact time of onset unclear. Pt denies facial droop, aphasia and visual disturbance. No chest pain. She reports chronic SOB. Per patient symptoms resolved during ED evaluation.     CT head negative. Pt evaluated per vascular neurology- no indication for TPA, recommend transfer to [place] for neurology consult and CVA workup.         "

## 2020-03-07 NOTE — PLAN OF CARE
[facility] Plan of Care Note    Reviewed Mri brain. There is a scattered area of diffusion restricion in L motor cortex correlating with presentation. There are few T2 changes elsewhere which could represent chronic microvascular changes however given significant history of malignancy we think contrasted study is warranted.    - recommend MRI brain with and without contrast  - if no concern for mets after contrasted study is obtained recommend hem/onc consult to assess her for anticoagulation given she is in hypercoagulable state from malignancy  - if she does have mets to the brain recommend hem/onc consult to delineate further steps in management.  - Continue ASA for now.    Discussed with primary team. Please call with questions    Dallas Hook MD  [facility] PGY 3

## 2020-03-07 NOTE — PROGRESS NOTES
Pharmacist Renal Dose Adjustment Note    Radha Alcazar is a 64 y.o. female being treated with the medication Lovenox.    Patient Data:    Vital Signs (Most Recent):  Temp: 97.7 °F (36.5 °C) (03/06/20 2134)  Pulse: 89 (03/06/20 2134)  Resp: 16 (03/06/20 2134)  BP: 124/72 (03/06/20 2134)  SpO2: 96 % (03/06/20 2134) Vital Signs (72h Range):  Temp:  [97.7 °F (36.5 °C)-98.5 °F (36.9 °C)]   Pulse:  [89-97]   Resp:  [16-20]   BP: (124-157)/(72-86)   SpO2:  [96 %-99 %]      Recent Labs   Lab 03/01/20  0439 03/03/20  0553   CREATININE 0.6 0.6     Serum creatinine: 0.6 mg/dL 03/03/20 0553  Estimated creatinine clearance: 102.9 mL/min    Medication: Lovenox 30 mg SQ daily will be changed to Lovenox 40 mg SQ daily.    Pharmacist's Name: Suresh Lema  Pharmacist's Extension: [phone]

## 2020-03-07 NOTE — PROGRESS NOTES
"[facility] Medicine  Progress Note    Patient Name: Radha Alcazar  MRN: [id number]  Patient Class: IP- Inpatient   Admission Date: 3/6/2020  Length of Stay: 1 days  Attending Physician: Myles Christian DO  Primary Care Provider: Hossein Mortensen MD        Subjective:     Principal Problem:TIA (transient ischemic attack)        HPI:  Radha Alcazar, 63 yo F, with hypertension, stage IV pancreatic cancer with liver mets, peritoneal carcinomatosis, history of partial small-bowel obstruction of the duodenum status post duodenal stent on 1/22/20 and depression presented to [facility] with 4/5 right-sided upper extremity weakness. Last known normal early am per patient. She reports while in the store with her daughter she noted she was unable to grasp her credit card when attempting to make a payment. She reports "my right arm kept falling and fingers wouldn't work." Exact time of onset unclear. Pt denies facial droop, aphasia and visual disturbance. No chest pain. She reports chronic SOB. Per patient symptoms resolved during ED evaluation.     CT head negative. Pt evaluated per vascular neurology- no indication for TPA, recommend transfer to [place] for neurology consult and CVA workup.           Overview/Hospital Course:  Resolution of symptoms with no further episodes.  TTE with normal EF and negative bubble study. Holding statin due to HCC per Neuro recs.     Interval History: Resolution of weakness and numbness. No acute events overnight. TSH/Free T4 shows subclinical hypothyroid.  Normal TTE.     Review of Systems   Constitutional: Negative for chills, diaphoresis and fever.   HENT: Negative for sore throat and trouble swallowing.    Eyes: Negative for photophobia and visual disturbance.   Respiratory: Negative for cough, shortness of breath and wheezing.    Cardiovascular: Negative for chest pain and palpitations.   Gastrointestinal: Negative for abdominal pain, constipation, " diarrhea, nausea and vomiting.   Endocrine: Negative for polydipsia and polyphagia.   Genitourinary: Negative for decreased urine volume, dysuria, hematuria and urgency.   Musculoskeletal: Negative for joint swelling, neck pain and neck stiffness.   Neurological: Negative for weakness, numbness and headaches.   Psychiatric/Behavioral: Negative for agitation, dysphoric mood and suicidal ideas.     Objective:     Vital Signs (Most Recent):  Temp: 97.3 °F (36.3 °C) (03/07/20 0307)  Pulse: (!) 116 (03/07/20 1143)  Resp: 17 (03/07/20 0307)  BP: 134/73 (03/07/20 0307)  SpO2: 96 % (03/07/20 0454) Vital Signs (24h Range):  Temp:  [97.3 °F (36.3 °C)-98.5 °F (36.9 °C)] 97.3 °F (36.3 °C)  Pulse:  [] 116  Resp:  [16-20] 17  SpO2:  [96 %-99 %] 96 %  BP: (124-157)/(72-86) 134/73     Weight: 68.8 kg (151 lb 10.8 oz)  Body mass index is 21.15 kg/m².    Intake/Output Summary (Last 24 hours) at 3/7/2020 1306  Last data filed at 3/7/2020 1100  Gross per 24 hour   Intake --   Output 350 ml   Net -350 ml      Physical Exam   Constitutional: She is oriented to person, place, and time. No distress.   HENT:   Head: Normocephalic and atraumatic.   Eyes: Pupils are equal, round, and reactive to light. No scleral icterus.   Cardiovascular: Normal rate, regular rhythm, normal heart sounds and intact distal pulses.   No murmur heard.  Pulmonary/Chest: Effort normal and breath sounds normal. No respiratory distress. She has no wheezes. She has no rales.   Abdominal: Soft. Bowel sounds are normal. She exhibits no distension. There is no tenderness.   Neurological: She is alert and oriented to person, place, and time.   Skin: Skin is warm and dry. Capillary refill takes less than 2 seconds. She is not diaphoretic.   Psychiatric: She has a normal mood and affect.   Nursing note and vitals reviewed.      Significant Labs:   CBC:   Recent Labs   Lab 03/07/20  0438   WBC 16.66*   HGB 10.9*   HCT 32.8*   *     CMP:   Recent Labs   Lab  03/07/20  0438   *   K 3.2*   CL 95   CO2 28   *   BUN 12   CREATININE 0.7   CALCIUM 8.4*   PROT 5.9*   ALBUMIN 2.1*   BILITOT 0.9   ALKPHOS 338*   AST 39   ALT 22   ANIONGAP 12   EGFRNONAA >60     Coagulation:   Recent Labs   Lab 03/07/20  0438   INR 1.1   APTT 30.0     Lipid Panel:   Recent Labs   Lab 03/06/20  2151   CHOL 140   HDL 43   LDLCALC 76.0   TRIG 105   CHOLHDL 30.7     Magnesium:   Recent Labs   Lab 03/07/20  0438   MG 1.7     TSH:   Recent Labs   Lab 03/06/20  2151   TSH 12.613*     Urine Studies:   Recent Labs   Lab 03/07/20  0237   COLORU Hand*   APPEARANCEUA Clear   PHUR 6.0   SPECGRAV >=1.030*   PROTEINUA 1+*   GLUCUA Negative   KETONESU 1+*   BILIRUBINUA 2+*   OCCULTUA Negative   NITRITE Negative   UROBILINOGEN 1.0   LEUKOCYTESUR Trace*   RBCUA 1   WBCUA 5   BACTERIA Occasional   SQUAMEPITHEL 4   HYALINECASTS 1     All pertinent labs within the past 24 hours have been reviewed.    Significant Imaging: I have reviewed all pertinent imaging results/findings within the past 24 hours.       TTE (Echocardiogram with Bubble Study) 3/7/2020  · Normal left ventricular systolic function. The estimated ejection fraction is 55%.  · Concentric left ventricular remodeling.  · Grade I (mild) left ventricular diastolic dysfunction consistent with impaired relaxation.  · No wall motion abnormalities.  · Normal right ventricular systolic function.  · The estimated PA systolic pressure is 25 mmHg.  · Normal central venous pressure (3 mmHg).  · Negative bubble study. No Right to Left Intracardiac shunt is seen               Assessment/Plan:      * TIA (transient ischemic attack)  Resolution of symptoms  Appreciate Neurology  MRI/MRA, carotid US Pending  TTE with normal EF and negative Bubble Study  TChol 140. Holding Statin in setting of HCC  A1c 5.4  CHeck Folate and B12  Neuro checks q4h   ASA  Passed bedside swallow. OK for Diet  PT/OT/ST assessments      Stage IV adenocarcinoma of  pancreas  Pancreatic carcinoma metastatic to liver  Anemia in neoplastic disease    Conservative management       Anemia in neoplastic disease  Stable, Monitor      Pleural effusion, right  Stable       Essential hypertension  Continue lisinopril-HTZ 20-12.5 MG daily         VTE Risk Mitigation (From admission, onward)         Ordered     enoxaparin injection 40 mg  Daily      03/06/20 2157     IP VTE HIGH RISK PATIENT  Once      03/06/20 2140     Place sequential compression device  Until discontinued      03/06/20 2140                      Bridget Mohamud NP  Department of Hospital Medicine   [facility]

## 2020-03-07 NOTE — PLAN OF CARE
Outside Transfer Acceptance Note        Patients name/MRN:     Radha Alcazar MRN: [id number]     Referring Physician or Mid-Level provider giving report:   Cristobal Peralta MD     Referral Facility:    [facility] ED     Date/Time of Acceptance:    3/6/20 at 6:30pm     Accepting Physician for admission to hospital: Brian Salguero MD ()     Accepting facility:    \[facility]\""     Consulting Physicians from [facility] involved in case:   Gema Soto MD    Reason for transfer request:    Neurology    65yo woman with hypertension, depression, and stage IV pancreatic cancer with liver mets, peritoneal carcinomatosis, and history of partial small-bowel obstruction of the duodenum status post duodenal stent on 1/22/20 presented with 4/5 right-sided upper extremity weakness.  Also found to have a right-sided pleural effusion which has been present since CT in January.    CT head performed which was nonacute.  Tele stroke called; by this time her symptoms had improved back to baseline but Dr. Soto felt that she had had a TIA and required further workup.  No tPA administered.  We will request transfer for additional imaging.    To Do List upon arrival:    1) Consult Neurology  2) Order MRI brain, MRA head/neck, additional Neuro workup per telestroke note    Vital signs:   97.9° F, P 97, RR 20, /81, 99% on room air    Allergies:   NKDA    Current Facility-Administered Medications:   ASA, Zofran; statin    LABS:   WBC 17, hemoglobin 11.9, platelets 462, INR 1.14  Sodium 134, potassium 3.6, chloride 94, bicarb 25, glucose 128, BUN 9, creatinine 0.7, magnesium 1.6  EKG sinus tach    Imaging:   CT head: small vessel disease but nonacute    Brian Salguero MD  Department of Hospital Medicine  [facility]/   [phone]

## 2020-03-07 NOTE — PLAN OF CARE
VN cued into patients room for rounding - patient currently not available. No family at bedside. Progress notes and chart reviewed. Will continue to monitor and be available to intervene PRN.

## 2020-03-07 NOTE — ASSESSMENT & PLAN NOTE
Resolution of symptoms  Appreciate Neurology  MRI/MRA, carotid US Pending  TTE with normal EF and negative Bubble Study  TChol 140. Holding Statin in setting of HCC  A1c 5.4  CHeck Folate and B12  Neuro checks q4h   ASA  Passed bedside swallow. OK for Diet  PT/OT/ST assessments

## 2020-03-07 NOTE — HOSPITAL COURSE
Resolution of symptoms with no further episodes.  TTE with normal EF and negative bubble study. Holding statin due to HCC per Neuro recs. MRI shows multiple tiny thromboembolic infarcts in left frontoparietal lobes and right parietal lobe. MRA head and neck without any significant vascular abnormalities. After Dr. Christian and Hem/Onc spoke to patient, she has decided to transition to home hospice.  She has asked to be discharge today.  Aye, with [facility] spoke with the patient with the patient who is agreeable with discharge home and will meet with her tomorrow out-patient.  Per PT/OT, she is safe to go home with family support.  She is sent home with paper Rx for Clopidogrel to discuss whether or not initiate this preventive treatment with Hospice care.  She is physically stable for discharge.

## 2020-03-07 NOTE — PLAN OF CARE
Problem: Adult Inpatient Plan of Care  Goal: Plan of Care Review  Outcome: Ongoing, Progressing  Flowsheets (Taken 3/6/2020 2144)  Plan of Care Reviewed With: patient  Goal: Absence of Hospital-Acquired Illness or Injury  Outcome: Ongoing, Progressing  Intervention: Identify and Manage Fall Risk  Flowsheets (Taken 3/6/2020 2144)  Safety Promotion/Fall Prevention: assistive device/personal item within reach; diversional activities provided; Fall Risk reviewed with patient/family; Fall Risk signage in place; high risk medications identified; medications reviewed; nonskid shoes/socks when out of bed; side rails raised x 2; supervised activity; instructed to call staff for mobility  Intervention: Prevent VTE (venous thromboembolism)  Flowsheets (Taken 3/6/2020 2144)  VTE Prevention/Management: remove, assess skin and reapply sequential compression device; other (see comments) (lovenox)  Goal: Optimal Comfort and Wellbeing  Outcome: Ongoing, Progressing  Intervention: Provide Person-Centered Care  Flowsheets (Taken 3/6/2020 2144)  Trust Relationship/Rapport: questions answered; choices provided; care explained; questions encouraged; emotional support provided; reassurance provided; thoughts/feelings acknowledged; empathic listening provided     Problem: Functional Ability Impaired (Stroke, Ischemic/Transient Ischemic Attack)  Goal: Optimal Functional Ability  Outcome: Ongoing, Progressing  Intervention: Optimize Functional Ability  Flowsheets (Taken 3/6/2020 2144)  Self-Care Promotion: independence encouraged; BADL personal objects within reach; BADL personal routines maintained  Activity Management: ambulated to bathroom - L4     Problem: Pain (Stroke, Ischemic/Transient Ischemic Attack)  Goal: Acceptable Pain Control  Outcome: Ongoing, Progressing  Intervention: Monitor and Manage Pain  Flowsheets (Taken 3/6/2020 2144)  Pain Management Interventions: care clustered; diversional activity provided; pain management plan  reviewed with patient/caregiver     Problem: Fall Injury Risk  Goal: Absence of Fall and Fall-Related Injury  Outcome: Ongoing, Progressing  Intervention: Identify and Manage Contributors to Fall Injury Risk  Flowsheets (Taken 3/6/2020 2144)  Self-Care Promotion: independence encouraged; BADL personal objects within reach; BADL personal routines maintained  Medication Review/Management: medications reviewed; high risk medications identified  Intervention: Promote Injury-Free Environment  Flowsheets (Taken 3/6/2020 2144)  Safety Promotion/Fall Prevention: assistive device/personal item within reach; diversional activities provided; Fall Risk reviewed with patient/family; Fall Risk signage in place; high risk medications identified; medications reviewed; nonskid shoes/socks when out of bed; side rails raised x 2; supervised activity; instructed to call staff for mobility  Environmental Safety Modification: assistive device/personal items within reach; clutter free environment maintained; room organization consistent     Problem: Pain Chronic (Persistent) (Comorbidity Management)  Goal: Acceptable Pain Control and Functional Ability  Outcome: Ongoing, Progressing  Intervention: Develop Pain Management Plan  Flowsheets (Taken 3/6/2020 2144)  Pain Management Interventions: care clustered; diversional activity provided; pain management plan reviewed with patient/caregiver  Intervention: Manage Persistent Pain  Flowsheets (Taken 3/6/2020 2144)  Sleep/Rest Enhancement: awakenings minimized; consistent schedule promoted; family presence promoted; noise level reduced; regular sleep/rest pattern promoted  Bowel Elimination Promotion: adequate fluid intake promoted; ambulation promoted  Medication Review/Management: medications reviewed; high risk medications identified  Intervention: Optimize Psychosocial Wellbeing  Flowsheets (Taken 3/6/2020 2144)  Supportive Measures: active listening utilized; decision-making supported;  goal setting facilitated; self-responsibility promoted; problem solving facilitated; counseling provided  Diversional Activities: television     Cued into patient's room.  Permission received per patient to turn camera to view patient.  Introduced as VN for night shift that will be working with floor nurse and nursing assistant.  EVELYNE Ledesma at bedside preforming DELORES; patient passed.  Educated patient on VN's role in patient care. Plan of care reviewed with patient. Education per flowsheet.  Opportunity given for questions and questions answered.  Admission assessment questions answered.  No complaints.  Instructed to call for assistance.  Will cont to monitor.    Labs, notes, and orders reviewed.

## 2020-03-07 NOTE — H&P
"[facility] Medicine  History & Physical    Patient Name: Radha Alcazar  MRN: [id number]  Admission Date: 3/6/2020  Attending Physician: Myles Christian DO   Primary Care Provider: Hossein Mortensen MD         Patient information was obtained from patient, past medical records and ER records.     Subjective:     Principal Problem:TIA (transient ischemic attack)    Chief Complaint: No chief complaint on file.       HPI: Radha Alcazar, 63 yo F, with hypertension, stage IV pancreatic cancer with liver mets, peritoneal carcinomatosis, history of partial small-bowel obstruction of the duodenum status post duodenal stent on 1/22/20 and depression presented to [facility] with 4/5 right-sided upper extremity weakness. Last known normal early am per patient. She reports while in the store with her daughter she noted she was unable to grasp her credit card when attempting to make a payment. She reports "my right arm kept falling and fingers wouldn't work." Exact time of onset unclear. Pt denies facial droop, aphasia and visual disturbance. No chest pain. She reports chronic SOB. Per patient symptoms resolved during ED evaluation.     CT head negative. Pt evaluated per vascular neurology- no indication for TPA, recommend transfer to [place] for neurology consult and CVA workup.           Past Medical History:   Diagnosis Date    Hypertension     Stage IV adenocarcinoma of pancreas     Stroke 3/6/2020       Past Surgical History:   Procedure Laterality Date    APPENDECTOMY      duodenal stent      ENDOSCOPIC ULTRASOUND OF UPPER GASTROINTESTINAL TRACT N/A 1/20/2020    Procedure: ULTRASOUND, UPPER GI TRACT, ENDOSCOPIC;  Surgeon: Toni Messer MD;  Location: [facility] ([address] [place] [address]);  Service: Endoscopy;  Laterality: N/A;    ESOPHAGOGASTRODUODENOSCOPY N/A 1/22/2020    Procedure: EGD (ESOPHAGOGASTRODUODENOSCOPY);  Surgeon: Cory Servin MD;  Location: [facility] ([address] [place] [address]);  Service: Endoscopy;  " Laterality: N/A;       Review of patient's allergies indicates:  No Known Allergies    No current facility-administered medications on file prior to encounter.      Current Outpatient Medications on File Prior to Encounter   Medication Sig    calcium carbonate (TUMS) 200 mg calcium (500 mg) chewable tablet Take 1 tablet (500 mg total) by mouth daily as needed.    famotidine (PEPCID) 20 MG tablet Take 1 tablet (20 mg total) by mouth daily as needed (heart burn).    HYDROcodone-acetaminophen (NORCO)  mg per tablet TAKE 1 TABLET BY MOUTH 4 TIMES DAILY AS NEEDED FOR PAIN.    lisinopril-hydrochlorothiazide (PRINZIDE,ZESTORETIC) 20-12.5 mg per tablet Take 1 tablet by mouth once daily.    mirtazapine (REMERON) 15 MG tablet Take 1 tablet (15 mg total) by mouth nightly.    ondansetron (ZOFRAN) 8 MG tablet Take 8 mg by mouth every 8 (eight) hours as needed.     ondansetron (ZOFRAN-ODT) 4 MG TbDL Take 1 tablet (4 mg total) by mouth every 8 (eight) hours as needed.    zolpidem (AMBIEN) 5 MG Tab Take 1 tablet (5 mg total) by mouth nightly as needed.    amLODIPine (NORVASC) 5 MG tablet Take 1 tablet (5 mg total) by mouth once daily.    aspirin-acetaminophen-caffeine 250-250-65 mg (EXCEDRIN EXTRA STRENGTH) 250-250-65 mg per tablet Take 2 tablets by mouth daily as needed for Pain.    pantoprazole (PROTONIX) 40 MG tablet Take 40 mg by mouth once daily.    promethazine (PHENERGAN) 25 MG suppository Place 25 mg rectally every 6 (six) hours as needed for Nausea.    sucralfate (CARAFATE) 1 gram tablet Take 1 g by mouth 4 (four) times daily.     Family History     Problem Relation (Age of Onset)    Breast cancer Mother    Colon cancer Mother    Depression Daughter    Pancreatic cancer Maternal Uncle    Prostate cancer Brother        Tobacco Use    Smoking status: Never Smoker    Smokeless tobacco: Never Used   Substance and Sexual Activity    Alcohol use: Never     Frequency: Never    Drug use: Never    Sexual  activity: Not Currently     Review of Systems   Constitutional: Negative for chills, diaphoresis and fever.   Eyes: Negative for photophobia.   Respiratory: Negative for cough, chest tightness, shortness of breath and wheezing.    Cardiovascular: Negative for chest pain, palpitations and leg swelling.   Gastrointestinal: Positive for nausea. Negative for abdominal pain, diarrhea and vomiting.   Genitourinary: Negative for dysuria, flank pain, frequency and hematuria.   Musculoskeletal: Negative for back pain and myalgias.   Neurological: Positive for weakness (right upper extremity ). Negative for dizziness, syncope, light-headedness and headaches.   Psychiatric/Behavioral: Negative for confusion.     Objective:     Vital Signs (Most Recent):  Temp: 97.3 °F (36.3 °C) (03/07/20 0307)  Pulse: 82 (03/07/20 0400)  Resp: 17 (03/07/20 0307)  BP: 134/73 (03/07/20 0307)  SpO2: 96 % (03/07/20 0454) Vital Signs (24h Range):  Temp:  [97.3 °F (36.3 °C)-98.5 °F (36.9 °C)] 97.3 °F (36.3 °C)  Pulse:  [82-97] 82  Resp:  [16-20] 17  SpO2:  [96 %-99 %] 96 %  BP: (124-157)/(72-86) 134/73     Weight: 68.8 kg (151 lb 10.8 oz)  Body mass index is 21.15 kg/m².    Physical Exam   Constitutional: She is oriented to person, place, and time. She appears well-developed and well-nourished.   HENT:   Head: Normocephalic and atraumatic.   Eyes: Pupils are equal, round, and reactive to light. Conjunctivae are normal.   Neck: Normal range of motion. No JVD present.   Cardiovascular: Normal rate, regular rhythm, normal heart sounds and intact distal pulses.   Pulmonary/Chest: Effort normal. No respiratory distress. She has no wheezes.   Abdominal: Soft. Bowel sounds are normal. She exhibits no distension. There is no tenderness. There is no guarding.   Musculoskeletal: Normal range of motion. She exhibits no edema or tenderness.   Neurological: She is alert and oriented to person, place, and time. No cranial nerve deficit.   Skin: Skin is warm and  dry. Capillary refill takes less than 2 seconds.   Psychiatric: She has a normal mood and affect. Her behavior is normal.         CRANIAL NERVES     CN III, IV, VI   Pupils are equal, round, and reactive to light.       Significant Labs:   BMP:   Recent Labs   Lab 03/07/20  0438   *   *   K 3.2*   CL 95   CO2 28   BUN 12   CREATININE 0.7   CALCIUM 8.4*   MG 1.7     CBC:   Recent Labs   Lab 03/07/20 0438   WBC 16.66*   HGB 10.9*   HCT 32.8*   *     Urine Studies:   Recent Labs   Lab 03/07/20  0237   COLORU [place]*   APPEARANCEUA Clear   PHUR 6.0   SPECGRAV >=1.030*   PROTEINUA 1+*   GLUCUA Negative   KETONESU 1+*   BILIRUBINUA 2+*   OCCULTUA Negative   NITRITE Negative   UROBILINOGEN 1.0   LEUKOCYTESUR Trace*   RBCUA 1   WBCUA 5   BACTERIA Occasional   SQUAMEPITHEL 4   HYALINECASTS 1       Significant Imaging: I have reviewed all pertinent imaging results/findings within the past 24 hours.    Assessment/Plan:     * TIA (transient ischemic attack)  Neurology consult   Check MRI/MRA, carotid US   Neuro checks q4h   Check lipid panel, A1C  ASA/statin       Stage IV adenocarcinoma of pancreas  Pancreatic carcinoma metastatic to liver  Anemia in neoplastic disease    Conservative management       Pleural effusion, right  Stable       Essential hypertension  Continue lisinopril-HTZ 20-12.5 MG daily         VTE Risk Mitigation (From admission, onward)         Ordered     enoxaparin injection 40 mg  Daily      03/06/20 2157     IP VTE HIGH RISK PATIENT  Once      03/06/20 2140     Place sequential compression device  Until discontinued      03/06/20 2140                   Liset Conway NP  Department of Hospital Medicine   [facility]

## 2020-03-07 NOTE — SUBJECTIVE & OBJECTIVE
Interval History: Resolution of weakness and numbness. No acute events overnight. TSH/Free T4 shows subclinical hypothyroid.  Normal TTE.     Review of Systems   Constitutional: Negative for chills, diaphoresis and fever.   HENT: Negative for sore throat and trouble swallowing.    Eyes: Negative for photophobia and visual disturbance.   Respiratory: Negative for cough, shortness of breath and wheezing.    Cardiovascular: Negative for chest pain and palpitations.   Gastrointestinal: Negative for abdominal pain, constipation, diarrhea, nausea and vomiting.   Endocrine: Negative for polydipsia and polyphagia.   Genitourinary: Negative for decreased urine volume, dysuria, hematuria and urgency.   Musculoskeletal: Negative for joint swelling, neck pain and neck stiffness.   Neurological: Negative for weakness, numbness and headaches.   Psychiatric/Behavioral: Negative for agitation, dysphoric mood and suicidal ideas.     Objective:     Vital Signs (Most Recent):  Temp: 97.3 °F (36.3 °C) (03/07/20 0307)  Pulse: (!) 116 (03/07/20 1143)  Resp: 17 (03/07/20 0307)  BP: 134/73 (03/07/20 0307)  SpO2: 96 % (03/07/20 0454) Vital Signs (24h Range):  Temp:  [97.3 °F (36.3 °C)-98.5 °F (36.9 °C)] 97.3 °F (36.3 °C)  Pulse:  [] 116  Resp:  [16-20] 17  SpO2:  [96 %-99 %] 96 %  BP: (124-157)/(72-86) 134/73     Weight: 68.8 kg (151 lb 10.8 oz)  Body mass index is 21.15 kg/m².    Intake/Output Summary (Last 24 hours) at 3/7/2020 1306  Last data filed at 3/7/2020 1100  Gross per 24 hour   Intake --   Output 350 ml   Net -350 ml      Physical Exam   Constitutional: She is oriented to person, place, and time. No distress.   HENT:   Head: Normocephalic and atraumatic.   Eyes: Pupils are equal, round, and reactive to light. No scleral icterus.   Cardiovascular: Normal rate, regular rhythm, normal heart sounds and intact distal pulses.   No murmur heard.  Pulmonary/Chest: Effort normal and breath sounds normal. No respiratory distress. She  has no wheezes. She has no rales.   Abdominal: Soft. Bowel sounds are normal. She exhibits no distension. There is no tenderness.   Neurological: She is alert and oriented to person, place, and time.   Skin: Skin is warm and dry. Capillary refill takes less than 2 seconds. She is not diaphoretic.   Psychiatric: She has a normal mood and affect.   Nursing note and vitals reviewed.      Significant Labs:   CBC:   Recent Labs   Lab 03/07/20  0438   WBC 16.66*   HGB 10.9*   HCT 32.8*   *     CMP:   Recent Labs   Lab 03/07/20  0438   *   K 3.2*   CL 95   CO2 28   *   BUN 12   CREATININE 0.7   CALCIUM 8.4*   PROT 5.9*   ALBUMIN 2.1*   BILITOT 0.9   ALKPHOS 338*   AST 39   ALT 22   ANIONGAP 12   EGFRNONAA >60     Coagulation:   Recent Labs   Lab 03/07/20  0438   INR 1.1   APTT 30.0     Lipid Panel:   Recent Labs   Lab 03/06/20  2151   CHOL 140   HDL 43   LDLCALC 76.0   TRIG 105   CHOLHDL 30.7     Magnesium:   Recent Labs   Lab 03/07/20  0438   MG 1.7     TSH:   Recent Labs   Lab 03/06/20  2151   TSH 12.613*     Urine Studies:   Recent Labs   Lab 03/07/20  0237   COLORU [place]*   APPEARANCEUA Clear   PHUR 6.0   SPECGRAV >=1.030*   PROTEINUA 1+*   GLUCUA Negative   KETONESU 1+*   BILIRUBINUA 2+*   OCCULTUA Negative   NITRITE Negative   UROBILINOGEN 1.0   LEUKOCYTESUR Trace*   RBCUA 1   WBCUA 5   BACTERIA Occasional   SQUAMEPITHEL 4   HYALINECASTS 1     All pertinent labs within the past 24 hours have been reviewed.    Significant Imaging: I have reviewed all pertinent imaging results/findings within the past 24 hours.       TTE (Echocardiogram with Bubble Study) 3/7/2020  · Normal left ventricular systolic function. The estimated ejection fraction is 55%.  · Concentric left ventricular remodeling.  · Grade I (mild) left ventricular diastolic dysfunction consistent with impaired relaxation.  · No wall motion abnormalities.  · Normal right ventricular systolic function.  · The estimated PA systolic pressure  is 25 mmHg.  · Normal central venous pressure (3 mmHg).  · Negative bubble study. No Right to Left Intracardiac shunt is seen

## 2020-03-07 NOTE — PLAN OF CARE
[facility] Plan of Care Note    63 yo F w PMH of HTN, stage 3 metastatic pancreatic cancer presents as a transfer from OSH with complaint of transient weakness in upper extremity with unclear LKN. Symptoms resolved in few hours. CTH with no obvious abnormalities.    - Would obtain MRI brain with and without contrast  - MRA head and neck  - TTE w bubble  - npo until swallow eval  - PT/OT/ST  - asa, would not do statin given liver mets  - Normotension, notmoglycemia  - q4h neuro checks  - call neurology with questions  - full note to follow.    Dallas Hook MD  [facility] PGY 3

## 2020-03-07 NOTE — SUBJECTIVE & OBJECTIVE
Past Medical History:   Diagnosis Date    Hypertension     Stage IV adenocarcinoma of pancreas     Stroke 3/6/2020       Past Surgical History:   Procedure Laterality Date    APPENDECTOMY      duodenal stent      ENDOSCOPIC ULTRASOUND OF UPPER GASTROINTESTINAL TRACT N/A 1/20/2020    Procedure: ULTRASOUND, UPPER GI TRACT, ENDOSCOPIC;  Surgeon: Toni Messer MD;  Location: [address]);  Service: Endoscopy;  Laterality: N/A;    ESOPHAGOGASTRODUODENOSCOPY N/A 1/22/2020    Procedure: EGD (ESOPHAGOGASTRODUODENOSCOPY);  Surgeon: Cory Servin MD;  Location: [address]);  Service: Endoscopy;  Laterality: N/A;       Review of patient's allergies indicates:  No Known Allergies    No current facility-administered medications on file prior to encounter.      Current Outpatient Medications on File Prior to Encounter   Medication Sig    calcium carbonate (TUMS) 200 mg calcium (500 mg) chewable tablet Take 1 tablet (500 mg total) by mouth daily as needed.    famotidine (PEPCID) 20 MG tablet Take 1 tablet (20 mg total) by mouth daily as needed (heart burn).    HYDROcodone-acetaminophen (NORCO)  mg per tablet TAKE 1 TABLET BY MOUTH 4 TIMES DAILY AS NEEDED FOR PAIN.    lisinopril-hydrochlorothiazide (PRINZIDE,ZESTORETIC) 20-12.5 mg per tablet Take 1 tablet by mouth once daily.    mirtazapine (REMERON) 15 MG tablet Take 1 tablet (15 mg total) by mouth nightly.    ondansetron (ZOFRAN) 8 MG tablet Take 8 mg by mouth every 8 (eight) hours as needed.     ondansetron (ZOFRAN-ODT) 4 MG TbDL Take 1 tablet (4 mg total) by mouth every 8 (eight) hours as needed.    zolpidem (AMBIEN) 5 MG Tab Take 1 tablet (5 mg total) by mouth nightly as needed.    amLODIPine (NORVASC) 5 MG tablet Take 1 tablet (5 mg total) by mouth once daily.    aspirin-acetaminophen-caffeine 250-250-65 mg (EXCEDRIN EXTRA STRENGTH) 250-250-65 mg per tablet Take 2 tablets by mouth daily as needed for Pain.    pantoprazole (PROTONIX) 40  MG tablet Take 40 mg by mouth once daily.    promethazine (PHENERGAN) 25 MG suppository Place 25 mg rectally every 6 (six) hours as needed for Nausea.    sucralfate (CARAFATE) 1 gram tablet Take 1 g by mouth 4 (four) times daily.     Family History     Problem Relation (Age of Onset)    Breast cancer Mother    Colon cancer Mother    Depression Daughter    Pancreatic cancer Maternal Uncle    Prostate cancer Brother        Tobacco Use    Smoking status: Never Smoker    Smokeless tobacco: Never Used   Substance and Sexual Activity    Alcohol use: Never     Frequency: Never    Drug use: Never    Sexual activity: Not Currently     Review of Systems   Constitutional: Negative for chills, diaphoresis and fever.   Eyes: Negative for photophobia.   Respiratory: Negative for cough, chest tightness, shortness of breath and wheezing.    Cardiovascular: Negative for chest pain, palpitations and leg swelling.   Gastrointestinal: Positive for nausea. Negative for abdominal pain, diarrhea and vomiting.   Genitourinary: Negative for dysuria, flank pain, frequency and hematuria.   Musculoskeletal: Negative for back pain and myalgias.   Neurological: Positive for weakness (right upper extremity ). Negative for dizziness, syncope, light-headedness and headaches.   Psychiatric/Behavioral: Negative for confusion.     Objective:     Vital Signs (Most Recent):  Temp: 97.3 °F (36.3 °C) (03/07/20 0307)  Pulse: 82 (03/07/20 0400)  Resp: 17 (03/07/20 0307)  BP: 134/73 (03/07/20 0307)  SpO2: 96 % (03/07/20 0454) Vital Signs (24h Range):  Temp:  [97.3 °F (36.3 °C)-98.5 °F (36.9 °C)] 97.3 °F (36.3 °C)  Pulse:  [82-97] 82  Resp:  [16-20] 17  SpO2:  [96 %-99 %] 96 %  BP: (124-157)/(72-86) 134/73     Weight: 68.8 kg (151 lb 10.8 oz)  Body mass index is 21.15 kg/m².    Physical Exam   Constitutional: She is oriented to person, place, and time. She appears well-developed and well-nourished.   HENT:   Head: Normocephalic and atraumatic.   Eyes:  Pupils are equal, round, and reactive to light. Conjunctivae are normal.   Neck: Normal range of motion. No JVD present.   Cardiovascular: Normal rate, regular rhythm, normal heart sounds and intact distal pulses.   Pulmonary/Chest: Effort normal. No respiratory distress. She has no wheezes.   Abdominal: Soft. Bowel sounds are normal. She exhibits no distension. There is no tenderness. There is no guarding.   Musculoskeletal: Normal range of motion. She exhibits no edema or tenderness.   Neurological: She is alert and oriented to person, place, and time. No cranial nerve deficit.   Skin: Skin is warm and dry. Capillary refill takes less than 2 seconds.   Psychiatric: She has a normal mood and affect. Her behavior is normal.         CRANIAL NERVES     CN III, IV, VI   Pupils are equal, round, and reactive to light.       Significant Labs:   BMP:   Recent Labs   Lab 03/07/20  0438   *   *   K 3.2*   CL 95   CO2 28   BUN 12   CREATININE 0.7   CALCIUM 8.4*   MG 1.7     CBC:   Recent Labs   Lab 03/07/20  0438   WBC 16.66*   HGB 10.9*   HCT 32.8*   *     Urine Studies:   Recent Labs   Lab 03/07/20  0237   COLORU [place]*   APPEARANCEUA Clear   PHUR 6.0   SPECGRAV >=1.030*   PROTEINUA 1+*   GLUCUA Negative   KETONESU 1+*   BILIRUBINUA 2+*   OCCULTUA Negative   NITRITE Negative   UROBILINOGEN 1.0   LEUKOCYTESUR Trace*   RBCUA 1   WBCUA 5   BACTERIA Occasional   SQUAMEPITHEL 4   HYALINECASTS 1       Significant Imaging: I have reviewed all pertinent imaging results/findings within the past 24 hours.

## 2020-03-07 NOTE — ASSESSMENT & PLAN NOTE
Neurology consult   Check MRI/MRA, carotid US   Neuro checks q4h   Check lipid panel, A1C  ASA/statin

## 2020-03-07 NOTE — PLAN OF CARE
VN cued into patients room for rounding. VN role explained and informed pt that VN will be working alongside the bedside care team throughout the day. Pt verbalized understanding that VN is available for any questions and education, and nurse and PCT will continue hourly rounding at bedside. Pain medication schedule reviewed with patient - patient states her pain is well controlled at this time. Fall education provided. Allotted time given for questions - all questions answered. Will continue to monitor and intervene PRN.

## 2020-03-08 VITALS
OXYGEN SATURATION: 96 % | TEMPERATURE: 98 F | WEIGHT: 151.69 LBS | HEART RATE: 91 BPM | RESPIRATION RATE: 17 BRPM | HEIGHT: 71 IN | DIASTOLIC BLOOD PRESSURE: 69 MMHG | BODY MASS INDEX: 21.24 KG/M2 | SYSTOLIC BLOOD PRESSURE: 130 MMHG

## 2020-03-08 PROBLEM — I63.9: Status: ACTIVE | Noted: 2020-03-06

## 2020-03-08 LAB
ALBUMIN SERPL BCP-MCNC: 2 G/DL (ref 3.5–5.2)
ALP SERPL-CCNC: 328 U/L (ref 55–135)
ALT SERPL W/O P-5'-P-CCNC: 21 U/L (ref 10–44)
ANION GAP SERPL CALC-SCNC: 14 MMOL/L (ref 8–16)
AST SERPL-CCNC: 39 U/L (ref 10–40)
BASOPHILS # BLD AUTO: 0.04 K/UL (ref 0–0.2)
BASOPHILS NFR BLD: 0.2 % (ref 0–1.9)
BILIRUB SERPL-MCNC: 0.9 MG/DL (ref 0.1–1)
BUN SERPL-MCNC: 14 MG/DL (ref 8–23)
CALCIUM SERPL-MCNC: 8.1 MG/DL (ref 8.7–10.5)
CHLORIDE SERPL-SCNC: 94 MMOL/L (ref 95–110)
CO2 SERPL-SCNC: 26 MMOL/L (ref 23–29)
CREAT SERPL-MCNC: 0.7 MG/DL (ref 0.5–1.4)
DIFFERENTIAL METHOD: ABNORMAL
EOSINOPHIL # BLD AUTO: 0.1 K/UL (ref 0–0.5)
EOSINOPHIL NFR BLD: 0.3 % (ref 0–8)
ERYTHROCYTE [DISTWIDTH] IN BLOOD BY AUTOMATED COUNT: 16.7 % (ref 11.5–14.5)
EST. GFR  (AFRICAN AMERICAN): >60 ML/MIN/1.73 M^2
EST. GFR  (NON AFRICAN AMERICAN): >60 ML/MIN/1.73 M^2
GLUCOSE SERPL-MCNC: 116 MG/DL (ref 70–110)
HCT VFR BLD AUTO: 31.6 % (ref 37–48.5)
HGB BLD-MCNC: 10.5 G/DL (ref 12–16)
IMM GRANULOCYTES # BLD AUTO: 0.16 K/UL (ref 0–0.04)
IMM GRANULOCYTES NFR BLD AUTO: 0.8 % (ref 0–0.5)
LYMPHOCYTES # BLD AUTO: 1 K/UL (ref 1–4.8)
LYMPHOCYTES NFR BLD: 5.5 % (ref 18–48)
MAGNESIUM SERPL-MCNC: 1.6 MG/DL (ref 1.6–2.6)
MCH RBC QN AUTO: 31 PG (ref 27–31)
MCHC RBC AUTO-ENTMCNC: 33.2 G/DL (ref 32–36)
MCV RBC AUTO: 93 FL (ref 82–98)
MONOCYTES # BLD AUTO: 1.4 K/UL (ref 0.3–1)
MONOCYTES NFR BLD: 7.5 % (ref 4–15)
NEUTROPHILS # BLD AUTO: 16.1 K/UL (ref 1.8–7.7)
NEUTROPHILS NFR BLD: 85.7 % (ref 38–73)
NRBC BLD-RTO: 0 /100 WBC
PLATELET # BLD AUTO: 417 K/UL (ref 150–350)
PMV BLD AUTO: 9.7 FL (ref 9.2–12.9)
POTASSIUM SERPL-SCNC: 3 MMOL/L (ref 3.5–5.1)
PROT SERPL-MCNC: 5.8 G/DL (ref 6–8.4)
RBC # BLD AUTO: 3.39 M/UL (ref 4–5.4)
SODIUM SERPL-SCNC: 134 MMOL/L (ref 136–145)
WBC # BLD AUTO: 18.83 K/UL (ref 3.9–12.7)

## 2020-03-08 PROCEDURE — 25000003 PHARM REV CODE 250: Performed by: NURSE PRACTITIONER

## 2020-03-08 PROCEDURE — 97161 PT EVAL LOW COMPLEX 20 MIN: CPT

## 2020-03-08 PROCEDURE — 99223 PR INITIAL HOSPITAL CARE,LEVL III: ICD-10-PCS | Mod: ,,, | Performed by: INTERNAL MEDICINE

## 2020-03-08 PROCEDURE — 63600175 PHARM REV CODE 636 W HCPCS: Performed by: NURSE PRACTITIONER

## 2020-03-08 PROCEDURE — 85025 COMPLETE CBC W/AUTO DIFF WBC: CPT

## 2020-03-08 PROCEDURE — 80053 COMPREHEN METABOLIC PANEL: CPT

## 2020-03-08 PROCEDURE — 99223 1ST HOSP IP/OBS HIGH 75: CPT | Mod: ,,, | Performed by: INTERNAL MEDICINE

## 2020-03-08 PROCEDURE — 83735 ASSAY OF MAGNESIUM: CPT

## 2020-03-08 PROCEDURE — 92610 EVALUATE SWALLOWING FUNCTION: CPT

## 2020-03-08 PROCEDURE — 97165 OT EVAL LOW COMPLEX 30 MIN: CPT

## 2020-03-08 PROCEDURE — 94761 N-INVAS EAR/PLS OXIMETRY MLT: CPT

## 2020-03-08 PROCEDURE — 36415 COLL VENOUS BLD VENIPUNCTURE: CPT

## 2020-03-08 RX ORDER — ASPIRIN 81 MG/1
81 TABLET ORAL DAILY
Qty: 30 TABLET | Refills: 0 | Status: SHIPPED | OUTPATIENT
Start: 2020-03-08 | End: 2021-03-08

## 2020-03-08 RX ORDER — POTASSIUM CHLORIDE 7.45 MG/ML
10 INJECTION INTRAVENOUS
Status: DISPENSED | OUTPATIENT
Start: 2020-03-08 | End: 2020-03-08

## 2020-03-08 RX ORDER — POTASSIUM CHLORIDE 20 MEQ/15ML
40 SOLUTION ORAL
Status: DISCONTINUED | OUTPATIENT
Start: 2020-03-08 | End: 2020-03-08

## 2020-03-08 RX ORDER — CLOPIDOGREL BISULFATE 75 MG/1
75 TABLET ORAL DAILY
Qty: 30 TABLET | Refills: 2 | Status: SHIPPED | OUTPATIENT
Start: 2020-03-08 | End: 2020-03-08 | Stop reason: SDUPTHER

## 2020-03-08 RX ORDER — CLOPIDOGREL BISULFATE 75 MG/1
75 TABLET ORAL DAILY
Qty: 30 TABLET | Refills: 2 | Status: SHIPPED | OUTPATIENT
Start: 2020-03-08 | End: 2021-03-08

## 2020-03-08 RX ORDER — MAGNESIUM SULFATE HEPTAHYDRATE 40 MG/ML
2 INJECTION, SOLUTION INTRAVENOUS ONCE
Status: COMPLETED | OUTPATIENT
Start: 2020-03-08 | End: 2020-03-08

## 2020-03-08 RX ADMIN — ONDANSETRON 4 MG: 2 INJECTION INTRAMUSCULAR; INTRAVENOUS at 05:03

## 2020-03-08 RX ADMIN — ASPIRIN 81 MG: 81 TABLET, COATED ORAL at 08:03

## 2020-03-08 RX ADMIN — MAGNESIUM SULFATE IN WATER 2 G: 40 INJECTION, SOLUTION INTRAVENOUS at 11:03

## 2020-03-08 RX ADMIN — ONDANSETRON 4 MG: 2 INJECTION INTRAMUSCULAR; INTRAVENOUS at 01:03

## 2020-03-08 RX ADMIN — POTASSIUM CHLORIDE 10 MEQ: 7.46 INJECTION, SOLUTION INTRAVENOUS at 12:03

## 2020-03-08 RX ADMIN — HYDROCODONE BITARTRATE AND ACETAMINOPHEN 1 TABLET: 5; 325 TABLET ORAL at 05:03

## 2020-03-08 RX ADMIN — HYDROCHLOROTHIAZIDE 12.5 MG: 12.5 TABLET ORAL at 08:03

## 2020-03-08 RX ADMIN — HYDROCODONE BITARTRATE AND ACETAMINOPHEN 1 TABLET: 5; 325 TABLET ORAL at 02:03

## 2020-03-08 RX ADMIN — ONDANSETRON 4 MG: 2 INJECTION INTRAMUSCULAR; INTRAVENOUS at 09:03

## 2020-03-08 RX ADMIN — LISINOPRIL 20 MG: 20 TABLET ORAL at 08:03

## 2020-03-08 NOTE — DISCHARGE SUMMARY
"[facility] Medicine  Discharge Summary      Patient Name: Radha Alcazar  MRN: [id number]  Admission Date: 3/6/2020  Hospital Length of Stay: 2 days  Discharge Date and Time:  03/08/2020 6:13 PM  Attending Physician: Myles Christian DO   Discharging Provider: Bridget Mohamud NP  Primary Care Provider: Hossein Mortensen MD      HPI:   Radha Alcazar, 63 yo F, with hypertension, stage IV pancreatic cancer with liver mets, peritoneal carcinomatosis, history of partial small-bowel obstruction of the duodenum status post duodenal stent on 1/22/20 and depression presented to [facility] with 4/5 right-sided upper extremity weakness. Last known normal early am per patient. She reports while in the store with her daughter she noted she was unable to grasp her credit card when attempting to make a payment. She reports "my right arm kept falling and fingers wouldn't work." Exact time of onset unclear. Pt denies facial droop, aphasia and visual disturbance. No chest pain. She reports chronic SOB. Per patient symptoms resolved during ED evaluation.     CT head negative. Pt evaluated per vascular neurology- no indication for TPA, recommend transfer to [facility] for neurology consult and CVA workup.           * No surgery found *      Hospital Course:   Resolution of symptoms with no further episodes.  TTE with normal EF and negative bubble study. Holding statin due to HCC per Neuro recs. MRI shows multiple tiny thromboembolic infarcts in left frontoparietal lobes and right parietal lobe. MRA head and neck without any significant vascular abnormalities. After Dr. Christian and Hem/Onc spoke to patient, she has decided to transition to home hospice.  She has asked to be discharge today.  Aye, with [facility] spoke with the patient with the patient who is agreeable with discharge home and will meet with her tomorrow out-patient.  Per PT/OT, she is safe to go home with family support.  She is " sent home with paper Rx for Clopidogrel to discuss whether or not initiate this preventive treatment with Hospice care.  She is physically stable for discharge.     Consults:   Consults (From admission, onward)        Status Ordering Provider     Inpatient consult to Hematology/Oncology  Once     Provider:  Ray Pederson MD    Acknowledged SUKHI BETTS     Inpatient consult to Neurology  Once     Provider:  (Not yet assigned)    Completed GABBIE DICK     Inpatient consult to Social Work  Once     Provider:  (Not yet assigned)    Acknowledged SUKHI BETTS          * Thromboembolic stroke  Resolution of symptoms  Appreciate Neurology  MRI/MRA, carotid US Pending  TTE with normal EF and negative Bubble Study  TChol 140. Holding Statin in setting of HCC  A1c 5.4  CHeck Folate and B12  Neuro checks q4h   ASA  Passed bedside swallow. OK for Diet  PT/OT/ST assessments      Subclinical hypothyroidism  TSH 12.6, Free T4 0.91  --Defer to PCP whether or not to treat.       Anemia in neoplastic disease  Stable, Monitor      Hypokalemia  Replace      Pleural effusion, right  Stable       Essential hypertension  Continue lisinopril-HTZ 20-12.5 MG daily         Final Active Diagnoses:    Diagnosis Date Noted POA    PRINCIPAL PROBLEM:  Thromboembolic stroke [I63.9] 03/06/2020 Yes    Subclinical hypothyroidism [E03.9] 03/07/2020 Yes    Stage IV adenocarcinoma of pancreas [C25.9] 02/04/2020 Yes     Chronic    Anemia in neoplastic disease [D63.0] 01/18/2020 Yes     Chronic    Pancreatic carcinoma metastatic to liver [C25.9, C78.7] 01/18/2020 Yes     Chronic    Pleural effusion, right [J90] 01/18/2020 Yes     Chronic    Hypokalemia [E87.6] 01/18/2020 Unknown    Essential hypertension [I10] 01/17/2020 Yes     Chronic      Problems Resolved During this Admission:       Discharged Condition: stable    Disposition: Home or Self Care    Follow Up:  Follow-up Information     [facility] In 1 day.    Why:  Hospice  will contact patient.   Contact information:  [address]  [place] [address] [phone]               Patient Instructions:      Diet full liquid   Order Comments: As Tolerated     Activity as tolerated       Significant Diagnostic Studies: Labs:   CMP   Recent Labs   Lab 03/07/20  0438 03/08/20  0508   * 134*   K 3.2* 3.0*   CL 95 94*   CO2 28 26   * 116*   BUN 12 14   CREATININE 0.7 0.7   CALCIUM 8.4* 8.1*   PROT 5.9* 5.8*   ALBUMIN 2.1* 2.0*   BILITOT 0.9 0.9   ALKPHOS 338* 328*   AST 39 39   ALT 22 21   ANIONGAP 12 14   ESTGFRAFRICA >60 >60   EGFRNONAA >60 >60   , CBC   Recent Labs   Lab 03/07/20  0438 03/08/20  0508   WBC 16.66* 18.83*   HGB 10.9* 10.5*   HCT 32.8* 31.6*   * 417*   , INR   Lab Results   Component Value Date    INR 1.1 03/07/2020    INR 1.1 01/22/2020    INR 1.5 (H) 01/18/2020   , Lipid Panel   Lab Results   Component Value Date    CHOL 140 03/06/2020    HDL 43 03/06/2020    LDLCALC 76.0 03/06/2020    TRIG 105 03/06/2020    CHOLHDL 30.7 03/06/2020   , Troponin No results for input(s): TROPONINI in the last 168 hours., A1C:   Recent Labs   Lab 03/06/20  2151   HGBA1C 5.4    and All labs within the past 24 hours have been reviewed  Radiology:       MRA Neck without contrast [[id number]] Resulted: 03/07/20 1619   Order Status: Completed Updated: 03/07/20 1621   Narrative:     EXAMINATION:  MRA NECK WITHOUT CONTRAST    CLINICAL HISTORY:  Stroke;    TECHNIQUE:  Routine MRI brain without contrast, noncontrast MRA of the brain, and noncontrast MRA of the neck. Multiplanar multisequence MR imaging of the brain was performed without contrast. By separate acquisition, noncontrast MR angiography was performed of the intracranial vasculature with 3D time-of-flight technique through the [place], with MIP reformatting. Non-contrast 2D and/or 3D time-of-flight MR angiography of the neck was performed, with MIP reformatting.    COMPARISON:  None    FINDINGS:  Please  note that the patient was experiencing pain and ended exam prematurely.    No significant vascular abnormalities appreciated on the images that were obtained.   Impression:       Incomplete exam.  Patient reportedly terminated the exam secondary to pain.      Electronically signed by: Seun Anders MD  Date: 03/07/2020  Time: 16:19   MRA Brain without contrast [[id number]] Resulted: 03/07/20 1615   Order Status: Completed Updated: 03/07/20 1618   Narrative:     EXAMINATION:  MRA BRAIN WITHOUT CONTRAST    CLINICAL HISTORY:  Stroke;    TECHNIQUE:  Non-contrast 3-D time-of-flight intracranial MR angiography was performed through the [place] with MIP reformatting.    COMPARISON:  None    FINDINGS:  Vertebral arteries are codominant.  Vertebrobasilar system remains patent.  Majority of the right posterior cerebral circulation is supplied by the right posterior communicating artery.  Left posterior cerebral artery is within normal limits.  The anterior communicating artery, anterior cerebral, middle cerebral and internal carotid arteries are within normal limits.   Impression:       No significant vascular abnormality identified.      Electronically signed by: Seun Anders MD  Date: 03/07/2020  Time: 16:15   MRI Brain Without Contrast [[id number]] (Abnormal) Resulted: 03/07/20 1611   Order Status: Completed Updated: 03/07/20 1613   Narrative:     EXAMINATION:  MRI BRAIN WITHOUT CONTRAST    CLINICAL HISTORY:  Stroke;    TECHNIQUE:  Multiplanar multisequence MR imaging of the brain was performed without contrast.    COMPARISON:  None.    FINDINGS:  There are multiple tiny foci of restricted diffusion in the left frontoparietal lobe with an additional punctate area of restricted diffusion in the right posterior parietal lobe.  Given history, findings are most compatible with small infarcts.  Given bilaterally, please consider thromboembolic phenomena.    No acute intracranial bleed or extra-axial collection.  No mass  effect or shift of midline structures.  The ventricles and the basilar cisterns are patent.  No Chiari malformation.    Orbits are symmetric in appearance.  Mucoperiosteal thickening of the paranasal sinuses with tiny mucous retention cyst in the left maxillary sinus noted.  Cerebellopontine angles appear normal.  Intracranial flow voids are noted.  Multifocal areas of nonspecific supratentorial T2 FLAIR white matter signal alteration, likely sequelae of chronic microvascular ischemic disease.    This report was flagged in Epic as abnormal.   Impression:       Multiple tiny areas of diffusion restriction involving the left frontoparietal lobes as well as a punctate area of restricted diffusion in the right parietal lobe most compatible with tiny infarcts.  Given the appearance, the findings could be seen with thromboembolic phenomena.      Electronically signed by: Seun Anders MD  Date: 03/07/2020  Time: 16:11   US Carotid Bilateral [[id number]] Resulted: 03/07/20 1603   Order Status: Completed Updated: 03/07/20 1605   Narrative:     EXAMINATION:  US CAROTID BILATERAL    CLINICAL HISTORY:  TIA;    TECHNIQUE:  Grayscale and color Doppler ultrasound examination of the carotid and vertebral artery systems bilaterally.  Stenosis estimates are per the NASCET measurement criteria.    COMPARISON:  None.    FINDINGS:  Right:    Internal Carotid Artery (ICA) peak systolic velocity 87 cm/sec    ICA/CCA peak systolic velocity ratio: Plaque seen at the bifurcation    Plaque formation: 1.1    Vertebral artery: Antegrade flow and normal waveform.    Left:    Internal Carotid Artery (ICA)  peak systolic velocity 69 cm/sec    ICA/CCA peak systolic velocity ratio: 1.2    Plaque formation: No significant plaque seen.    Vertebral artery: Antegrade flow and normal waveform.   Impression:       No evidence of a hemodynamically significant carotid bifurcation stenosis.      Electronically signed by: Seun Anders MD  Date:  03/07/2020  Time: 16:03           Pending Diagnostic Studies:     Procedure Component Value Units Date/Time    MRI Brain W WO Contrast [[id number]]     Order Status:  Sent Lab Status:  No result          Medications:  Reconciled Home Medications:      Medication List      START taking these medications    aspirin 81 MG EC tablet  Commonly known as:  ECOTRIN  Take 1 tablet (81 mg total) by mouth once daily.     clopidogreL 75 mg tablet  Commonly known as:  PLAVIX  Take 1 tablet (75 mg total) by mouth once daily.        CONTINUE taking these medications    calcium carbonate 200 mg calcium (500 mg) chewable tablet  Commonly known as:  TUMS  Take 1 tablet (500 mg total) by mouth daily as needed.     Excedrin Extra Strength 250-250-65 mg per tablet  Generic drug:  aspirin-acetaminophen-caffeine 250-250-65 mg  Take 2 tablets by mouth daily as needed for Pain.     famotidine 20 MG tablet  Commonly known as:  PEPCID  Take 1 tablet (20 mg total) by mouth daily as needed (heart burn).     HYDROcodone-acetaminophen  mg per tablet  Commonly known as:  NORCO  TAKE 1 TABLET BY MOUTH 4 TIMES DAILY AS NEEDED FOR PAIN.     lisinopril-hydrochlorothiazide 20-12.5 mg per tablet  Commonly known as:  PRINZIDE,ZESTORETIC  Take 1 tablet by mouth once daily.     mirtazapine 15 MG tablet  Commonly known as:  Remeron  Take 1 tablet (15 mg total) by mouth nightly.     ondansetron 4 MG Tbdl  Commonly known as:  ZOFRAN-ODT  Take 1 tablet (4 mg total) by mouth every 8 (eight) hours as needed.     ondansetron 8 MG tablet  Commonly known as:  ZOFRAN  Take 8 mg by mouth every 8 (eight) hours as needed.     zolpidem 5 MG Tab  Commonly known as:  AMBIEN  Take 1 tablet (5 mg total) by mouth nightly as needed.        STOP taking these medications    amLODIPine 5 MG tablet  Commonly known as:  NORVASC     pantoprazole 40 MG tablet  Commonly known as:  PROTONIX     promethazine 25 MG suppository  Commonly known as:  PHENERGAN     sucralfate 1 gram  tablet  Commonly known as:  CARAFATE            Indwelling Lines/Drains at time of discharge:   Lines/Drains/Airways     None                 Time spent on the discharge of patient: 40 minutes  Patient was seen and examined on the date of discharge and determined to be suitable for discharge.         Bridget Mohamud, NP  Department of Hospital Medicine  [facility]

## 2020-03-08 NOTE — SUBJECTIVE & OBJECTIVE
Interval History:   MRI shows multiple tiny thromboembolic infarcts.  MRA head and neck without any significant vascular abnormalities. Hem/Onc consulted for anticoagulation recs.  Patient wants to go home.       Review of Systems   Constitutional: Negative for chills, diaphoresis and fever.   HENT: Negative for sore throat and trouble swallowing.    Eyes: Negative for photophobia and visual disturbance.   Respiratory: Negative for cough, shortness of breath and wheezing.    Cardiovascular: Negative for chest pain and palpitations.   Gastrointestinal: Positive for nausea. Negative for abdominal pain, constipation, diarrhea and vomiting.   Endocrine: Negative for polydipsia and polyphagia.   Genitourinary: Negative for decreased urine volume, dysuria, hematuria and urgency.   Musculoskeletal: Negative for joint swelling, neck pain and neck stiffness.   Neurological: Negative for weakness, numbness and headaches.   Psychiatric/Behavioral: Negative for agitation, dysphoric mood and suicidal ideas.     Objective:     Vital Signs (Most Recent):  Temp: 97.9 °F (36.6 °C) (03/08/20 0808)  Pulse: 106 (03/08/20 1142)  Resp: 18 (03/08/20 0808)  BP: 127/66 (03/08/20 0808)  SpO2: 97 % (03/08/20 0900) Vital Signs (24h Range):  Temp:  [97 °F (36.1 °C)-98.8 °F (37.1 °C)] 97.9 °F (36.6 °C)  Pulse:  [] 106  Resp:  [18] 18  SpO2:  [94 %-97 %] 97 %  BP: (127-139)/(66-72) 127/66     Weight: 68.8 kg (151 lb 10.8 oz)  Body mass index is 21.15 kg/m².    Intake/Output Summary (Last 24 hours) at 3/8/2020 1228  Last data filed at 3/8/2020 1218  Gross per 24 hour   Intake 440 ml   Output 1100 ml   Net -660 ml      Physical Exam   Constitutional: She is oriented to person, place, and time.   Eyes: Pupils are equal, round, and reactive to light. No scleral icterus.   Cardiovascular: Normal rate, regular rhythm, normal heart sounds and intact distal pulses.   No murmur heard.  Pulmonary/Chest: Effort normal and breath sounds normal. No  respiratory distress. She has no wheezes. She has no rales.   Abdominal: Soft. Bowel sounds are normal. She exhibits no distension. There is no tenderness.   Neurological: She is alert and oriented to person, place, and time.   Skin: Skin is warm. Capillary refill takes less than 2 seconds.   Psychiatric: She has a normal mood and affect.   Nursing note and vitals reviewed.      Significant Labs:   CBC:   Recent Labs   Lab 03/07/20  0438 03/08/20  0508   WBC 16.66* 18.83*   HGB 10.9* 10.5*   HCT 32.8* 31.6*   * 417*     CMP:   Recent Labs   Lab 03/07/20  0438 03/08/20  0508   * 134*   K 3.2* 3.0*   CL 95 94*   CO2 28 26   * 116*   BUN 12 14   CREATININE 0.7 0.7   CALCIUM 8.4* 8.1*   PROT 5.9* 5.8*   ALBUMIN 2.1* 2.0*   BILITOT 0.9 0.9   ALKPHOS 338* 328*   AST 39 39   ALT 22 21   ANIONGAP 12 14   EGFRNONAA >60 >60     Results for REBECA BLACK (MRN [id number]) as of 3/8/2020 12:30   Ref. Range 3/7/2020 15:03   Folate Latest Ref Range: 4.0 - 24.0 ng/mL 10.2   Vitamin B-12 Latest Ref Range: 210 - 950 pg/mL 1199 (H)     All pertinent labs within the past 24 hours have been reviewed.    Significant Imaging: I have reviewed all pertinent imaging results/findings within the past 24 hours.   EXAMINATION:  MRA NECK WITHOUT CONTRAST    CLINICAL HISTORY:  Stroke;    TECHNIQUE:  Routine MRI brain without contrast, noncontrast MRA of the brain, and noncontrast MRA of the neck. Multiplanar multisequence MR imaging of the brain was performed without contrast. By separate acquisition, noncontrast MR angiography was performed of the intracranial vasculature with 3D time-of-flight technique through the [place], with MIP reformatting. Non-contrast 2D and/or 3D time-of-flight MR angiography of the neck was performed, with MIP reformatting.    COMPARISON:  None      EXAMINATION:  MRI BRAIN WITHOUT CONTRAST    CLINICAL HISTORY:  Stroke;    TECHNIQUE:  Multiplanar multisequence MR imaging of the brain  was performed without contrast.    COMPARISON:  None.    FINDINGS:  There are multiple tiny foci of restricted diffusion in the left frontoparietal lobe with an additional punctate area of restricted diffusion in the right posterior parietal lobe.  Given history, findings are most compatible with small infarcts.  Given bilaterally, please consider thromboembolic phenomena.    No acute intracranial bleed or extra-axial collection.  No mass effect or shift of midline structures.  The ventricles and the basilar cisterns are patent.  No Chiari malformation.    Orbits are symmetric in appearance.  Mucoperiosteal thickening of the paranasal sinuses with tiny mucous retention cyst in the left maxillary sinus noted.  Cerebellopontine angles appear normal.  Intracranial flow voids are noted.  Multifocal areas of nonspecific supratentorial T2 FLAIR white matter signal alteration, likely sequelae of chronic microvascular ischemic disease.    This report was flagged in Epic as abnormal.      Impression       Multiple tiny areas of diffusion restriction involving the left frontoparietal lobes as well as a punctate area of restricted diffusion in the right parietal lobe most compatible with tiny infarcts.  Given the appearance, the findings could be seen with thromboembolic phenomena.     Narrative     EXAMINATION:  US CAROTID BILATERAL    CLINICAL HISTORY:  TIA;    TECHNIQUE:  Grayscale and color Doppler ultrasound examination of the carotid and vertebral artery systems bilaterally.  Stenosis estimates are per the NASCET measurement criteria.    COMPARISON:  None.    FINDINGS:  Right:    Internal Carotid Artery (ICA) peak systolic velocity 87 cm/sec    ICA/CCA peak systolic velocity ratio: Plaque seen at the bifurcation    Plaque formation: 1.1    Vertebral artery: Antegrade flow and normal waveform.    Left:    Internal Carotid Artery (ICA)  peak systolic velocity 69 cm/sec    ICA/CCA peak systolic velocity ratio: 1.2    Plaque  formation: No significant plaque seen.    Vertebral artery: Antegrade flow and normal waveform.      Impression       No evidence of a hemodynamically significant carotid bifurcation stenosis.

## 2020-03-08 NOTE — PLAN OF CARE
Plan of care reviewed with patient. Voices understanding.Patient denies pain and nausea. NSR on monitor with no red alarms noted. Side rails up x3, bed low, bed alarm on, call bell within reach. Will continue to monitor.

## 2020-03-08 NOTE — PLAN OF CARE
Problem: Occupational Therapy Goal  Goal: Occupational Therapy Goal  Description  Goals to be met by: 4/8/20     Patient will increase functional independence with ADLs by performing:    LE Dressing with Modified [place].  Grooming while standing with Modified [place].  Toileting from toilet with Modified [place] for hygiene and clothing management.   Supine to sit with Modified [place].  Step transfer with Modified [place]  Toilet transfer to toilet with Modified [place].  Increased functional strength to WFL for self care skills and functional mobility.  Upper extremity exercise program x10 reps per handout, with independence.     Outcome: Ongoing, Progressing       Pt would benefit from cont OT services in order to maximize functional independence. Recommending HHOT/PT at d/c.

## 2020-03-08 NOTE — PROGRESS NOTES
Progress Note  Neurology    Admit Date: 3/6/2020  LOS: 2    CC: TIA (transient ischemic attack)    SUBJECTIVE:     Interval History/Significant Events: Pt with nausea and vomiting. No new neurological events.    Review of Symptoms:   Constitutional: Denies fevers or chills.  Pulmonary: Denies shortness of breath or cough.  Cardiology: Denies chest pain or palpitations.  GI: Denies abdominal pain or constipation.  Neurologic: Denies new weakness, headaches, or paresthesias.     Medications:    Scheduled Meds:   aspirin  81 mg Oral Daily    enoxaparin  40 mg Subcutaneous Daily    hydroCHLOROthiazide  12.5 mg Oral Daily    lisinopril  20 mg Oral Daily    magnesium sulfate IVPB  2 g Intravenous Once    mirtazapine  15 mg Oral Nightly    potassium chloride 10%  40 mEq Oral Q3H     PRN Meds:.acetaminophen, calcium carbonate, famotidine, HYDROcodone-acetaminophen, ondansetron, promethazine (PHENERGAN) IVPB    OBJECTIVE:     Physical Exam:    Vital Signs (24h Range):   Temp:  [97 °F (36.1 °C)-98.8 °F (37.1 °C)] 97.9 °F (36.6 °C)  Pulse:  [] 92  Resp:  [18] 18  SpO2:  [94 %-97 %] 97 %  BP: (127-139)/(66-72) 127/66  Neurological Exam   Mental Status:  Alert and oriented to self, place, and time.      Language:  No aphasia, no dysarthria.      Cranial Nerves:  Visual fields were intact to confrontation, no RAPD, no anisocoria, EOM intact bilaterally, V1-V3 with good sensation to light touch, no facial asymmetry, hearing grossly intact, palate, and tongue midline. Good shoulder rug.      Motor:  Strength: 5/5 in all 4 extremities through out.   Tone: Good muscle tone.    No pronator drift     DTRs:  Symmetric and 2+ through out.      Sensation:  Intact to light touch through out.     Cerebellar:  Negative Romberg   Good finger to nose.  Good heel to shin       Gait and Stand:  deferred    Labs:    BMP:  Recent Labs   Lab 03/08/20  0508   *   K 3.0*   CL 94*   CO2 26   BUN 14   CREATININE 0.7   *   MG  1.6       LFT:   Lab Results   Component Value Date    AST 39 03/08/2020    ALT 21 03/08/2020    ALKPHOS 328 (H) 03/08/2020    BILITOT 0.9 03/08/2020    ALBUMIN 2.0 (L) 03/08/2020    PROT 5.8 (L) 03/08/2020       CBC:   Lab Results   Component Value Date    WBC 18.83 (H) 03/08/2020    HGB 10.5 (L) 03/08/2020    HCT 31.6 (L) 03/08/2020    MCV 93 03/08/2020     (H) 03/08/2020       Microbiology x 7d:   Microbiology Results (last 7 days)     ** No results found for the last 168 hours. **          Imaging:  I have personally reviewed the images below:        ASSESSMENT/PLAN:     65 yo F w PMH of metastatic pancreatic cancer presents with transient neurological deficits, currently back to baseline.     - MRI without contrast shows multiple punctate areas of ischema in the L parietal lobe. I do not appreciate area in R parietal lobe; potentially embolic although given distribution potentionally 2/2 hypoperfusion however blood pressure has been normal while inpatient  - TTE normal; no LAE or Afib noted, no shunt  - MRA head and neck without stenosis/occlusion  - Would recommend Holter event monitor when discharged  - At this time however even if ischemia is 2/2 emboli do not recommend anticoagulation with history of liver mets which are subject to bleeding  - cont ASA 81 mg and add Plavix 75 mg for 21 days. After this monotherapy with just ASA 81 mg. Agree with no statin.  - B12/folate ok. A1C normal. TSH 12; T4 0.9; defer thyroid management ot primary team  - will follow-up MRI with contrast.  - PT/OT/ST    Will follow peripherally. Thank you for the consult.     Ivy Caldera MD  [employer] [facility], PGY-IV

## 2020-03-08 NOTE — HPI
Pt is a 63 yo F with HTN, recently diagnosed metastatic pancreatic cancer under the care of Dr Gutiérrez presented to [facility] with complaint of acute onset of right-sided upper extremity weakness.  US of the carotid on 3/07/20 showed no clear evidence of stenosis.  MRI of the brain and MRA of the brain and neck on 3/07/20 showed multiple tiny areas of diffusion restriction involving the left frontoparietal lobes as well as a punctate area of restricted diffusion in the right parietal lobe most compatible with tiny infarcts. No significant vascular abnormalities were identified.  Neurocritical care was consulted and recommended ASA and Heme/Onc consult given concern for hypercoagulable state given her malignancy. Echo done on 3/07/20 showed normal left ventricular systolic function. The estimated ejection fraction is 55%, left ventricular diastolic dysfunction, and negative bubble study. Currently the patient complains of feeling bloated, nausea, and constipation.  She endorses SOB but denies cough, fever, chills.

## 2020-03-08 NOTE — PLAN OF CARE
Problem: Physical Therapy Goal  Goal: Physical Therapy Goal  Description  Goals to be met by: 20     Patient will increase functional independence with mobility by performin. Sit to stand transfer with Supervision  2. Bed to chair transfer with Supervision   3. Gait  x 150 feet with Supervision   4. Lower extremity exercise program x 10 reps per handout, with supervision     Outcome: Ongoing, Progressing     PT evaluation completed, note to follow. Pt with primary complaint of nausea today limiting her tolerance to further mobility. Ambulated 75 ft with no AD and CGA with flexed posture and B knees remaining flexed. Recommending HH PT/OT upon d/c, DME needs TBD.

## 2020-03-08 NOTE — PLAN OF CARE
12:57 pm, TN contacted by Dr Christian, SW Consult placed for Hospice care. Per Dr Christian, he has already contacted Alisia with [facility].      1:35pm, TN contacted by Alisia with [facility] [phone]. Per Alisia, they do not service the [place] area. Pt informed both she and Dr Christian that her  is currently under the care of [facility] and she would like them as well.      2:10 pm, TN spoke with [facility] on call, Aye [phone], fax [phone] (cell [phone]). She will reach out to patient and call TN back. Once she has determined what equipment will be needed, they will try to make arrangements to deliver the equipment to pt's brother's home [address] [place] [address], [place], [place] [address] where pt is discharging to.     2:38 pm, TN received call fromwith [facility] on call, Aye. She has spoken with the patient. Pt wishes to d/c tonight if possible. Pt currently listed a Full code, per Dr Christian, he will change her to DNR.    Pt's family to provide transportation home    6:15pm, pt flipped back to Full Code Status. TN contacted Aye with [facility], she stated that she was going to pt's home to admit her tonight and DNR would be signed at that time.    Future Appointments   Date Time Provider Department Center   3/9/2020  4:20 PM Lamine Phan MD [facility]       Pt's nurse will go over medications/signs and symptoms prior to discharge       03/08/20 1643   Final Note   Assessment Type Final Discharge Note   Anticipated Discharge Disposition [facility]  ([facility])   What phone number can be called within the next 1-3 days to see how you are doing after discharge? [phone]   Hospital Follow Up  Appt(s) scheduled? No   Right Care Referral Info   Post Acute Recommendation Other   Referral Type [facility]   Facility Name [facility]     Angle Acevedo, RN Transitional Navigator  [phone]

## 2020-03-08 NOTE — DISCHARGE INSTRUCTIONS
Hospice, Starting (English) View Edit Remove  Hospice: The Importance of Managing Pain (English) View Edit Remove  What Is Hospice? (English) View Edit Remove    Hospice, Starting (English) View Edit Remove   Hospice: The Importance of Managing Pain (English) View Edit Remove   What Is Hospice? (English) View Edit Remove

## 2020-03-08 NOTE — PLAN OF CARE
VN cued into patients room for DC instructions. Discharge teaching was reviewed with patient. Pt verbalized understanding of medications/changes to medications, FU appts, clinical education and resources. Refer to clinical references for pt education. Tele and IV to be removed by bedside nurse prior to leaving. All questions answered. Wheelchair transport requested.

## 2020-03-08 NOTE — PLAN OF CARE
VN cued into patients room for rounding. VN role explained and informed pt that VN will be working alongside the bedside care team throughout the day. Pt verbalized understanding that VN is available for any questions and education, and nurse and PCT will continue hourly rounding at bedside. Patient sitting up in bed; speech at bedside. Fall education provided. Allotted time given for questions - all questions answered. Will continue to monitor and intervene PRN.

## 2020-03-08 NOTE — NURSING
Discharge instructions and education provided. Patient voices understanding. IV site and telemetry discontinued without adverse reaction. Patient left via wheelchair with no distress noted.

## 2020-03-08 NOTE — PT/OT/SLP EVAL
Speech Language Pathology Evaluation  Bedside Swallow    Patient Name:  Radha Alcazar   MRN:  [id number]  Admitting Diagnosis: TIA (transient ischemic attack)    Recommendations:                 General Recommendations:  Follow-up not indicated  Diet recommendations:  Other (see comments)(Pt reported consuming only liquids/Ensure since January), Full liquids   Aspiration Precautions: HOB to 90 degrees   General Precautions: Standard,    Communication strategies:  none    History:     Past Medical History:   Diagnosis Date    Hypertension     Stage IV adenocarcinoma of pancreas     Stroke 3/6/2020    Subclinical hypothyroidism 3/7/2020       Past Surgical History:   Procedure Laterality Date    APPENDECTOMY      duodenal stent      ENDOSCOPIC ULTRASOUND OF UPPER GASTROINTESTINAL TRACT N/A 1/20/2020    Procedure: ULTRASOUND, UPPER GI TRACT, ENDOSCOPIC;  Surgeon: Toni Messer MD;  Location: [facility] ([address] [place] [address]);  Service: Endoscopy;  Laterality: N/A;    ESOPHAGOGASTRODUODENOSCOPY N/A 1/22/2020    Procedure: EGD (ESOPHAGOGASTRODUODENOSCOPY);  Surgeon: Cory Servin MD;  Location: [facility] ([address] [place] [address]);  Service: Endoscopy;  Laterality: N/A;       Social History: Patient lives with brother at his home.    Prior Intubation HX:  Not indicated this admit.    Modified Barium Swallow: Not indicated this admit.    Chest X-Rays: Not indicated this admit.    Prior diet: Liquids including Ensure    Occupation/hobbies/homemaking: none stated    Subjective     Principal Problem:TIA (transient ischemic attack)     Chief Complaint: No chief complaint on file.        HPI: Radha Alcazar, 65 yo F, with hypertension, stage IV pancreatic cancer with liver mets, peritoneal carcinomatosis, history of partial small-bowel obstruction of the duodenum status post duodenal stent on 1/22/20 and depression presented to [facility] with 4/5 right-sided upper extremity weakness. Last known normal early am per patient. She reports  "while in the store with her daughter she noted she was unable to grasp her credit card when attempting to make a payment. She reports "my right arm kept falling and fingers wouldn't work." Exact time of onset unclear. Pt denies facial droop, aphasia and visual disturbance. No chest pain. She reports chronic SOB. Per patient symptoms resolved during ED evaluation.      CT head negative. Pt evaluated per vascular neurology- no indication for TPA, recommend transfer to [place] for neurology consult and CVA workup.                    Past Medical History:   Diagnosis Date    Hypertension      Stage IV adenocarcinoma of pancreas      Stroke 3/6/2020               Past Surgical History:   Procedure Laterality Date    APPENDECTOMY        duodenal stent        ENDOSCOPIC ULTRASOUND OF UPPER GASTROINTESTINAL TRACT N/A 1/20/2020     Procedure: ULTRASOUND, UPPER GI TRACT, ENDOSCOPIC;  Surgeon: Toni Messer MD;  Location: [facility] ([address] [place] [address]);  Service: Endoscopy;  Laterality: N/A;    ESOPHAGOGASTRODUODENOSCOPY N/A 1/22/2020     Procedure: EGD (ESOPHAGOGASTRODUODENOSCOPY);  Surgeon: Cory Servin MD;  Location: [address]);  Service: Endoscopy;  Laterality: N/A;         Review of patient's allergies indicates:  No Known Allergies       Patient goals: to go home    Pain/Comfort:  · Pain Rating 1: 0/10    Objective:     Oral Musculature Evaluation  · Oral Musculature: WFL  · Dentition: present and adequate  · Secretion Management: adequate  · Mucosal Quality: good  · Mandibular Strength and Mobility: WFL  · Oral Labial Strength and Mobility: WFL  · Lingual Strength and Mobility: WFL  · Buccal Strength and Mobility: WFL  · Volitional Cough: WFL  · Volitional Swallow: WFL  · Voice Prior to PO Intake: clear vocal quality    Bedside Swallow Eval:   Consistencies Assessed:  · Thin liquids approx 3 oz via self regulated straw sips     Oral Phase:   · WFL    Pharyngeal Phase:   · no overt clinical signs/symptoms of " "aspiration  · no overt clinical signs/symptoms of pharyngeal dysphagia    Compensatory Strategies  · None    Treatment: ST provided bedside swallow evaluation after confirming with EVELYNE Nguyen.     Assessment:     Radha Alcazar is a 64 y.o. female with an admitting diagnosis of TIA.  She presents with oropharyngeal skills WFL this evaluation. Pt seated on edge of bed this evaluation. Oral [facility] exam revealed labial, lingual, and buccal strength/coordination WFL. Pt verbally described events prior to admit, stating right arm weakness and difficulty "walking right." Patient denied facial/lingual numbness/weakness, denied speech/swallow difficulties. ST educated pt on steps to bedside swallow study. Pt declined to be tested on solids, stated that since her January of this year, she has only been able to consume thin liquids and Ensure without vomiting. Pt reported MD aware. Per medical chart, pt has stage IV pancreatic cancer with liver mets, peritoneal carcinomatosis, history of partial small-bowel obstruction of the duodenum status post duodenal stent on 1/22/20. Pt was observed to consume approx 3 oz of thin liquid via self regulated straw sips. No anterior loss noted. JAMES WFL. Laryngeal elevation/excursion WFL on palpation. No overt s/s of aspiration were noted during or following PO intake. Vocal quality clear prior to and following PO intake. Pt denied odynophagia, denied globus sensation. Pt reported taking "2 or 3" whole pills at a time with water without gagging/vomiting. Nurse practitioner entered room at end of this visit, and ST discussed swallow evaluation as well as pt's reported current PO diet. Recommendation for full liquids over clear, as Ensure is not part of clear diet. Pt stated she will avoid any solid items that she knows will cause nausea, such as pudding. Recommend: 1) full liquid diet; 2) Upright position or HOB raised during and 30 minutes following PO intake. These recommendations were " discussed with patient, NP, and RN Loreto GOMES informed via secure chat. Further ST services not warranted at this time 2/2 pt's oropharyngeal function WFL this evaluation.    Goals:   Multidisciplinary Problems     SLP Goals        Problem: SLP Goal    Goal Priority Disciplines Outcome   SLP Goal     SLP    Description:  1. Pt will participate in bedside swallow evaluation to aid in determining safest, most efficient means of PO intake--GOAL MET 3/8/20                    Plan:     · Patient to be seen:      · Plan of Care expires:     · Plan of Care reviewed with:      · SLP Follow-Up:  No       Discharge recommendations:      Barriers to Discharge:  None    Time Tracking:     SLP Treatment Date:   03/08/20  Speech Start Time:  1035  Speech Stop Time:  1050     Speech Total Time (min):  15 min    Billable Minutes: Eval Swallow and Oral Function 15    Angle Arias CCC-SLP  03/08/2020

## 2020-03-08 NOTE — PT/OT/SLP EVAL
Physical Therapy Evaluation    Patient Name:  Radha Alcazar   MRN:  [id number]    Recommendations:     Discharge Recommendations:  home health PT, home health OT   Discharge Equipment Recommendations: (TBD)   Barriers to discharge: None    Assessment:     Radha Alcazar is a 64 y.o. female admitted with a medical diagnosis of TIA (transient ischemic attack).  She presents with the following impairments/functional limitations:  weakness, impaired endurance, impaired self care skills, impaired functional mobilty, gait instability, impaired balance, decreased ROM. Pt with primary complaint of nausea today limiting her tolerance to further mobility. Ambulated 75 ft with no AD and CGA with flexed posture and B knees remaining flexed. Recommending HH PT/OT upon d/c, DME needs TBD.    Rehab Prognosis: Good; patient would benefit from acute skilled PT services to address these deficits and reach maximum level of function.    Recent Surgery: * No surgery found *      Plan:     During this hospitalization, patient to be seen 5 x/week to address the identified rehab impairments via gait training, therapeutic activities, therapeutic exercises and progress toward the following goals:    · Plan of Care Expires:  04/08/20    Subjective     Chief Complaint: nausea  Patient/Family Comments/goals: pt reporting nausea throughout session, stating no relief from nausea  Pain/Comfort:  · Pain Rating 1: 0/10(c/o nausea -- reports having baseline lower back pain when asked but not rated)  · Pain Rating Post-Intervention 1: 0/10    Patients cultural, spiritual, Nondenominational conflicts given the current situation: no    Living Environment:  Pt lives with her brother and sister-in-law in a [facility] with 4 SHELLIE with L railing and tub/shower combo with shower chair and grab bars.  Prior to admission, patients level of function was mod I/supvision with no AD for mobility and ADLs, does not drive. BSC over toilet. Equipment used at home: shower  chair, grab bar, bedside commode.  DME owned (not currently used): none.  Upon discharge, patient will have assistance from her brother works but her NAVYA is home with her at all times when he is not home.    Objective:     Communicated with nurse Nguyen prior to session.  Patient found HOB elevated with bed alarm, telemetry  upon PT entry to room.    General Precautions: Standard, fall   Orthopedic Precautions:N/A   Braces: N/A     Exams:  · Cognitive Exam:  Patient is oriented to Person, Place, Time and Situation  · Gross Motor Coordination:  WFL  · Postural Exam:  Patient presented with the following abnormalities:    · -       Rounded shoulders  · -       Flexed trunk and knees during ambulation  · Sensation:    · -       Intact  · Skin Integrity/Edema:      · -       Skin integrity: Visible skin intact  · RLE ROM: WFL except lacking ~30 deg from terminal knee ext   · RLE Strength: grossly 4/5  · LLE ROM: WFL except lacking ~30 deg from terminal knee ext  · LLE Strength: WFL    Functional Mobility:  · Bed Mobility:     · Scooting: modified independence  · Supine to Sit: modified independence  · Sit to Supine: modified independence  · Transfers:     · Sit to Stand:  contact guard assistance with no AD  · Gait: ~75 ft with no AD and CGA - ambulates with flexed trunk and B knees with decreased step length and foot clearance      Therapeutic Activities and Exercises:  Pt reporting nausea at start of session which increased with sitting and ambulated in halls as reported above then requesting to return to bed 2/2 nausea.    AM-PAC 6 CLICK MOBILITY  Total Score:20     Patient left HOB elevated with all lines intact, call button in reach, bed alarm on.    GOALS:   Multidisciplinary Problems     Physical Therapy Goals        Problem: Physical Therapy Goal    Goal Priority Disciplines Outcome Goal Variances Interventions   Physical Therapy Goal     PT, PT/OT Ongoing, Progressing     Description:  Goals to be met by: 4/8/20      Patient will increase functional independence with mobility by performin. Sit to stand transfer with Supervision  2. Bed to chair transfer with Supervision   3. Gait  x 150 feet with Supervision   4. Lower extremity exercise program x 10 reps per handout, with supervision                      History:     Past Medical History:   Diagnosis Date    Hypertension     Stage IV adenocarcinoma of pancreas     Stroke 3/6/2020    Subclinical hypothyroidism 3/7/2020       Past Surgical History:   Procedure Laterality Date    APPENDECTOMY      duodenal stent      ENDOSCOPIC ULTRASOUND OF UPPER GASTROINTESTINAL TRACT N/A 2020    Procedure: ULTRASOUND, UPPER GI TRACT, ENDOSCOPIC;  Surgeon: Toni Messer MD;  Location: [address]);  Service: Endoscopy;  Laterality: N/A;    ESOPHAGOGASTRODUODENOSCOPY N/A 2020    Procedure: EGD (ESOPHAGOGASTRODUODENOSCOPY);  Surgeon: Cory Servin MD;  Location: [address]);  Service: Endoscopy;  Laterality: N/A;       Time Tracking:     PT Received On: 20  PT Start Time: 910     PT Stop Time: 926  PT Total Time (min): 16 min     Billable Minutes: Evaluation 16      Elaine Camarillo, PT  2020

## 2020-03-08 NOTE — PLAN OF CARE
Problem: Adult Inpatient Plan of Care  Goal: Absence of Hospital-Acquired Illness or Injury  Outcome: Ongoing, Progressing  Intervention: Identify and Manage Fall Risk  Flowsheets (Taken 3/8/2020 0350)  Safety Promotion/Fall Prevention: Fall Risk reviewed with patient/family; side rails raised x 2; room near unit station; nonskid shoes/socks when out of bed; bed alarm set  Intervention: Prevent VTE (venous thromboembolism)  Flowsheets (Taken 3/8/2020 0350)  VTE Prevention/Management: ambulation promoted            Patient ambulates to restroom with stand by assist.    Problem: Fatigue (Oncology Care)  Goal: Improved Activity Tolerance  Outcome: Ongoing, Progressing  Intervention: Promote Energy Conservation  Flowsheets (Taken 3/8/2020 0350)  Fatigue Management: activity assistance provided  Sleep/Rest Enhancement: regular sleep/rest pattern promoted  Activity Management: activity clustered for rest period   Patient had one episode of vomiting and nausea relieved with ordered antiemetics. VSS and no other complaints.

## 2020-03-08 NOTE — PT/OT/SLP EVAL
Occupational Therapy   Evaluation    Name: Radha Alcazar  MRN: [id number]  Admitting Diagnosis:  TIA (transient ischemic attack)      Recommendations:     Discharge Recommendations: home health PT, home health OT  Discharge Equipment Recommendations:  (tBD)  Barriers to discharge:  None    Assessment:     Radha Alcazar is a 64 y.o. female with a medical diagnosis of TIA (transient ischemic attack).  She presents with deconditioning . Performance deficits affecting function: weakness, impaired self care skills, impaired balance, impaired functional mobilty, impaired endurance, gait instability, impaired joint extensibility, edema.      Pt would benefit from cont OT services in order to maximize functional independence. Recommending HHOT/PT at d/c.     Rehab Prognosis: Good; patient would benefit from acute skilled OT services to address these deficits and reach maximum level of function.       Plan:     Patient to be seen 5 x/week to address the above listed problems via self-care/home management, therapeutic activities, therapeutic exercises  · Plan of Care Expires: 04/08/20  · Plan of Care Reviewed with: patient    Subjective     Chief Complaint: nausea; increased with movement and mobility   Patient/Family Comments/goals: none stated     Occupational Profile:  Living Environment: with brother and sister in law in [facility], 4 steps to enter, L rail, t/s combo with grab bar   Previous level of function: independent/mod I; BSC frame over toilet; sower chair for bathing   Equipment Used at Home:  shower chair, bedside commode, grab bar  Assistance upon Discharge: from brother and/or NAVYA    Pain/Comfort:  · Pain Rating 1: 0/10    Patients cultural, spiritual, Jehovah's witness conflicts given the current situation:      Objective:     Communicated with: kae prior to session.    General Precautions: Standard, fall   Orthopedic Precautions:N/A   Braces: N/A     Occupational Performance:    Bed Mobility:    · Patient  completed Scooting/Bridging with modified independence  · Patient completed Supine to Sit with modified independence  · Patient completed Sit to Supine with modified independence    Functional Mobility/Transfers:  · Patient completed Sit <> Stand Transfer with contact guard assistance  with  no assistive device   · Functional Mobility: CGA with AD; forward flexed posture and flexed B LEs     Activities of Daily Living:  Simulated LBD with figure 4 for socks     Cognitive/Visual Perceptual:  Cognitive/Psychosocial Skills:     -       Oriented to: Person, Place, Time and Situation   -       Follows Commands/attention:Follows multistep  commands  -       Communication: clear/fluent  -       Memory: No Deficits noted  -       Safety awareness/insight to disability: intact   -       Mood/Affect/Coping skills/emotional control: Flat affect    Physical Exam:  Balance:    -       WFL sitting balance; CGA standing   Postural examination/scapula alignment:    -       Rounded shoulders  -       Forward head  -       Abnormal trunk flexion  -       Kyphosis  Edema LUE   Sensation:    -       Intact  Dominant hand:    -       right  Upper Extremity Range of Motion:   BUE WFL   Upper Extremity Strength:  Grossly 4-/5 BUE throughout    Strength:  Good   Fine Motor Coordination:    -       Intact    AMPAC 6 Click ADL:  AMPAC Total Score: 18    Treatment & Education:  Pt performing functional mobility in hallway   Axs limited by nausea at this time   Education:    Patient left supine with all lines intact, call button in reach, bed alarm on and nsg notified    GOALS:   Multidisciplinary Problems     Occupational Therapy Goals        Problem: Occupational Therapy Goal    Goal Priority Disciplines Outcome Interventions   Occupational Therapy Goal     OT, PT/OT Ongoing, Progressing    Description:  Goals to be met by: 4/8/20     Patient will increase functional independence with ADLs by performing:    LE Dressing with Modified  [place].  Grooming while standing with Modified [place].  Toileting from toilet with Modified [place] for hygiene and clothing management.   Supine to sit with Modified [place].  Step transfer with Modified [place]  Toilet transfer to toilet with Modified [place].  Increased functional strength to WFL for self care skills and functional mobility.  Upper extremity exercise program x10 reps per handout, with independence.                      History:     Past Medical History:   Diagnosis Date    Hypertension     Stage IV adenocarcinoma of pancreas     Stroke 3/6/2020    Subclinical hypothyroidism 3/7/2020       Past Surgical History:   Procedure Laterality Date    APPENDECTOMY      duodenal stent      ENDOSCOPIC ULTRASOUND OF UPPER GASTROINTESTINAL TRACT N/A 1/20/2020    Procedure: ULTRASOUND, UPPER GI TRACT, ENDOSCOPIC;  Surgeon: Toni Messer MD;  Location: [facility] ([address] [place] [address]);  Service: Endoscopy;  Laterality: N/A;    ESOPHAGOGASTRODUODENOSCOPY N/A 1/22/2020    Procedure: EGD (ESOPHAGOGASTRODUODENOSCOPY);  Surgeon: Cory Servin MD;  Location: [facility] ([address] [place] [address]);  Service: Endoscopy;  Laterality: N/A;       Time Tracking:     OT Date of Treatment: 03/08/20  OT Start Time: 0910  OT Stop Time: 0926  OT Total Time (min): 16 min    Billable Minutes:Evaluation 16    Allie Chavez OT  3/8/2020

## 2020-03-09 PROBLEM — I63.412 STROKE DUE TO EMBOLISM OF LEFT MIDDLE CEREBRAL ARTERY: Status: ACTIVE | Noted: 2020-03-06

## 2020-03-09 NOTE — SUBJECTIVE & OBJECTIVE
"  Woke up with symptoms?: no    Recent bleeding noted: no  Does the patient take any Blood Thinners? no  Medications: No relevant medications      Past Medical History: hypertension and Cancer    Past Surgical History: no major surgeries within the last 2 weeks    Family History: no relevant history    Social History: no smoking, no drinking, no drugs    Allergies: No Known Allergies     Review of Systems   Neurological: Positive for facial asymmetry, speech difficulty and weakness.   All other systems reviewed and are negative.    Objective:   Vitals: Blood pressure 130/69, pulse 91, temperature 97.5 °F (36.4 °C), resp. rate 17, height 5' 11" (1.803 m), weight 68.8 kg (151 lb 10.8 oz), SpO2 96 %, not currently breastfeeding.     CT READ: Yes  No hemmorhage. No mass effect. No early infarct signs.     Physical Exam   Constitutional: She is oriented to person, place, and time. She appears well-developed and well-nourished.   HENT:   Head: Normocephalic and atraumatic.   Eyes: Pupils are equal, round, and reactive to light. EOM are normal.   Cardiovascular: Normal rate and regular rhythm.   Pulmonary/Chest: Effort normal.   Neurological: She is alert and oriented to person, place, and time. A cranial nerve deficit is present.   Vitals reviewed.        "

## 2020-03-09 NOTE — CONSULTS
[facility]  Vascular Neurology  Comprehensive Stroke Center  Tele-Consultation Note      Consults    Consulting Provider: JOHNNA BEST  Current Providers  No providers found    Patient Location:  [facility] TELEMETRY UNIT Emergency Department  Spoke hospital nurse at bedside with patient assisting consultant.     Patient information was obtained from relative(s).         Assessment/Plan:     STROKE DOCUMENTATION     Acute Stroke Times:   Acute Stroke Times   Last Known Normal Date: 03/06/20  Last Known Normal Time: 1645  Symptom Onset Date: 03/06/20  Symptom Onset Time: 1645  Stroke Team Called Date: 03/06/20  Stroke Team Called Time: 1656  Stroke Team Arrival Date: 03/06/20  Stroke Team Arrival Time: 1700  CT Interpretation Time: 1700    NIH Scale:        Modified Osborne Score: 3  [place] Coma Scale:    ABCD2 Score:    [id number] Score:   HAS -BLED Score:   ICH Score:   Hunt & Beverly Classification:       Diagnoses:   Stroke due to embolism of left middle cerebral artery  Embolic L MCA stroke  Antithrombotics for secondary stroke prevention: Antiplatelets: Aspirin: 81 mg daily  Clopidogrel: 75 mg daily    Statins for secondary stroke prevention and hyperlipidemia, if present:   Statins: Atorvastatin- 40 mg daily    Aggressive risk factor modification: HTN     Rehab efforts: The patient has been evaluated by a stroke team provider and the therapy needs have been fully considered based off the presenting complaints and exam findings. The following therapy evaluations are needed: PT evaluate and treat, OT evaluate and treat    Diagnostics ordered/pending: Lipid Profile to assess cholesterol levels, MRA head to assess vasculature, MRA neck/arch to assess vasculature, MRI head without contrast to assess brain parenchyma, TTE to assess cardiac function/status     VTE prophylaxis: None: Reason for No Pharmacological VTE Prophylaxis: Confirmed malignancy disease that increases  "bleeding risk    BP parameters: Infarct: No intervention, SBP <220            Blood pressure 130/69, pulse 91, temperature 97.5 °F (36.4 °C), resp. rate 17, height 5' 11" (1.803 m), weight 68.8 kg (151 lb 10.8 oz), SpO2 96 %, not currently breastfeeding.  Alteplase Eligible?: No  Alteplase Recommendation: Alteplase not recommended due to Other   Possible Interventional Revascularization Candidate? No; No large vessel occlusion    Disposition Recommendation: admit to inpatient  transfer to system [facility] by  ground  standard    Subjective:     History of Present Illness:  This is a 64-year-old female who was last known normal at 16 45 at which time she developed right upper extremity weakness and right facial droop.  The patient has a history of stage IV pancreatic cancer and is under treatment for that.      Woke up with symptoms?: no    Recent bleeding noted: no  Does the patient take any Blood Thinners? no  Medications: No relevant medications      Past Medical History: hypertension and Cancer    Past Surgical History: no major surgeries within the last 2 weeks    Family History: no relevant history    Social History: no smoking, no drinking, no drugs    Allergies: No Known Allergies     Review of Systems   Neurological: Positive for facial asymmetry, speech difficulty and weakness.   All other systems reviewed and are negative.    Objective:   Vitals: Blood pressure 130/69, pulse 91, temperature 97.5 °F (36.4 °C), resp. rate 17, height 5' 11" (1.803 m), weight 68.8 kg (151 lb 10.8 oz), SpO2 96 %, not currently breastfeeding.     CT READ: Yes  No hemmorhage. No mass effect. No early infarct signs.     Physical Exam   Constitutional: She is oriented to person, place, and time. She appears well-developed and well-nourished.   HENT:   Head: Normocephalic and atraumatic.   Eyes: Pupils are equal, round, and reactive to light. EOM are normal.   Cardiovascular: Normal rate and regular rhythm.   Pulmonary/Chest: " Effort normal.   Neurological: She is alert and oriented to person, place, and time. A cranial nerve deficit is present.   Vitals reviewed.            Recommended the emergency room physician to have a brief discussion with the patient and/or family if available regarding the risks and benefits of treatment, and to briefly document the occurrence of that discussion in his clinical encounter note.     The attending portion of this evaluation, treatment, and documentation was performed per Gema Soto MD via audiovisual.    Billing code:  (non-intervention mild to moderate stroke, TIA, some mimics)    · This patient has a critical neurological condition/illness, with some potential for high morbidity and mortality.  · There is a moderate probability for acute neurological change leading to clinical and possibly life-threatening deterioration requiring highest level of physician preparedness for urgent intervention.  · Care was coordinated with other physicians involved in the patient's care.  · Radiologic studies and laboratory data were reviewed and interpreted, and plan of care was re-assessed based on the results.  · Diagnosis, treatment options and prognosis may have been discussed with the patient and/or family members or caregiver.      In your opinion, this was a: Tier 1 Van Negative    Consult End Time: 1044     Gema Soto MD  Comprehensive Stroke Center  Vascular Neurology   [facility]

## 2020-03-09 NOTE — SUBJECTIVE & OBJECTIVE
Oncology Treatment Plan:   [No treatment plan]    Medications:  Continuous Infusions:  Scheduled Meds:   aspirin  81 mg Oral Daily    enoxaparin  40 mg Subcutaneous Daily    hydroCHLOROthiazide  12.5 mg Oral Daily    lisinopril  20 mg Oral Daily    mirtazapine  15 mg Oral Nightly     PRN Meds:acetaminophen, calcium carbonate, famotidine, HYDROcodone-acetaminophen, ondansetron, promethazine (PHENERGAN) IVPB     Review of patient's allergies indicates:  No Known Allergies     Past Medical History:   Diagnosis Date    Hypertension     Stage IV adenocarcinoma of pancreas     Stroke 3/6/2020    Subclinical hypothyroidism 3/7/2020     Past Surgical History:   Procedure Laterality Date    APPENDECTOMY      duodenal stent      ENDOSCOPIC ULTRASOUND OF UPPER GASTROINTESTINAL TRACT N/A 1/20/2020    Procedure: ULTRASOUND, UPPER GI TRACT, ENDOSCOPIC;  Surgeon: Toni Messer MD;  [facility]  Service: Endoscopy;  Laterality: N/A;    ESOPHAGOGASTRODUODENOSCOPY N/A 1/22/2020    Procedure: EGD (ESOPHAGOGASTRODUODENOSCOPY);  Surgeon: Cory Servin MD;  [facility]  Service: Endoscopy;  Laterality: N/A;     Family History     Problem Relation (Age of Onset)    Breast cancer Mother    Colon cancer Mother    Depression Daughter    Pancreatic cancer Maternal Uncle    Prostate cancer Brother        Tobacco Use    Smoking status: Never Smoker    Smokeless tobacco: Never Used   Substance and Sexual Activity    Alcohol use: Never     Frequency: Never    Drug use: Never    Sexual activity: Not Currently       Review of Systems   Constitutional: Negative for chills, fatigue and fever.   HENT: Negative for sore throat and trouble swallowing.    Eyes: Negative for photophobia, pain and visual disturbance.   Respiratory: Positive for shortness of breath. Negative for chest tightness.    Cardiovascular: Negative for chest pain, palpitations and leg swelling.   Gastrointestinal: Positive  for abdominal pain, constipation and nausea. Negative for diarrhea and vomiting.   Genitourinary: Negative for difficulty urinating and dysuria.   Musculoskeletal: Negative for arthralgias and back pain.   Skin: Negative for color change and rash.   Neurological: Negative for weakness, numbness and headaches.     Objective:     Vital Signs (Most Recent):  Temp: 97.5 °F (36.4 °C) (03/08/20 1652)  Pulse: 91 (03/08/20 1652)  Resp: 17 (03/08/20 1652)  BP: 130/69 (03/08/20 1652)  SpO2: 96 % (03/08/20 1652) Vital Signs (24h Range):  Temp:  [97 °F (36.1 °C)-98.8 °F (37.1 °C)] 97.5 °F (36.4 °C)  Pulse:  [] 91  Resp:  [17-20] 17  SpO2:  [94 %-97 %] 96 %  BP: (127-132)/(66-72) 130/69     Weight: 68.8 kg (151 lb 10.8 oz)  Body mass index is 21.15 kg/m².  Body surface area is 1.86 meters squared.      Intake/Output Summary (Last 24 hours) at 3/8/2020 2011  Last data filed at 3/8/2020 1218  Gross per 24 hour   Intake 200 ml   Output 1100 ml   Net -900 ml       Physical Exam   Constitutional: She is oriented to person, place, and time. She appears well-developed and well-nourished. No distress.   HENT:   Head: Normocephalic and atraumatic.   Eyes: EOM are normal. Right eye exhibits no discharge. Left eye exhibits no discharge. No scleral icterus.   Cardiovascular: Normal rate, regular rhythm, normal heart sounds and intact distal pulses. Exam reveals no gallop and no friction rub.   No murmur heard.  Pulmonary/Chest: Effort normal and breath sounds normal. No respiratory distress. She has no wheezes. She has no rales. She exhibits no tenderness.   Abdominal: Soft. Bowel sounds are normal. She exhibits no distension and no mass. There is no tenderness. There is no rebound and no guarding.   Musculoskeletal: Normal range of motion. She exhibits no edema or tenderness.   Neurological: She is alert and oriented to person, place, and time.   Skin: No rash noted. She is not diaphoretic. No erythema.   Psychiatric: She has a  normal mood and affect. Her behavior is normal.       Significant Labs:   Recent Results (from the past 24 hour(s))   Comprehensive metabolic panel    Collection Time: 03/08/20  5:08 AM   Result Value Ref Range    Sodium 134 (L) 136 - 145 mmol/L    Potassium 3.0 (L) 3.5 - 5.1 mmol/L    Chloride 94 (L) 95 - 110 mmol/L    CO2 26 23 - 29 mmol/L    Glucose 116 (H) 70 - 110 mg/dL    BUN, Bld 14 8 - 23 mg/dL    Creatinine 0.7 0.5 - 1.4 mg/dL    Calcium 8.1 (L) 8.7 - 10.5 mg/dL    Total Protein 5.8 (L) 6.0 - 8.4 g/dL    Albumin 2.0 (L) 3.5 - 5.2 g/dL    Total Bilirubin 0.9 0.1 - 1.0 mg/dL    Alkaline Phosphatase 328 (H) 55 - 135 U/L    AST 39 10 - 40 U/L    ALT 21 10 - 44 U/L    Anion Gap 14 8 - 16 mmol/L    eGFR if African American >60 >60 mL/min/1.73 m^2    eGFR if non African American >60 >60 mL/min/1.73 m^2   CBC auto differential    Collection Time: 03/08/20  5:08 AM   Result Value Ref Range    WBC 18.83 (H) 3.90 - 12.70 K/uL    RBC 3.39 (L) 4.00 - 5.40 M/uL    Hemoglobin 10.5 (L) 12.0 - 16.0 g/dL    Hematocrit 31.6 (L) 37.0 - 48.5 %    Mean Corpuscular Volume 93 82 - 98 fL    Mean Corpuscular Hemoglobin 31.0 27.0 - 31.0 pg    Mean Corpuscular Hemoglobin Conc 33.2 32.0 - 36.0 g/dL    RDW 16.7 (H) 11.5 - 14.5 %    Platelets 417 (H) 150 - 350 K/uL    MPV 9.7 9.2 - 12.9 fL    Immature Granulocytes 0.8 (H) 0.0 - 0.5 %    Gran # (ANC) 16.1 (H) 1.8 - 7.7 K/uL    Immature Grans (Abs) 0.16 (H) 0.00 - 0.04 K/uL    Lymph # 1.0 1.0 - 4.8 K/uL    Mono # 1.4 (H) 0.3 - 1.0 K/uL    Eos # 0.1 0.0 - 0.5 K/uL    Baso # 0.04 0.00 - 0.20 K/uL    nRBC 0 0 /100 WBC    Gran% 85.7 (H) 38.0 - 73.0 %    Lymph% 5.5 (L) 18.0 - 48.0 %    Mono% 7.5 4.0 - 15.0 %    Eosinophil% 0.3 0.0 - 8.0 %    Basophil% 0.2 0.0 - 1.9 %    Differential Method Automated    Magnesium    Collection Time: 03/08/20  5:08 AM   Result Value Ref Range    Magnesium 1.6 1.6 - 2.6 mg/dL         Diagnostic Results:  I have reviewed all pertinent imaging results/findings  within the past 24 hours.

## 2020-03-09 NOTE — ASSESSMENT & PLAN NOTE
Embolic L MCA stroke  Antithrombotics for secondary stroke prevention: Antiplatelets: Aspirin: 81 mg daily  Clopidogrel: 75 mg daily    Statins for secondary stroke prevention and hyperlipidemia, if present:   Statins: Atorvastatin- 40 mg daily    Aggressive risk factor modification: HTN     Rehab efforts: The patient has been evaluated by a stroke team provider and the therapy needs have been fully considered based off the presenting complaints and exam findings. The following therapy evaluations are needed: PT evaluate and treat, OT evaluate and treat    Diagnostics ordered/pending: Lipid Profile to assess cholesterol levels, MRA head to assess vasculature, MRA neck/arch to assess vasculature, MRI head without contrast to assess brain parenchyma, TTE to assess cardiac function/status     VTE prophylaxis: None: Reason for No Pharmacological VTE Prophylaxis: Confirmed malignancy disease that increases bleeding risk    BP parameters: Infarct: No intervention, SBP <220

## 2020-03-09 NOTE — HPI
This is a 64-year-old female who was last known normal at 16 45 at which time she developed right upper extremity weakness and right facial droop.  The patient has a history of stage IV pancreatic cancer and is under treatment for that.

## 2020-03-09 NOTE — CONSULTS
[facility]  Hematology/Oncology  Consult Note    Patient Name: Radha Alcazar  MRN: [id number]  Admission Date: 3/6/2020  Hospital Length of Stay: 2 days  Code Status: Full Code   Attending Provider: No att. providers found  Consulting Provider: Ray Pederson MD  Primary Care Physician: Hossein Mortensen MD  Principal Problem:Thromboembolic stroke    Inpatient consult to Hematology/Oncology  Consult performed by: Ray Pederson MD  Consult ordered by: Myles Christian DO  Reason for consult: pancreatic cancer        Subjective:     HPI:  Pt is a 65 yo F with HTN, recently diagnosed metastatic pancreatic cancer under the care of Dr Gutiérrez presented to [facility] with complaint of acute onset of right-sided upper extremity weakness.  US of the carotid on 3/07/20 showed no clear evidence of stenosis.  MRI of the brain and MRA of the brain and neck on 3/07/20 showed multiple tiny areas of diffusion restriction involving the left frontoparietal lobes as well as a punctate area of restricted diffusion in the right parietal lobe most compatible with tiny infarcts. No significant vascular abnormalities were identified.  Neurocritical care was consulted and recommended ASA and Heme/Onc consult given concern for hypercoagulable state given her malignancy. Echo done on 3/07/20 showed normal left ventricular systolic function. The estimated ejection fraction is 55%, left ventricular diastolic dysfunction, and negative bubble study. Currently the patient complains of feeling bloated, nausea, and constipation.  She endorses SOB but denies cough, fever, chills.      Oncology Treatment Plan:   [No treatment plan]    Medications:  Continuous Infusions:  Scheduled Meds:   aspirin  81 mg Oral Daily    enoxaparin  40 mg Subcutaneous Daily    hydroCHLOROthiazide  12.5 mg Oral Daily    lisinopril  20 mg Oral Daily    mirtazapine  15 mg Oral Nightly     PRN Meds:acetaminophen, calcium carbonate,  famotidine, HYDROcodone-acetaminophen, ondansetron, promethazine (PHENERGAN) IVPB     Review of patient's allergies indicates:  No Known Allergies     Past Medical History:   Diagnosis Date    Hypertension     Stage IV adenocarcinoma of pancreas     Stroke 3/6/2020    Subclinical hypothyroidism 3/7/2020     Past Surgical History:   Procedure Laterality Date    APPENDECTOMY      duodenal stent      ENDOSCOPIC ULTRASOUND OF UPPER GASTROINTESTINAL TRACT N/A 1/20/2020    Procedure: ULTRASOUND, UPPER GI TRACT, ENDOSCOPIC;  Surgeon: Toni Messer MD;  Location: [facility] ([address] [place] [address]);  Service: Endoscopy;  Laterality: N/A;    ESOPHAGOGASTRODUODENOSCOPY N/A 1/22/2020    Procedure: EGD (ESOPHAGOGASTRODUODENOSCOPY);  Surgeon: Cory Servin MD;  [facility]  Service: Endoscopy;  Laterality: N/A;     Family History     Problem Relation (Age of Onset)    Breast cancer Mother    Colon cancer Mother    Depression Daughter    Pancreatic cancer Maternal Uncle    Prostate cancer Brother        Tobacco Use    Smoking status: Never Smoker    Smokeless tobacco: Never Used   Substance and Sexual Activity    Alcohol use: Never     Frequency: Never    Drug use: Never    Sexual activity: Not Currently       Review of Systems   Constitutional: Negative for chills, fatigue and fever.   HENT: Negative for sore throat and trouble swallowing.    Eyes: Negative for photophobia, pain and visual disturbance.   Respiratory: Positive for shortness of breath. Negative for chest tightness.    Cardiovascular: Negative for chest pain, palpitations and leg swelling.   Gastrointestinal: Positive for abdominal pain, constipation and nausea. Negative for diarrhea and vomiting.   Genitourinary: Negative for difficulty urinating and dysuria.   Musculoskeletal: Negative for arthralgias and back pain.   Skin: Negative for color change and rash.   Neurological: Negative for weakness, numbness and headaches.     Objective:     Vital  Signs (Most Recent):  Temp: 97.5 °F (36.4 °C) (03/08/20 1652)  Pulse: 91 (03/08/20 1652)  Resp: 17 (03/08/20 1652)  BP: 130/69 (03/08/20 1652)  SpO2: 96 % (03/08/20 1652) Vital Signs (24h Range):  Temp:  [97 °F (36.1 °C)-98.8 °F (37.1 °C)] 97.5 °F (36.4 °C)  Pulse:  [] 91  Resp:  [17-20] 17  SpO2:  [94 %-97 %] 96 %  BP: (127-132)/(66-72) 130/69     Weight: 68.8 kg (151 lb 10.8 oz)  Body mass index is 21.15 kg/m².  Body surface area is 1.86 meters squared.      Intake/Output Summary (Last 24 hours) at 3/8/2020 2011  Last data filed at 3/8/2020 1218  Gross per 24 hour   Intake 200 ml   Output 1100 ml   Net -900 ml       Physical Exam   Constitutional: She is oriented to person, place, and time. She appears well-developed and well-nourished. No distress.   HENT:   Head: Normocephalic and atraumatic.   Eyes: EOM are normal. Right eye exhibits no discharge. Left eye exhibits no discharge. No scleral icterus.   Cardiovascular: Normal rate, regular rhythm, normal heart sounds and intact distal pulses. Exam reveals no gallop and no friction rub.   No murmur heard.  Pulmonary/Chest: Effort normal and breath sounds normal. No respiratory distress. She has no wheezes. She has no rales. She exhibits no tenderness.   Abdominal: Soft. Bowel sounds are normal. She exhibits no distension and no mass. There is no tenderness. There is no rebound and no guarding.   Musculoskeletal: Normal range of motion. She exhibits no edema or tenderness.   Neurological: She is alert and oriented to person, place, and time.   Skin: No rash noted. She is not diaphoretic. No erythema.   Psychiatric: She has a normal mood and affect. Her behavior is normal.       Significant Labs:   Recent Results (from the past 24 hour(s))   Comprehensive metabolic panel    Collection Time: 03/08/20  5:08 AM   Result Value Ref Range    Sodium 134 (L) 136 - 145 mmol/L    Potassium 3.0 (L) 3.5 - 5.1 mmol/L    Chloride 94 (L) 95 - 110 mmol/L    CO2 26 23 - 29  mmol/L    Glucose 116 (H) 70 - 110 mg/dL    BUN, Bld 14 8 - 23 mg/dL    Creatinine 0.7 0.5 - 1.4 mg/dL    Calcium 8.1 (L) 8.7 - 10.5 mg/dL    Total Protein 5.8 (L) 6.0 - 8.4 g/dL    Albumin 2.0 (L) 3.5 - 5.2 g/dL    Total Bilirubin 0.9 0.1 - 1.0 mg/dL    Alkaline Phosphatase 328 (H) 55 - 135 U/L    AST 39 10 - 40 U/L    ALT 21 10 - 44 U/L    Anion Gap 14 8 - 16 mmol/L    eGFR if African American >60 >60 mL/min/1.73 m^2    eGFR if non African American >60 >60 mL/min/1.73 m^2   CBC auto differential    Collection Time: 03/08/20  5:08 AM   Result Value Ref Range    WBC 18.83 (H) 3.90 - 12.70 K/uL    RBC 3.39 (L) 4.00 - 5.40 M/uL    Hemoglobin 10.5 (L) 12.0 - 16.0 g/dL    Hematocrit 31.6 (L) 37.0 - 48.5 %    Mean Corpuscular Volume 93 82 - 98 fL    Mean Corpuscular Hemoglobin 31.0 27.0 - 31.0 pg    Mean Corpuscular Hemoglobin Conc 33.2 32.0 - 36.0 g/dL    RDW 16.7 (H) 11.5 - 14.5 %    Platelets 417 (H) 150 - 350 K/uL    MPV 9.7 9.2 - 12.9 fL    Immature Granulocytes 0.8 (H) 0.0 - 0.5 %    Gran # (ANC) 16.1 (H) 1.8 - 7.7 K/uL    Immature Grans (Abs) 0.16 (H) 0.00 - 0.04 K/uL    Lymph # 1.0 1.0 - 4.8 K/uL    Mono # 1.4 (H) 0.3 - 1.0 K/uL    Eos # 0.1 0.0 - 0.5 K/uL    Baso # 0.04 0.00 - 0.20 K/uL    nRBC 0 0 /100 WBC    Gran% 85.7 (H) 38.0 - 73.0 %    Lymph% 5.5 (L) 18.0 - 48.0 %    Mono% 7.5 4.0 - 15.0 %    Eosinophil% 0.3 0.0 - 8.0 %    Basophil% 0.2 0.0 - 1.9 %    Differential Method Automated    Magnesium    Collection Time: 03/08/20  5:08 AM   Result Value Ref Range    Magnesium 1.6 1.6 - 2.6 mg/dL         Diagnostic Results:  I have reviewed all pertinent imaging results/findings within the past 24 hours.    Assessment/Plan:     * Thromboembolic stroke  -The patient was recagnosed with thromboembolic stroke on MRI of the brain  -Concern is that the patient developed her stroke as a consequence of hypercoagulability due to her underlying malignancy  -Anticoagulation was discussed with the patient.  -It was discussed  that the anticoagulation would help to possibly prevent another stroke but may predispose her to future bleeding given that she wishes not to treat her underlying pancreatic cancer  -The patient decided she did not want to take blood thinners and risk future bleeding  -PT is to proceed with hospice for her metastatic pancreatic cancer.    Stage IV adenocarcinoma of pancreas  -The patient has metastatic pancreatic cancer for which the patient does not want to receive any chemotherapy  -Hospice was discussed with the patient and she wanted to move forward with having hospice care established.  -This was discussed with he primary team.  -Pt to go home with home hospice.        Thank you for your consult. I will sign off. Please contact us if you have any additional questions.    Ray Pederson MD  Hematology/Oncology  [facility]

## 2020-03-11 ENCOUNTER — PATIENT OUTREACH (OUTPATIENT)
Dept: ADMINISTRATIVE | Facility: CLINIC | Age: 65
End: 2020-03-11

## 2021-01-06 NOTE — ASSESSMENT & PLAN NOTE
-The patient has metastatic pancreatic cancer for which the patient does not want to receive any chemotherapy  -Hospice was discussed with the patient and she wanted to move forward with having hospice care established.  -This was discussed with he primary team.  -Pt to go home with home hospice.   Spine appears normal, range of motion is not limited, no muscle or joint tenderness

## 2021-05-06 ENCOUNTER — PATIENT MESSAGE (OUTPATIENT)
Dept: RESEARCH | Facility: HOSPITAL | Age: 66
End: 2021-05-06

## 2022-07-27 NOTE — HPI
65 yo F with PMHx of HTN and appendectomy who presented with complaints of nausea and vomiting, inability to tolerate PO and generalized weakness, transferred from [facility] for evaluation of pancreatic mass.    Patient was hospitalized 1/10/2020 - 1/16/2020 at [facility] with acute onset nausea and vomiting, and dx with a mass on her pancreas. Discharged with outpatient follow-up, but returned to ED twice with intractable nausea/vomiting yesterday.  Reports 40 lb weight loss in the last 5 months with early satiety.  No jaundice, abdominal fullness, overt bleeding. Denies smoking and alcohol.    WBC 19 -> 15. Tbili 1.1, , AST 70s, ALT 90s. CA 19-9 >21K. INR 1.5.  U/S with CBD 0.58mm, distended fluid-filled stomach, suspected liver mass.  CT with distended stomach and duodenum, ascites, 1cm R lobe liver mass, lower omentum attenuation. Abnormal diminished attenuation in the region of the body and tail of the pancreas with fluid adjacent to these areas.  Findings could represent pancreatic neoplasm and/or pancreatitis.   MOLECULAR PCR

## 2025-05-21 NOTE — PLAN OF CARE
SW spoke with pt and family about medical equipment and provided community resources. Pt reported she is comfortable going home with family. Family reported that pt will have a bed and comfortable space provided to rest.     Patience Breaux LMSW  [facility]  [id number]     done